# Patient Record
Sex: FEMALE | Race: WHITE | NOT HISPANIC OR LATINO | Employment: OTHER | URBAN - METROPOLITAN AREA
[De-identification: names, ages, dates, MRNs, and addresses within clinical notes are randomized per-mention and may not be internally consistent; named-entity substitution may affect disease eponyms.]

---

## 2017-05-17 ENCOUNTER — ONCOLOGY VISIT (OUTPATIENT)
Dept: ONCOLOGY | Facility: CLINIC | Age: 42
End: 2017-05-17
Attending: CLINICAL NURSE SPECIALIST
Payer: COMMERCIAL

## 2017-05-17 VITALS
WEIGHT: 194.2 LBS | HEIGHT: 69 IN | HEART RATE: 88 BPM | SYSTOLIC BLOOD PRESSURE: 123 MMHG | TEMPERATURE: 98.8 F | OXYGEN SATURATION: 96 % | DIASTOLIC BLOOD PRESSURE: 72 MMHG | RESPIRATION RATE: 16 BRPM | BODY MASS INDEX: 28.76 KG/M2

## 2017-05-17 DIAGNOSIS — Z80.3 FAMILY HISTORY OF MALIGNANT NEOPLASM OF BREAST: ICD-10-CM

## 2017-05-17 DIAGNOSIS — Z91.89 AT HIGH RISK FOR BREAST CANCER: Primary | ICD-10-CM

## 2017-05-17 DIAGNOSIS — F41.9 ANXIETY: ICD-10-CM

## 2017-05-17 PROCEDURE — 99213 OFFICE O/P EST LOW 20 MIN: CPT | Performed by: CLINICAL NURSE SPECIALIST

## 2017-05-17 PROCEDURE — 99211 OFF/OP EST MAY X REQ PHY/QHP: CPT

## 2017-05-17 RX ORDER — LORAZEPAM 1 MG/1
0.5-1 TABLET ORAL EVERY 8 HOURS PRN
Qty: 2 TABLET | Refills: 0 | Status: SHIPPED | OUTPATIENT
Start: 2017-05-17 | End: 2017-09-24

## 2017-05-17 RX ORDER — GABAPENTIN 100 MG/1
600 CAPSULE ORAL AT BEDTIME
COMMUNITY
Start: 2017-04-24 | End: 2017-11-15 | Stop reason: DRUGHIGH

## 2017-05-17 ASSESSMENT — PAIN SCALES - GENERAL: PAINLEVEL: NO PAIN (0)

## 2017-05-17 NOTE — PATIENT INSTRUCTIONS
Individualized Surveillance Plan for women  With 20% or greater lifetime risk of breast cancer   Per NCCN Guidelines Version 1.2016   Recommended screening Test or procedure Last done Next Scheduled    Clinical encounter Ongoing risk assessment, risk reduction counseling and clinical breast exam, every 6-12 months.   5/17/2017 Nov. 2017   However, some family histories with breast cancers at a very young age, may warrant screening starting earlier.    *May begin at age 40 if breast cancers in the family occur at later ages.    Annual mammogram beginning 10 years younger than the earliest breast cancer in the family but not prior to age 30.    Recommend annual breast MRI to begin 10 years younger than the earliest breast cancer in the family but not prior to age 25.    Breast MRIs are preferably done on day 7-15 of the menstrual cycle in premenopausal women.   5/31/2016 - Breast MRI, BiRads1      11/7/2016 - Screening tomosynthesis mammogram, BiRads1 NEXT: Breast MRI in June.    Next mammogram: Nov. After exam (no earlier than 11/8)   Breast screening for patients at high risk due to thoracic radiation between the ages of 10-30     Begin 8-10 years post radiation treatment or age 25.   Annual mammogram   and  Annual breast MRI, preferably on day 7-15 of menstrual cycle for premenopausal women.    Annual clinical breast exams with ongoing risk assessment and risk reduction counseling until age 25, then every 6-12 months.   NA   JAKOB     Maritza will call us when she knows her cycle for the MRI.  She is scheduled with Elena Aranda on 11/15/17 and then the mammo to follow that same day.  Maritza also asked for a prescription to take prior to the MRI.  I talked with Elena Aranda and she said that she will fax prescription to [facility] in [place]. Azeb FRANKLIN  AVS given to patient

## 2017-05-17 NOTE — LETTER
Cancer Risk Management  Program Locations    [facility]  [facility]  [facility]  [facility]  [facility]  Mailing Address  Cancer Risk Management Program  [facility]  [address]  [place] [address]    New patient appointments  [phone]  May 17, 2017    Maritza Hanson  [address] [address]      Dear Maritza,    It was a pleasure seeing you doing so well today.  Below is a copy of my note from our visit, outlining your surveillance plan.      I look forward to seeing you in the future to coordinate your care and reduce your health risks. Please feel free to contact me if you have any questions or concerns.      Oncology Risk Management Consultation:  Date on this visit: 2017    Maritza Hanson returns to the clinic today for a six month follow up. She requires high risk screening and surveillance for her risk of cancer secondary to having a family history of breast cancer in her mother at age 52,  maternal aunt in her 40s and paternal grandmother in her 40s.      She is considered to at high risk for breast cancer and has a 28% lifetime risk for breast cancer by the Evaristo model.     Primary Physician: Ollie Loera PA-C      History Of Present Illness:  Ms. Hanson is a very pleasant , healthy 41 year old female who presents with family history of breast cancer.   Genetic Testin2015 - Neeraj panel through [facility], negative for genetic mutations in 25 genes including: APC, MATTHEW, BARD1, BMPR1A, BRCA1, BRCA2, BRIP1, CDH1, CDK4, CDKN2A, CHEK2, EPCAM (large rearrangement only), MLH1, MSH2, MSH6, MUTYH, NBN, PALB2, PMS2, PTEN, RAD51C, RAD51D, SMAD4, STK11, and TP53 genes. No mutations were found in any of the 25 genes analyzed. This test involved sequencing and deletion/duplication analysis of all  genes with the exception of EPCAM (deletions only). However, she has a 28% lifetime risk for breast cancer by the Evaristo model. (See below for other family history of cancer).  Pertinent history:  Menarche at age 14.  First child at age 34.   Premenopausal.  Ovaries and uterus intact.  LMP: unknown.    History of breastfeeding both of her sons, for 6 months and 10 months, respectively.   History of OCPs 10 years and used one dose of depo-provera; she was unable to tolerate the medication, as it heightened her anxiety.  No history of HRT     Breast imaging history:  Breast MRI and mammogram in  from [place]. No records available; she states they were normal.  2015 -Screening mammogram, BiRads 0 for asymmetry in R breast  2015 - Diagnostic mammogram, R breast - BiRads1, fibroglandular asymmetry in upper R breast  2016 - Breast MRI, BiRads1  2016 - Screening tomosynthesis mammogram, BiRads1.    Other cancer screenin2016 - Colonoscopy for family history of colon cancer in father at age 65, paternal cousin with colon cancer in 50s. And paternal uncle with 10+ colon polyps. Results: 7 hyperplastic plastic polyps removed (2 in ascending and 5 in sigmoid), and 1 tubular adenoma removed from descending colon.      At this visit, she denies fatigue, pain, asymmetry, lumps, masses, thickening, pain, nipple discharge and skin changes in her breasts.     Past Medical/Surgical History:  Past Medical History:   Diagnosis Date     Anemia during pregnancy and now     Back pain      Depression with anxiety since teenage     History of tics      Mild preeclampsia     during both pregnancy, resolved     POTS (postural orthostatic tachycardia syndrome)      Past Surgical History:   Procedure Laterality Date     ENT SURGERY      wisdom teeth extraction     ORTHOPEDIC SURGERY      removal i=os sesimoid bone right foot       Allergies:  Allergies as of 2017 - Pedro as Reviewed 2017    Allergen Reaction Noted     Hydrocodone Itching 2012     Sulfa drugs Hives 2012       Current Medications:  Current Outpatient Prescriptions   Medication Sig Dispense Refill     Cetirizine HCl (ZYRTEC ALLERGY PO) Take 10 mg by mouth 2 times daily       MONTELUKAST SODIUM PO Take 10 mg by mouth At Bedtime       beclomethasone (QVAR) 80 MCG/ACT Inhaler Inhale 2 puffs into the lungs 2 times daily       gabapentin (NEURONTIN) 100 MG capsule 400 mg          Family History:  Family History   Problem Relation Age of Onset     Breast Cancer Mother 52      at 52     Thyroid Disease Mother      Psychotic Disorder Mother      Obesity Mother      Lung Cancer Maternal Grandmother       at 70     CEREBROVASCULAR DISEASE Maternal Grandfather      DIABETES Maternal Grandfather      Breast Cancer Paternal Grandmother 40     recurrence in 70s/recurrence in 70s,  at 70     DIABETES Father      Obesity Father      Psychotic Disorder Father      Hearing Loss Father      Colon Cancer Father 65     Breast Cancer Maternal Aunt 40      in 70s     Psychotic Disorder Sister      Colon Polyps Paternal Uncle      more than 10 colon polyps     Colon Cancer Cousin 50     maternal cousin     CANCER Paternal Aunt      unknown,  in 70s after reaction to chemo     CANCER Maternal Aunt 51     brain or bone cancer       Social History:  Social History     Social History     Marital status:      Spouse name: Sukumar     Number of children: 2     Years of education: N/A     Occupational History     Not on file.     Social History Main Topics     Smoking status: Never Smoker     Smokeless tobacco: Never Used     Alcohol use 0.0 oz/week     0 Standard drinks or equivalent per week      Comment: very rarely     Drug use: No     Sexual activity: Yes     Partners: Male     Birth control/ protection: Condom     Other Topics Concern     Parent/Sibling W/ Cabg, Mi Or Angioplasty Before 65f 55m? No      Service No  "    Blood Transfusions No     Caffeine Concern No     Occupational Exposure No     Hobby Hazards No     Sleep Concern No     Stress Concern Yes     Weight Concern Yes     would like to lose weight     Special Diet Yes     elimination dairy     Back Care Yes     Exercise No     Bike Helmet Not Asked     NA, don't ride     Seat Belt Yes     Self-Exams No     Social History Narrative       Physical Exam:  /72  Pulse 88  Temp 98.8  F (37.1  C)  Resp 16  Ht 1.753 m (5' 9.02\")  Wt 88.1 kg (194 lb 3.2 oz)  SpO2 96%  BMI 28.66 kg/m2    GENERAL APPEARANCE: healthy, alert and no distress     LYMPHATICS: No cervical, supraclavicular, axillary or inguinal lymphadenopathy     RESP: lungs clear to auscultation - no rales, rhonchi or wheezes  CARDIOVASCULAR: regular rate and rhythm, normal S1 S2, no S3 or S4 and no murmur.   BREAST: A multi positional, bilateral breast exam was performed.  Fairly symmetrical -Rsl>L. Right breast: no palpable dominant masses, no nipple discharge, no skin changes. Dense tissue.  Right axilla: no palpable adenopathy. Left breast: no palpable dominant masses, no nipple discharge, no skin changes. Left axilla: no palpable adenopathy. Dense tissue.        SKIN: no suspicious lesions or rashes      Family History   Problem Relation Age of Onset     Breast Cancer Mother 52      at 52     Thyroid Disease Mother      Psychotic Disorder Mother      Obesity Mother      Lung Cancer Maternal Grandmother       at 70     CEREBROVASCULAR DISEASE Maternal Grandfather      DIABETES Maternal Grandfather      Breast Cancer Paternal Grandmother 40     recurrence in 70s/recurrence in 70s,  at 70     DIABETES Father      Obesity Father      Psychotic Disorder Father      Hearing Loss Father      Colon Cancer Father 65     Breast Cancer Maternal Aunt 40      in 70s     Psychotic Disorder Sister      Colon Polyps Paternal Uncle      more than 10 colon polyps     Colon Cancer Cousin 50     " maternal cousin     CANCER Paternal Aunt      unknown,  in 70s after reaction to chemo     CANCER Maternal Aunt 51     brain or bone cancer       Laboratory/Imaging Studies  Results for orders placed or performed during the hospital encounter of 16   MA Screen Bilateral w/Baron    Narrative    SCREENING MAMMOGRAM, BILATERAL, DIGITAL w/CAD AND TOMOSYNTHESIS -  2016 11:08 AM    BREAST SYMPTOMS: No current breast complaints.     COMPARISON:  2015, 2011.    BREAST DENSITY: Scattered fibroglandular densities.    COMMENTS: No findings of suspicion for malignancy.       Impression    IMPRESSION: BI-RADS CATEGORY: 1 -  Negative    RECOMMENDED FOLLOW-UP: Annual Mammography.    Exam results letter mailed to patient.      ACOSTA PANTOJA MD       ASSESSMENT  We discussed her last screening tests and reviewed results.  She does not have any new breast complaints at this time.  She is doing well with her health promotion practices, runs using the [employer] program 3-4 days/week and has lost 8 pounds since November.  Her BMI is 28.64, fully clothed.  She continues to not smoke and limits alcohol.  Her next screening will be for a breast MRI in ; I am ordering ativan for her to help her with her anxiety during screening, per her request.    INDIVIDUALIZED CANCER RISK MANAGEMENT PLAN:  Individualized Surveillance Plan for women  With 20% or greater lifetime risk of breast cancer   Per NCCN Guidelines Version 1.2016   Recommended screening Test or procedure Last done Next Scheduled    Clinical encounter Ongoing risk assessment, risk reduction counseling and clinical breast exam, every 6-12 months.   2017   However, some family histories with breast cancers at a very young age, may warrant screening starting earlier.    *May begin at age 40 if breast cancers in the family occur at later ages.    Annual mammogram beginning 10 years younger than the earliest breast cancer in the family but  not prior to age 30.    Recommend annual breast MRI to begin 10 years younger than the earliest breast cancer in the family but not prior to age 25.    Breast MRIs are preferably done on day 7-15 of the menstrual cycle in premenopausal women.   5/31/2016 - Breast MRI, BiRads1      11/7/2016 - Screening tomosynthesis mammogram, BiRads1 NEXT: Breast MRI in June.    Next mammogram: Nov. After exam (no earlier than 11/8)   Breast screening for patients at high risk due to thoracic radiation between the ages of 10-30     Begin 8-10 years post radiation treatment or age 25.       I will follow up on her screenings and see her in the Cancer Risk Management clinic in November.    I spent 22 minutes with the patient with greater that 50% of it in counseling and coordinating care as documented above.    JENNIFER Guerrero-CNS, OCN, ANG-BC  Clinical Nurse Specialist  Cancer Risk Management Program  [address]  [place] [address]    phone:  [phone]  Pager: [phone]  fax: [phone]    Cc: Ollie Loera PA-C

## 2017-05-17 NOTE — MR AVS SNAPSHOT
After Visit Summary   5/17/2017    Maritza Hanson    MRN: [id number]           Patient Information     Date Of Birth          1975        Visit Information        Provider Department      5/17/2017 9:30 AM Elena Aranda APRN [facility] Cancer Risk Management Program        Today's Diagnoses     At high risk for breast cancer    -  1    Family history of malignant neoplasm of breast        Anxiety          Care Instructions    Individualized Surveillance Plan for women  With 20% or greater lifetime risk of breast cancer   Per NCCN Guidelines Version 1.2016   Recommended screening Test or procedure Last done Next Scheduled    Clinical encounter Ongoing risk assessment, risk reduction counseling and clinical breast exam, every 6-12 months.   5/17/2017 Nov. 2017   However, some family histories with breast cancers at a very young age, may warrant screening starting earlier.    *May begin at age 40 if breast cancers in the family occur at later ages.    Annual mammogram beginning 10 years younger than the earliest breast cancer in the family but not prior to age 30.    Recommend annual breast MRI to begin 10 years younger than the earliest breast cancer in the family but not prior to age 25.    Breast MRIs are preferably done on day 7-15 of the menstrual cycle in premenopausal women.   5/31/2016 - Breast MRI, BiRads1      11/7/2016 - Screening tomosynthesis mammogram, BiRads1 NEXT: Breast MRI in June.    Next mammogram: Nov. After exam (no earlier than 11/8)   Breast screening for patients at high risk due to thoracic radiation between the ages of 10-30     Begin 8-10 years post radiation treatment or age 25.   Annual mammogram   and  Annual breast MRI, preferably on day 7-15 of menstrual cycle for premenopausal women.    Annual clinical breast exams with ongoing risk assessment and risk reduction counseling until age 25, then every 6-12 months.   JAKOB Salas will call us when she  knows her cycle for the MRI.  She is scheduled with Elena Aranda on 11/15/17 and then the mammo to follow that same day.  Maritza also asked for a prescription to take prior to the MRI.  I talked with Elena Manoj and she said that she will fax prescription to [facility] in [place]. Azeb NORTONS given to patient          Follow-ups after your visit        Follow-up notes from your care team     Return in about 6 months (around 11/17/2017) for Physical Exam.      Your next 10 appointments already scheduled     Nov 15, 2017 10:00 AM CST   Return Visit with JENNIFER Guerrero CNS   Cancer Risk Management Program ([facility])    [facility]  [address]  [facility] [id number] [phone]            Nov 15, 2017 11:20 AM CST   MA SCREENING BILATERAL W/ BARON with RHBCMA1   [facility] ([facility])    [address] [facility]  [facility] [id number] [phone]           Do not use any powder, lotion or deodorant under your arms or on your breast. If you do, we will ask you to remove it before your exam.  Wear comfortable, two-piece clothing.  If you have any allergies, tell your care team.  Bring any previous mammograms from other facilities or have them mailed to the breast center.              Future tests that were ordered for you today     Open Future Orders        Priority Expected Expires Ordered    MA Screen Bilateral w/Baron Routine 11/8/2017 5/18/2018 5/17/2017            Who to contact     If you have questions or need follow up information about today's clinic visit or your schedule please contact CANCER RISK MANAGEMENT PROGRAM directly at [phone].  Normal or non-critical lab and imaging results will be communicated to you by MyChart, letter or phone within 4 business days after the clinic has received the results. If you do not hear from us within 7 days, please contact the clinic through  "MyChart or phone. If you have a critical or abnormal lab result, we will notify you by phone as soon as possible.  Submit refill requests through [facility] or call your pharmacy and they will forward the refill request to us. Please allow 3 business days for your refill to be completed.          Additional Information About Your Visit        [employer] Information     [facility] gives you secure access to your electronic health record. If you see a primary care provider, you can also send messages to your care team and make appointments. If you have questions, please call your primary care clinic.  If you do not have a primary care provider, please call [phone] and they will assist you.        Care EveryWhere ID     This is your Care EveryWhere ID. This could be used by other organizations to access your [place] medical records  [id number]        Your Vitals Were     Pulse Temperature Respirations Height Pulse Oximetry BMI (Body Mass Index)    88 98.8  F (37.1  C) 16 1.753 m (5' 9.02\") 96% 28.66 kg/m2       Blood Pressure from Last 3 Encounters:   05/17/17 123/72   11/16/16 113/72   05/18/16 105/69    Weight from Last 3 Encounters:   05/17/17 88.1 kg (194 lb 3.2 oz)   11/16/16 90 kg (198 lb 6.4 oz)   05/18/16 87.2 kg (192 lb 3.2 oz)                 Today's Medication Changes          These changes are accurate as of: 5/17/17 10:25 AM.  If you have any questions, ask your nurse or doctor.               Start taking these medicines.        Dose/Directions    LORazepam 1 MG tablet   Commonly known as:  ATIVAN   Used for:  Anxiety   Started by:  Elena Aranda APRN CNS        Dose:  0.5-1 mg   Take 0.5-1 tablets (0.5-1 mg) by mouth every 8 hours as needed for anxiety or other (prior to breast MRI) Take 30 minutes prior to departure.  Do not operate a vehicle after taking this medication   Quantity:  2 tablet   Refills:  0            Where to get your medicines      Some of these will need a paper prescription " and others can be bought over the counter.  Ask your nurse if you have questions.     Bring a paper prescription for each of these medications     LORazepam 1 MG tablet                Primary Care Provider Office Phone # Fax #    Ollie Loera PA-C [phone]       [facility] [address] [address] [facility] [address]        Thank you!     Thank you for choosing CANCER RISK MANAGEMENT PROGRAM  for your care. Our goal is always to provide you with excellent care. Hearing back from our patients is one way we can continue to improve our services. Please take a few minutes to complete the written survey that you may receive in the mail after your visit with us. Thank you!             Your Updated Medication List - Protect others around you: Learn how to safely use, store and throw away your medicines at www.disposemymeds.org.          This list is accurate as of: 5/17/17 10:25 AM.  Always use your most recent med list.                   Brand Name Dispense Instructions for use    gabapentin 100 MG capsule    NEURONTIN     400 mg       LORazepam 1 MG tablet    ATIVAN    2 tablet    Take 0.5-1 tablets (0.5-1 mg) by mouth every 8 hours as needed for anxiety or other (prior to breast MRI) Take 30 minutes prior to departure.  Do not operate a vehicle after taking this medication       MONTELUKAST SODIUM PO      Take 10 mg by mouth At Bedtime       QVAR 80 MCG/ACT Inhaler   Generic drug:  beclomethasone      Inhale 2 puffs into the lungs 2 times daily       ZYRTEC ALLERGY PO      Take 10 mg by mouth 2 times daily

## 2017-05-17 NOTE — NURSING NOTE
"Oncology Rooming Note    May 17, 2017 9:33 AM   Maritza Hanson is a 41 year old female who presents for:    Chief Complaint   Patient presents with     Family History Of Cancer     Initial Vitals: /72  Pulse 88  Temp 98.8  F (37.1  C)  Resp 16  Ht 1.753 m (5' 9.02\")  Wt 88.1 kg (194 lb 3.2 oz)  SpO2 96%  BMI 28.66 kg/m2 Estimated body mass index is 28.66 kg/(m^2) as calculated from the following:    Height as of this encounter: 1.753 m (5' 9.02\").    Weight as of this encounter: 88.1 kg (194 lb 3.2 oz). Body surface area is 2.07 meters squared.  No Pain (0) Comment: Data Unavailable   No LMP recorded.  Allergies reviewed: Yes  Medications reviewed: Yes    Medications: Medication refills not needed today.  Pharmacy name entered into [employer]: [facility] [address] [place] [address]  [address]    Clinical concerns: Follow-up     8 minutes for nursing intake (face to face time)     Blanca Ulrich    DISCHARGE PLAN:  Next appointments: See patient instruction section  Departure Mode: Ambulatory  Accompanied by: self  0 minutes for nursing discharge (face to face time)   Blanca Ulrich              "

## 2017-05-17 NOTE — PROGRESS NOTES
Oncology Risk Management Consultation:  Date on this visit: 2017    Maritza Hanson returns to the clinic today for a six month follow up. She requires high risk screening and surveillance for her risk of cancer secondary to having a family history of breast cancer in her mother at age 52,  maternal aunt in her 40s and paternal grandmother in her 40s.      She is considered to at high risk for breast cancer and has a 28% lifetime risk for breast cancer by the Evaristo model.     Primary Physician: Ollie Loera PA-C      History Of Present Illness:  Ms. Hanson is a very pleasant , healthy 41 year old female who presents with family history of breast cancer.   Genetic Testin2015 - [employer] panel through [employer], negative for genetic mutations in 25 genes including: APC, MATTHEW, BARD1, BMPR1A, BRCA1, BRCA2, BRIP1, CDH1, CDK4, CDKN2A, CHEK2, EPCAM (large rearrangement only), MLH1, MSH2, MSH6, MUTYH, NBN, PALB2, PMS2, PTEN, RAD51C, RAD51D, SMAD4, STK11, and TP53 genes. No mutations were found in any of the 25 genes analyzed. This test involved sequencing and deletion/duplication analysis of all genes with the exception of EPCAM (deletions only). However, she has a 28% lifetime risk for breast cancer by the Evaristo model. (See below for other family history of cancer).  Pertinent history:  Menarche at age 14.  First child at age 34.   Premenopausal.  Ovaries and uterus intact.  LMP: unknown.    History of breastfeeding both of her sons, for 6 months and 10 months, respectively.   History of OCPs 10 years and used one dose of depo-provera; she was unable to tolerate the medication, as it heightened her anxiety.  No history of HRT     Breast imaging history:  Breast MRI and mammogram in  from [place]. No records available; she states they were normal.  2015 -Screening mammogram, BiRads 0 for asymmetry in R breast  2015 - Diagnostic mammogram, R breast - BiRads1, fibroglandular asymmetry  in upper R breast  2016 - Breast MRI, BiRads1  2016 - Screening tomosynthesis mammogram, BiRads1.    Other cancer screenin2016 - Colonoscopy for family history of colon cancer in father at age 65, paternal cousin with colon cancer in 50s. And paternal uncle with 10+ colon polyps. Results: 7 hyperplastic plastic polyps removed (2 in ascending and 5 in sigmoid), and 1 tubular adenoma removed from descending colon.      At this visit, she denies fatigue, pain, asymmetry, lumps, masses, thickening, pain, nipple discharge and skin changes in her breasts.     Past Medical/Surgical History:  Past Medical History:   Diagnosis Date     Anemia during pregnancy and now     Back pain      Depression with anxiety since teenage     History of tics      Mild preeclampsia     during both pregnancy, resolved     POTS (postural orthostatic tachycardia syndrome)      Past Surgical History:   Procedure Laterality Date     ENT SURGERY      wisdom teeth extraction     ORTHOPEDIC SURGERY      removal i=os sesimoid bone right foot       Allergies:  Allergies as of 2017 - Pedro as Reviewed 2017   Allergen Reaction Noted     Hydrocodone Itching 2012     Sulfa drugs Hives 2012       Current Medications:  Current Outpatient Prescriptions   Medication Sig Dispense Refill     Cetirizine HCl (ZYRTEC ALLERGY PO) Take 10 mg by mouth 2 times daily       MONTELUKAST SODIUM PO Take 10 mg by mouth At Bedtime       beclomethasone (QVAR) 80 MCG/ACT Inhaler Inhale 2 puffs into the lungs 2 times daily       gabapentin (NEURONTIN) 100 MG capsule 400 mg          Family History:  Family History   Problem Relation Age of Onset     Breast Cancer Mother 52      at 52     Thyroid Disease Mother      Psychotic Disorder Mother      Obesity Mother      Lung Cancer Maternal Grandmother       at 70     CEREBROVASCULAR DISEASE Maternal Grandfather      DIABETES Maternal Grandfather      Breast Cancer Paternal  "Grandmother 40     recurrence in 70s/recurrence in 70s,  at 70     DIABETES Father      Obesity Father      Psychotic Disorder Father      Hearing Loss Father      Colon Cancer Father 65     Breast Cancer Maternal Aunt 40      in 70s     Psychotic Disorder Sister      Colon Polyps Paternal Uncle      more than 10 colon polyps     Colon Cancer Cousin 50     maternal cousin     CANCER Paternal Aunt      unknown,  in 70s after reaction to chemo     CANCER Maternal Aunt 51     brain or bone cancer       Social History:  Social History     Social History     Marital status:      Spouse name: Sukumar     Number of children: 2     Years of education: N/A     Occupational History     Not on file.     Social History Main Topics     Smoking status: Never Smoker     Smokeless tobacco: Never Used     Alcohol use 0.0 oz/week     0 Standard drinks or equivalent per week      Comment: very rarely     Drug use: No     Sexual activity: Yes     Partners: Male     Birth control/ protection: Condom     Other Topics Concern     Parent/Sibling W/ Cabg, Mi Or Angioplasty Before 65f 55m? No      Service No     Blood Transfusions No     Caffeine Concern No     Occupational Exposure No     Hobby Hazards No     Sleep Concern No     Stress Concern Yes     Weight Concern Yes     would like to lose weight     Special Diet Yes     elimination dairy     Back Care Yes     Exercise No     Bike Helmet Not Asked     NA, don't ride     Seat Belt Yes     Self-Exams No     Social History Narrative       Physical Exam:  /72  Pulse 88  Temp 98.8  F (37.1  C)  Resp 16  Ht 1.753 m (5' 9.02\")  Wt 88.1 kg (194 lb 3.2 oz)  SpO2 96%  BMI 28.66 kg/m2    GENERAL APPEARANCE: healthy, alert and no distress     LYMPHATICS: No cervical, supraclavicular, axillary or inguinal lymphadenopathy     RESP: lungs clear to auscultation - no rales, rhonchi or wheezes  CARDIOVASCULAR: regular rate and rhythm, normal S1 S2, no S3 or S4 and no " murmur.   BREAST: A multi positional, bilateral breast exam was performed.  Fairly symmetrical -Rsl>L. Right breast: no palpable dominant masses, no nipple discharge, no skin changes. Dense tissue.  Right axilla: no palpable adenopathy. Left breast: no palpable dominant masses, no nipple discharge, no skin changes. Left axilla: no palpable adenopathy. Dense tissue.        SKIN: no suspicious lesions or rashes      Family History   Problem Relation Age of Onset     Breast Cancer Mother 52      at 52     Thyroid Disease Mother      Psychotic Disorder Mother      Obesity Mother      Lung Cancer Maternal Grandmother       at 70     CEREBROVASCULAR DISEASE Maternal Grandfather      DIABETES Maternal Grandfather      Breast Cancer Paternal Grandmother 40     recurrence in 70s/recurrence in 70s,  at 70     DIABETES Father      Obesity Father      Psychotic Disorder Father      Hearing Loss Father      Colon Cancer Father 65     Breast Cancer Maternal Aunt 40      in 70s     Psychotic Disorder Sister      Colon Polyps Paternal Uncle      more than 10 colon polyps     Colon Cancer Cousin 50     maternal cousin     CANCER Paternal Aunt      unknown,  in 70s after reaction to chemo     CANCER Maternal Aunt 51     brain or bone cancer       Laboratory/Imaging Studies  Results for orders placed or performed during the hospital encounter of 16   MA Screen Bilateral w/Baron    Narrative    SCREENING MAMMOGRAM, BILATERAL, DIGITAL w/CAD AND TOMOSYNTHESIS -  2016 11:08 AM    BREAST SYMPTOMS: No current breast complaints.     COMPARISON:  2015, 2011.    BREAST DENSITY: Scattered fibroglandular densities.    COMMENTS: No findings of suspicion for malignancy.       Impression    IMPRESSION: BI-RADS CATEGORY: 1 -  Negative    RECOMMENDED FOLLOW-UP: Annual Mammography.    Exam results letter mailed to patient.      ACOSTA PANTOJA MD       ASSESSMENT  We discussed her last screening tests and reviewed  results.  She does not have any new breast complaints at this time.  She is doing well with her health promotion practices, runs using the [employer] program 3-4 days/week and has lost 8 pounds since November.  Her BMI is 28.64, fully clothed.  She continues to not smoke and limits alcohol.  Her next screening will be for a breast MRI in June; I am ordering ativan for her to help her with her anxiety during screening, per her request.    INDIVIDUALIZED CANCER RISK MANAGEMENT PLAN:  Individualized Surveillance Plan for women  With 20% or greater lifetime risk of breast cancer   Per NCCN Guidelines Version 1.2016   Recommended screening Test or procedure Last done Next Scheduled    Clinical encounter Ongoing risk assessment, risk reduction counseling and clinical breast exam, every 6-12 months.   5/17/2017 Nov. 2017   However, some family histories with breast cancers at a very young age, may warrant screening starting earlier.    *May begin at age 40 if breast cancers in the family occur at later ages.    Annual mammogram beginning 10 years younger than the earliest breast cancer in the family but not prior to age 30.    Recommend annual breast MRI to begin 10 years younger than the earliest breast cancer in the family but not prior to age 25.    Breast MRIs are preferably done on day 7-15 of the menstrual cycle in premenopausal women.   5/31/2016 - Breast MRI, BiRads1      11/7/2016 - Screening tomosynthesis mammogram, BiRads1 NEXT: Breast MRI in June.    Next mammogram: Nov. After exam (no earlier than 11/8)   Breast screening for patients at high risk due to thoracic radiation between the ages of 10-30     Begin 8-10 years post radiation treatment or age 25.       I will follow up on her screenings and see her in the Cancer Risk Management clinic in November.    I spent 22 minutes with the patient with greater that 50% of it in counseling and coordinating care as documented above.    Elena Aranda,  APRN-CNS, OCN, ANG-BC  Clinical Nurse Specialist  Cancer Risk Management Program  [address]  [place] [address]    phone:  [phone]  Pager: [phone]  fax: [phone]    Cc: Ollie Loera PA-C

## 2017-08-15 ENCOUNTER — TRANSFERRED RECORDS (OUTPATIENT)
Dept: HEALTH INFORMATION MANAGEMENT | Facility: CLINIC | Age: 42
End: 2017-08-15

## 2017-09-24 ENCOUNTER — APPOINTMENT (OUTPATIENT)
Dept: GENERAL RADIOLOGY | Facility: CLINIC | Age: 42
End: 2017-09-24
Attending: EMERGENCY MEDICINE
Payer: COMMERCIAL

## 2017-09-24 ENCOUNTER — HOSPITAL ENCOUNTER (EMERGENCY)
Facility: CLINIC | Age: 42
Discharge: HOME | End: 2017-09-24
Attending: EMERGENCY MEDICINE | Admitting: EMERGENCY MEDICINE
Payer: COMMERCIAL

## 2017-09-24 VITALS
TEMPERATURE: 97.5 F | RESPIRATION RATE: 18 BRPM | HEART RATE: 87 BPM | OXYGEN SATURATION: 99 % | DIASTOLIC BLOOD PRESSURE: 75 MMHG | SYSTOLIC BLOOD PRESSURE: 128 MMHG

## 2017-09-24 DIAGNOSIS — J45.901 ASTHMA EXACERBATION: ICD-10-CM

## 2017-09-24 LAB
ANION GAP SERPL CALCULATED.3IONS-SCNC: 9 MMOL/L (ref 3–14)
BUN SERPL-MCNC: 17 MG/DL (ref 7–30)
CALCIUM SERPL-MCNC: 8.7 MG/DL (ref 8.5–10.1)
CHLORIDE SERPL-SCNC: 104 MMOL/L (ref 94–109)
CO2 SERPL-SCNC: 26 MMOL/L (ref 20–32)
CREAT SERPL-MCNC: 0.66 MG/DL (ref 0.52–1.04)
ERYTHROCYTE [DISTWIDTH] IN BLOOD BY AUTOMATED COUNT: 12.1 % (ref 10–15)
GFR SERPL CREATININE-BSD FRML MDRD: >90 ML/MIN/1.7M2
GLUCOSE SERPL-MCNC: 96 MG/DL (ref 70–99)
HCG SERPL QL: NEGATIVE
HCT VFR BLD AUTO: 36.5 % (ref 35–47)
HGB BLD-MCNC: 12.7 G/DL (ref 11.7–15.7)
MCH RBC QN AUTO: 31.4 PG (ref 26.5–33)
MCHC RBC AUTO-ENTMCNC: 34.8 G/DL (ref 31.5–36.5)
MCV RBC AUTO: 90 FL (ref 78–100)
PLATELET # BLD AUTO: 209 10E9/L (ref 150–450)
POTASSIUM SERPL-SCNC: 3 MMOL/L (ref 3.4–5.3)
RBC # BLD AUTO: 4.04 10E12/L (ref 3.8–5.2)
SODIUM SERPL-SCNC: 139 MMOL/L (ref 133–144)
TROPONIN I SERPL-MCNC: <0.015 UG/L (ref 0–0.04)
WBC # BLD AUTO: 8 10E9/L (ref 4–11)

## 2017-09-24 PROCEDURE — 96374 THER/PROPH/DIAG INJ IV PUSH: CPT

## 2017-09-24 PROCEDURE — 80048 BASIC METABOLIC PNL TOTAL CA: CPT | Performed by: EMERGENCY MEDICINE

## 2017-09-24 PROCEDURE — 84484 ASSAY OF TROPONIN QUANT: CPT | Performed by: EMERGENCY MEDICINE

## 2017-09-24 PROCEDURE — 94640 AIRWAY INHALATION TREATMENT: CPT

## 2017-09-24 PROCEDURE — 40000275 ZZH STATISTIC RCP TIME EA 10 MIN

## 2017-09-24 PROCEDURE — 96361 HYDRATE IV INFUSION ADD-ON: CPT

## 2017-09-24 PROCEDURE — 25000128 H RX IP 250 OP 636: Performed by: EMERGENCY MEDICINE

## 2017-09-24 PROCEDURE — 85027 COMPLETE CBC AUTOMATED: CPT | Performed by: EMERGENCY MEDICINE

## 2017-09-24 PROCEDURE — 71020 XR CHEST 2 VW: CPT

## 2017-09-24 PROCEDURE — 84703 CHORIONIC GONADOTROPIN ASSAY: CPT | Performed by: EMERGENCY MEDICINE

## 2017-09-24 PROCEDURE — 93005 ELECTROCARDIOGRAM TRACING: CPT

## 2017-09-24 PROCEDURE — 25000125 ZZHC RX 250: Performed by: EMERGENCY MEDICINE

## 2017-09-24 PROCEDURE — 99284 EMERGENCY DEPT VISIT MOD MDM: CPT | Mod: 25

## 2017-09-24 RX ORDER — DEXAMETHASONE SODIUM PHOSPHATE 10 MG/ML
10 INJECTION, SOLUTION INTRAMUSCULAR; INTRAVENOUS ONCE
Status: COMPLETED | OUTPATIENT
Start: 2017-09-24 | End: 2017-09-24

## 2017-09-24 RX ORDER — IPRATROPIUM BROMIDE AND ALBUTEROL SULFATE 2.5; .5 MG/3ML; MG/3ML
3 SOLUTION RESPIRATORY (INHALATION) ONCE
Status: COMPLETED | OUTPATIENT
Start: 2017-09-24 | End: 2017-09-24

## 2017-09-24 RX ORDER — DEXAMETHASONE 4 MG/1
12 TABLET ORAL ONCE
Qty: 3 TABLET | Refills: 0 | Status: SHIPPED | OUTPATIENT
Start: 2017-09-25 | End: 2017-11-21

## 2017-09-24 RX ADMIN — IPRATROPIUM BROMIDE AND ALBUTEROL SULFATE 3 ML: .5; 3 SOLUTION RESPIRATORY (INHALATION) at 14:27

## 2017-09-24 RX ADMIN — SODIUM CHLORIDE 1000 ML: 9 INJECTION, SOLUTION INTRAVENOUS at 14:47

## 2017-09-24 RX ADMIN — DEXAMETHASONE SODIUM PHOSPHATE 10 MG: 10 INJECTION, SOLUTION INTRAMUSCULAR; INTRAVENOUS at 16:08

## 2017-09-24 RX ADMIN — IPRATROPIUM BROMIDE AND ALBUTEROL SULFATE 3 ML: .5; 3 SOLUTION RESPIRATORY (INHALATION) at 16:08

## 2017-09-24 ASSESSMENT — ENCOUNTER SYMPTOMS
NAUSEA: 0
VOMITING: 0
FEVER: 0
SHORTNESS OF BREATH: 1

## 2017-09-24 NOTE — ED PROVIDER NOTES
"  History     Chief Complaint:  Shortness of Breath       HPI   Maritza Hanson is a 42 year old female who presents with primary complaints of shortness of breath. Patient states 12 days ago she began to develop a \"cold\" which he describes as nasal congestion, rhinorrhea and a nonproductive cough. 6 days prior to arrival she was seen by her primary care physician and was placed on his azithromycin and inhaled steroid. She opted against oral steroids secondary to adverse drug effects from insomnia related to prior prednisone use. She had no improvement of the next 24 hours and was initiated on prednisone the following day. She was written for 50 mg once daily but had left over 20 mg tablets so she has only been taking 20 mg daily. She presents today with increased difficulty breathing this morning. She used her albuterol inhaler without improvement. She states that movement or exertion will make the symptoms worse. This is accompanied by a chest heaviness that has been present over the last 12 days. There have been no pain-free episodes. The pain is constant, non-radiating and at times occurs during episodes of worsening asthma. She denies any history of DVT, PE, unilateral like swine, home offices, estrogen use or malignancy..    Allergies:  Hydrocodone  Sulfa Drugs     Medications:      SERTRALINE HCL PO   PREDNISONE PO   Cetirizine HCl (ZYRTEC ALLERGY PO)   MONTELUKAST SODIUM PO   beclomethasone (QVAR) 80 MCG/ACT Inhaler   gabapentin (NEURONTIN) 100 MG capsule     Past Medical History:    Past Medical History:   Diagnosis Date     Anemia during pregnancy and now     Back pain      Depression with anxiety since teenage     History of tics      Mild preeclampsia      POTS (postural orthostatic tachycardia syndrome)        Past Surgical History:    Past Surgical History:   Procedure Laterality Date     ENT SURGERY  1998    wisdom teeth extraction     ORTHOPEDIC SURGERY  1994    removal i=os sesimoid bone right " foot        Family History:    family history includes Breast Cancer (age of onset: 40) in her maternal aunt and paternal grandmother; Breast Cancer (age of onset: 52) in her mother; CANCER in her paternal aunt; CANCER (age of onset: 51) in her maternal aunt; CEREBROVASCULAR DISEASE in her maternal grandfather; Colon Cancer (age of onset: 50) in her cousin; Colon Cancer (age of onset: 65) in her father; Colon Polyps in her paternal uncle; DIABETES in her father and maternal grandfather; Hearing Loss in her father; Lung Cancer in her maternal grandmother; Obesity in her father and mother; Psychotic Disorder in her father, mother, and sister; Thyroid Disease in her mother.    Social History:   reports that she has never smoked. She has never used smokeless tobacco. She reports that she drinks alcohol. She reports that she does not use illicit drugs.    PCP: Ollie Loera     Review of Systems   Constitutional: Negative for fever.   Respiratory: Positive for shortness of breath.    Cardiovascular: Positive for chest pain.   Gastrointestinal: Negative for nausea and vomiting.   Skin: Negative for rash.   All other systems reviewed and are negative.        Physical Exam   Patient Vitals for the past 24 hrs:   BP Temp Temp src Pulse Heart Rate Resp SpO2   09/24/17 1427 - - - - - - 98 %   09/24/17 1408 (!) 151/91 97.5  F (36.4  C) Oral 87 87 18 98 %        Physical Exam    General:   Pleasant, age appropriate female.      Resting comfortably in the bed.  HEENT:    Oropharynx is moist, without lesions or trismus.     TMs clear  Eyes:    Conjunctiva normal, PERRL  Neck:    Supple, no meningismus.     CV:     Regular rate and rhythm.      No murmurs, rubs or gallops.  .       2+ radial pulses bilateral.       No lower extremity edema.  PULM:    Trace late expiratory wheezing at left lower lobe.       No respiratory distress.      Good air exchange.     Speaks in full sentences.     No rales or rhonci.     No stridor.  ABD:     Soft, non-tender, non-distended.       No rebound, guarding or rigidity.  MSK:     No gross deformity to all four extremities.   LYMPH:   No cervical lymphadenopathy.  NEURO:   Alert; good muscle tone     No atrophy  Skin:    Warm, dry and intact.    Psych:    Mood is good and affect is appropriate.        Emergency Department Course     ECG (14:51:10):  Rate 82 bpm.   QT/QTc 394/460.   P-R-T axes 43.   Normal sinus rhythm  Non-specific T wave abnormality - unchanged since 10/29/13  Interpreted at 1458 by Gavino Gabriel MD.       Imaging:  Chest XR,  PA & LAT   Final Result   IMPRESSION: The lungs are clear. No focal pulmonary opacities. Heart   and mediastinum are unremarkable. No acute cardiopulmonary   abnormalities.      MANDO FRIEDMAN MD           Laboratory:  Results for orders placed or performed during the hospital encounter of 09/24/17 (from the past 24 hour(s))   CBC (platelets, no diff)   Result Value Ref Range    WBC 8.0 4.0 - 11.0 10e9/L    RBC Count 4.04 3.8 - 5.2 10e12/L    Hemoglobin 12.7 11.7 - 15.7 g/dL    Hematocrit 36.5 35.0 - 47.0 %    MCV 90 78 - 100 fl    MCH 31.4 26.5 - 33.0 pg    MCHC 34.8 31.5 - 36.5 g/dL    RDW 12.1 10.0 - 15.0 %    Platelet Count 209 150 - 450 10e9/L   Basic metabolic panel (BMP)   Result Value Ref Range    Sodium 139 133 - 144 mmol/L    Potassium 3.0 (L) 3.4 - 5.3 mmol/L    Chloride 104 94 - 109 mmol/L    Carbon Dioxide 26 20 - 32 mmol/L    Anion Gap 9 3 - 14 mmol/L    Glucose 96 70 - 99 mg/dL    Urea Nitrogen 17 7 - 30 mg/dL    Creatinine 0.66 0.52 - 1.04 mg/dL    GFR Estimate >90 >60 mL/min/1.7m2    GFR Estimate If Black >90 >60 mL/min/1.7m2    Calcium 8.7 8.5 - 10.1 mg/dL   Troponin I   Result Value Ref Range    Troponin I ES <0.015 0.000 - 0.045 ug/L   HCG QUALitative pregnancy (blood)   Result Value Ref Range    HCG Qualitative Serum Negative NEG^Negative   EKG 12 lead   Result Value Ref Range    Interpretation ECG Click View Image link to view waveform and  result    Chest XR,  PA & LAT    Narrative    XR CHEST 2 VW 2017 3:20 PM    HISTORY: Dyspnea.    COMPARISON: None.      Impression    IMPRESSION: The lungs are clear. No focal pulmonary opacities. Heart  and mediastinum are unremarkable. No acute cardiopulmonary  abnormalities.    MANDO FRIEDMAN MD         Interventions:  Duoneb x 1  Duoneb x 1  Decadron 10 mg IV    Emergency Department Course:  Past medical records, nursing notes, and vitals reviewed.  I performed an exam of the patient and obtained history, as documented above.    I rechecked the patient at 1543: Wheezing improved.   Findings and plan explained to the Patient and . Patient was discharged home.    Impression & Plan      Medical Decision Makin-year-old female with history of asthma presents to the ED with difficulty breathing and failed outpatient management with bronchodilators and 20 mg of daily prednisone. On examination she had late expiratory wheezing with signs of mild asthma exacerbation. Given a persistence of symptoms I did have the patient undergo further workup. EKG showed no ischemic changes. Troponin with normal limits as she did have vague chest heaviness which has been present for greater than 48 hours thus ruling out MI. She has no features concerning for PE.  She is PERC negative.  I believe her failure of outpatient management has been due to the low-dose prednisone 20 mg. She opted for Decadron today and tomorrow given her history of G.I. upset with prednisone. Patient follow-up primary care physician in 3 days and return to the ED for any worsening symptoms.    Diagnosis:    ICD-10-CM    1. Asthma exacerbation J45.901         Discharge Medications:  New Prescriptions    DEXAMETHASONE (DECADRON) 4 MG TABLET    Take 3 tablets (12 mg) by mouth once for 1 dose        2017   Gavino Gabriel MD Matthews, Jeremiah R, MD  17 1546

## 2017-09-24 NOTE — ED AVS SNAPSHOT
[facility] Department    [address]    [facility] [address]    Phone:  [phone]    Fax:  [phone]                                       Maritza Hanson   MRN: [id number]    Department:  [facility] Emergency Department   Date of Visit:  9/24/2017           After Visit Summary Signature Page     I have received my discharge instructions, and my questions have been answered. I have discussed any challenges I see with this plan with the nurse or doctor.    ..........................................................................................................................................  Patient/Patient Representative Signature      ..........................................................................................................................................  Patient Representative Print Name and Relationship to Patient    ..................................................               ................................................  Date                                            Time    ..........................................................................................................................................  Reviewed by Signature/Title    ...................................................              ..............................................  Date                                                            Time

## 2017-09-24 NOTE — ED NOTES
Congestion with hst of asthma. Pt has been using albuterol inhaler home, not opening her up as much the last 2 days. Nebs at home, then switched to prednisone on Wednesday.   Pt took a full Z-duane, last dose today.

## 2017-09-24 NOTE — ED AVS SNAPSHOT
[facility] Emergency Department    [address]    [facility] [address]    Phone:  [phone]    Fax:  [phone]                                       Maritza Hanson   MRN: [id number]    Department:  [facility] Emergency Department   Date of Visit:  9/24/2017           Patient Information     Date Of Birth          1975        Your diagnoses for this visit were:     Asthma exacerbation        You were seen by Gavino Gabriel MD.      Follow-up Information     Schedule an appointment as soon as possible for a visit with Ollie Loera PA-C.    Specialty:  Physician Assistant    Contact information:    [facility]  [address] [facility] [address] [phone]          Discharge Instructions       Discharge Instructions  Asthma    Asthma is a condition causing narrowing and inflammation of the airways that can make it hard to breathe.  Asthma can also cause cough, wheezing, noisy breathing and tightness in the chest.  Asthma can be brought on or  triggered  by many things, including dust, mold, pollen, cigarette smoke, exercise, stress and infections (like the common cold).     Generally, every Emergency Department visit should have a follow-up clinic visit with either a primary or a specialty clinic/provider. Please follow-up as instructed by your emergency provider today.    Return to the Emergency Department if:    Your breathing gets worse.    You need to use your inhaler more often than every 4 hours, or cannot get relief from your inhaler.    You are very weak, or feel much more ill.    You develop new symptoms, such as chest pain.    You cough up blood.    You are vomiting (throwing up) enough that you cannot keep fluids or your medicine down.    What can I do to help myself?    Fill any prescriptions the provider gave you and take them right away--especially antibiotics. Be sure to finish the whole antibiotic prescription.    You may be given  a prescription for an inhaler, which can help loosen tight air passages.  Use this as needed, but not more often than directed. Inhalers work much better when used with a spacer.     You may be given a prescription for a steroid to reduce inflammation. Used long-term, these can have some side effects, but for short-term use they are safe. You may notice restlessness or increased appetite (eating more).        You may use non-prescription cough or cold medicines. Cough medicines may help, but do not make the cough go away completely.     Avoid smoke, because this can make you feel worse. If you smoke, this may be a good time to quit! Consider using nicotine lozenges, gum, or patches to reduce cravings.     If you have a fever, Tylenol  (acetaminophen), Motrin  (ibuprofen), or Advil  (ibuprofen), may help bring fever down and may help you feel more comfortable. Be sure to read and follow the package directions, and ask your provider if you have questions.    Be sure to get your flu shot each year.  For certain age groups, the pneumonia shot can help prevent pneumonia.  It is important that you follow up with your regular provider, to be sure that you are improving from this spell (an acute asthma exacerbation), and also to do what you can to keep from having trouble again. Sometimes you need long-term medicines to keep your asthma under control.   If you were given a prescription for medicine here today, be sure to read all of the information (including the package insert) that comes with your prescription.  This will include important information about the medicine, its side effects, and any warnings that you need to know about.  The pharmacist who fills the prescription can provide more information and answer questions you may have about the medicine.  If you have questions or concerns that the pharmacist cannot address, please call or return to the Emergency Department.   Remember that you can always come back to  the Emergency Department if you are not able to see your regular provider in the amount of time listed above, if you get any new symptoms, or if there is anything that worries you.      Future Appointments        Provider Department Dept Phone Center    11/15/2017 10:00 AM [facility] [phone] [facility]    11/15/2017 11:20 AM [facility] MAMMO ROOM 1 [facility] [phone] [facility]      24 Hour Appointment Hotline       To make an appointment at any [place] clinic, call [phone] ([phone]). If you don't have a family doctor or clinic, we will help you find one. [place] clinics are conveniently located to serve the needs of you and your family.             Review of your medicines      START taking        Dose / Directions Last dose taken    dexamethasone 4 MG tablet   Commonly known as:  DECADRON   Dose:  12 mg   Quantity:  3 tablet   Start taking on:  9/25/2017        Take 3 tablets (12 mg) by mouth once for 1 dose   Refills:  0          Our records show that you are taking the medicines listed below. If these are incorrect, please call your family doctor or clinic.        Dose / Directions Last dose taken    gabapentin 100 MG capsule   Commonly known as:  NEURONTIN   Dose:  600 mg        Take 600 mg by mouth At Bedtime   Refills:  0        MONTELUKAST SODIUM PO   Dose:  10 mg        Take 10 mg by mouth At Bedtime   Refills:  0        PREDNISONE PO   Dose:  20 mg        Take 20 mg by mouth daily   Refills:  0        QVAR 80 MCG/ACT Inhaler   Dose:  2 puff   Generic drug:  beclomethasone        Inhale 2 puffs into the lungs 2 times daily   Refills:  0        SERTRALINE HCL PO   Dose:  100 mg        Take 100 mg by mouth daily   Refills:  0        ZYRTEC ALLERGY PO   Dose:  10 mg        Take 10 mg by mouth 2 times daily   Refills:  0                Prescriptions were sent or printed at these locations (1 Prescription)                    Other Prescriptions                Printed at Department/Unit printer (1 of 1)         dexamethasone (DECADRON) 4 MG tablet                Procedures and tests performed during your visit     Basic metabolic panel (BMP)    CBC (platelets, no diff)    Chest XR,  PA & LAT    EKG 12 lead    HCG QUALitative pregnancy (blood)    Troponin I      Orders Needing Specimen Collection     None      Pending Results     Date and Time Order Name Status Description    9/24/2017 1419 EKG 12 lead Preliminary             Pending Culture Results     No orders found from 9/22/2017 to 9/25/2017.            Pending Results Instructions     If you had any lab results that were not finalized at the time of your Discharge, you can call the ED Lab Result RN at [phone]. You will be contacted by this team for any positive Lab results or changes in treatment. The nurses are available 7 days a week from 10A to 6:30P.  You can leave a message 24 hours per day and they will return your call.        Test Results From Your Hospital Stay        9/24/2017  3:09 PM      Component Results     Component Value Ref Range & Units Status    WBC 8.0 4.0 - 11.0 10e9/L Final    RBC Count 4.04 3.8 - 5.2 10e12/L Final    Hemoglobin 12.7 11.7 - 15.7 g/dL Final    Hematocrit 36.5 35.0 - 47.0 % Final    MCV 90 78 - 100 fl Final    MCH 31.4 26.5 - 33.0 pg Final    MCHC 34.8 31.5 - 36.5 g/dL Final    RDW 12.1 10.0 - 15.0 % Final    Platelet Count 209 150 - 450 10e9/L Final         9/24/2017  3:34 PM      Component Results     Component Value Ref Range & Units Status    Sodium 139 133 - 144 mmol/L Final    Potassium 3.0 (L) 3.4 - 5.3 mmol/L Final    Chloride 104 94 - 109 mmol/L Final    Carbon Dioxide 26 20 - 32 mmol/L Final    Anion Gap 9 3 - 14 mmol/L Final    Glucose 96 70 - 99 mg/dL Final    Urea Nitrogen 17 7 - 30 mg/dL Final    Creatinine 0.66 0.52 - 1.04 mg/dL Final    GFR Estimate >90 >60 mL/min/1.7m2 Final    Non  GFR Calc    GFR  Estimate If Black >90 >60 mL/min/1.7m2 Final    African American GFR Calc    Calcium 8.7 8.5 - 10.1 mg/dL Final         9/24/2017  3:34 PM      Component Results     Component Value Ref Range & Units Status    Troponin I ES <0.015 0.000 - 0.045 ug/L Final    The 99th percentile for upper reference range is 0.045 ug/L.  Troponin values   in the range of 0.045 - 0.120 ug/L may be associated with risks of adverse   clinical events.           9/24/2017  3:23 PM      Narrative     XR CHEST 2 VW 9/24/2017 3:20 PM    HISTORY: Dyspnea.    COMPARISON: None.        Impression     IMPRESSION: The lungs are clear. No focal pulmonary opacities. Heart  and mediastinum are unremarkable. No acute cardiopulmonary  abnormalities.    MANDO FRIEDMAN MD         9/24/2017  3:35 PM      Component Results     Component Value Ref Range & Units Status    HCG Qualitative Serum Negative NEG^Negative Final    This test is for screening purposes.  Results should be interpreted along with   the clinical picture.  Confirmation testing is available if warranted by   ordering CLA189, HCG Quantitative Pregnancy.                  Clinical Quality Measure: Blood Pressure Screening     Your blood pressure was checked while you were in the emergency department today. The last reading we obtained was  BP: 118/75 . Please read the guidelines below about what these numbers mean and what you should do about them.  If your systolic blood pressure (the top number) is less than 120 and your diastolic blood pressure (the bottom number) is less than 80, then your blood pressure is normal. There is nothing more that you need to do about it.  If your systolic blood pressure (the top number) is 120-139 or your diastolic blood pressure (the bottom number) is 80-89, your blood pressure may be higher than it should be. You should have your blood pressure rechecked within a year by a primary care provider.  If your systolic blood pressure (the top number) is 140 or  greater or your diastolic blood pressure (the bottom number) is 90 or greater, you may have high blood pressure. High blood pressure is treatable, but if left untreated over time it can put you at risk for heart attack, stroke, or kidney failure. You should have your blood pressure rechecked by a primary care provider within the next 4 weeks.  If your provider in the emergency department today gave you specific instructions to follow-up with your doctor or provider even sooner than that, you should follow that instruction and not wait for up to 4 weeks for your follow-up visit.        Thank you for choosing [facility]       Thank you for choosing [facility] for your care. Our goal is always to provide you with excellent care. Hearing back from our patients is one way we can continue to improve our services. Please take a few minutes to complete the written survey that you may receive in the mail after you visit with us. Thank you!        [facility] Information     [facility] gives you secure access to your electronic health record. If you see a primary care provider, you can also send messages to your care team and make appointments. If you have questions, please call your primary care clinic.  If you do not have a primary care provider, please call [phone] and they will assist you.        Care EveryWhere ID     This is your Care EveryWhere ID. This could be used by other organizations to access your [facility] medical records  [id number]        Equal Access to Services     EBONI JO : Hadii adriel Dang, waaxda luqadaha, qaybta kaalmada gordy, angela blair. So [facility] [phone].    ATENCIÓN: Si habla español, tiene a quintero disposición servicios gratuitos de asistencia lingüística. Llame al [phone].    We comply with applicable federal civil rights laws and [place] laws. We do not discriminate on the basis of race, color, national origin, age, disability sex, sexual  orientation or gender identity.            After Visit Summary       This is your record. Keep this with you and show to your community pharmacist(s) and doctor(s) at your next visit.

## 2017-09-24 NOTE — DISCHARGE INSTRUCTIONS
Discharge Instructions  Asthma    Asthma is a condition causing narrowing and inflammation of the airways that can make it hard to breathe.  Asthma can also cause cough, wheezing, noisy breathing and tightness in the chest.  Asthma can be brought on or  triggered  by many things, including dust, mold, pollen, cigarette smoke, exercise, stress and infections (like the common cold).     Generally, every Emergency Department visit should have a follow-up clinic visit with either a primary or a specialty clinic/provider. Please follow-up as instructed by your emergency provider today.    Return to the Emergency Department if:    Your breathing gets worse.    You need to use your inhaler more often than every 4 hours, or cannot get relief from your inhaler.    You are very weak, or feel much more ill.    You develop new symptoms, such as chest pain.    You cough up blood.    You are vomiting (throwing up) enough that you cannot keep fluids or your medicine down.    What can I do to help myself?    Fill any prescriptions the provider gave you and take them right away--especially antibiotics. Be sure to finish the whole antibiotic prescription.    You may be given a prescription for an inhaler, which can help loosen tight air passages.  Use this as needed, but not more often than directed. Inhalers work much better when used with a spacer.     You may be given a prescription for a steroid to reduce inflammation. Used long-term, these can have some side effects, but for short-term use they are safe. You may notice restlessness or increased appetite (eating more).        You may use non-prescription cough or cold medicines. Cough medicines may help, but do not make the cough go away completely.     Avoid smoke, because this can make you feel worse. If you smoke, this may be a good time to quit! Consider using nicotine lozenges, gum, or patches to reduce cravings.     If you have a fever, Tylenol  (acetaminophen), Motrin   (ibuprofen), or Advil  (ibuprofen), may help bring fever down and may help you feel more comfortable. Be sure to read and follow the package directions, and ask your provider if you have questions.    Be sure to get your flu shot each year.  For certain age groups, the pneumonia shot can help prevent pneumonia.  It is important that you follow up with your regular provider, to be sure that you are improving from this spell (an acute asthma exacerbation), and also to do what you can to keep from having trouble again. Sometimes you need long-term medicines to keep your asthma under control.   If you were given a prescription for medicine here today, be sure to read all of the information (including the package insert) that comes with your prescription.  This will include important information about the medicine, its side effects, and any warnings that you need to know about.  The pharmacist who fills the prescription can provide more information and answer questions you may have about the medicine.  If you have questions or concerns that the pharmacist cannot address, please call or return to the Emergency Department.   Remember that you can always come back to the Emergency Department if you are not able to see your regular provider in the amount of time listed above, if you get any new symptoms, or if there is anything that worries you.

## 2017-09-25 LAB — INTERPRETATION ECG - MUSE: NORMAL

## 2017-09-26 ENCOUNTER — HOSPITAL ENCOUNTER (EMERGENCY)
Facility: CLINIC | Age: 42
Discharge: HOME | End: 2017-09-26
Attending: EMERGENCY MEDICINE | Admitting: EMERGENCY MEDICINE
Payer: COMMERCIAL

## 2017-09-26 ENCOUNTER — NURSE TRIAGE (OUTPATIENT)
Dept: NURSING | Facility: CLINIC | Age: 42
End: 2017-09-26

## 2017-09-26 VITALS
TEMPERATURE: 97.3 F | BODY MASS INDEX: 28.79 KG/M2 | WEIGHT: 190 LBS | SYSTOLIC BLOOD PRESSURE: 131 MMHG | HEART RATE: 99 BPM | HEIGHT: 68 IN | DIASTOLIC BLOOD PRESSURE: 83 MMHG | OXYGEN SATURATION: 97 % | RESPIRATION RATE: 16 BRPM

## 2017-09-26 DIAGNOSIS — F41.9 ANXIETY: ICD-10-CM

## 2017-09-26 DIAGNOSIS — R06.02 SOB (SHORTNESS OF BREATH): ICD-10-CM

## 2017-09-26 PROCEDURE — 25000132 ZZH RX MED GY IP 250 OP 250 PS 637: Performed by: EMERGENCY MEDICINE

## 2017-09-26 PROCEDURE — 25000125 ZZHC RX 250

## 2017-09-26 PROCEDURE — 94640 AIRWAY INHALATION TREATMENT: CPT

## 2017-09-26 PROCEDURE — 99283 EMERGENCY DEPT VISIT LOW MDM: CPT | Mod: 25

## 2017-09-26 RX ORDER — DEXTROMETHORPHAN POLISTIREX 30 MG/5ML
30 SUSPENSION ORAL 2 TIMES DAILY
Qty: 89 ML | Refills: 0 | Status: SHIPPED | OUTPATIENT
Start: 2017-09-26 | End: 2017-11-15

## 2017-09-26 RX ORDER — LIDOCAINE 40 MG/G
CREAM TOPICAL
Status: DISCONTINUED | OUTPATIENT
Start: 2017-09-26 | End: 2017-09-26 | Stop reason: CLARIF

## 2017-09-26 RX ORDER — IPRATROPIUM BROMIDE AND ALBUTEROL SULFATE 2.5; .5 MG/3ML; MG/3ML
SOLUTION RESPIRATORY (INHALATION)
Status: COMPLETED
Start: 2017-09-26 | End: 2017-09-26

## 2017-09-26 RX ORDER — LORAZEPAM 1 MG/1
1 TABLET ORAL ONCE
Status: COMPLETED | OUTPATIENT
Start: 2017-09-26 | End: 2017-09-26

## 2017-09-26 RX ADMIN — IPRATROPIUM BROMIDE AND ALBUTEROL SULFATE 3 ML: .5; 3 SOLUTION RESPIRATORY (INHALATION) at 18:33

## 2017-09-26 RX ADMIN — LORAZEPAM 1 MG: 1 TABLET ORAL at 18:33

## 2017-09-26 ASSESSMENT — ENCOUNTER SYMPTOMS
COUGH: 1
SHORTNESS OF BREATH: 1

## 2017-09-26 NOTE — ED AVS SNAPSHOT
[facility] Emergency Department    [address]    [facility] [address]    Phone:  [phone]    Fax:  [phone]                                       Maritza Hanson   MRN: [id number]    Department:  [facility] Emergency Department   Date of Visit:  9/26/2017           After Visit Summary Signature Page     I have received my discharge instructions, and my questions have been answered. I have discussed any challenges I see with this plan with the nurse or doctor.    ..........................................................................................................................................  Patient/Patient Representative Signature      ..........................................................................................................................................  Patient Representative Print Name and Relationship to Patient    ..................................................               ................................................  Date                                            Time    ..........................................................................................................................................  Reviewed by Signature/Title    ...................................................              ..............................................  Date                                                            Time

## 2017-09-26 NOTE — ED NOTES
"Pt here with c/o \"asthma attack\" Pt reports being sick with a cough for 14 days. Pt was seen here on Sunday and sent home with prednisone. Pt reports taking 2 albuterol nebs at home \"which the second one helped a little\". Pt anxious on arrival. Spouse at bed side.   "

## 2017-09-26 NOTE — ED PROVIDER NOTES
History     Chief Complaint:  Shortness of Breath      HPI   Maritza Hanson is a 42 year old female who presents with shortness of breath. The patient states that she has had cold symptoms of a productive cough, stuffy nose, and congestion for the last 14 days. Last week she completed a course of Azithromycin and Prednisone. She presented to the ED on Sunday for shortness of breath where a chest xray was performed which was negative and was prescribed a two day prescription of Dexamethasone. She presents to the ED today, once again for shortness of breath. She reports to albuterol nebs at home, with the second one providing some relief. She also received another nebulization here. Patient states that she thinks that her anxiety may be contributing to her symptoms. Patient denies all other complaints.     Allergies:  Hydrocodone  Sulfa Drugs      Medications:    Sertraline   Prednisone  Decadron  Zyrtec  Montelukast Sodium  Qvar  Neurontin     Past Medical History:    Anemia  Depression with Anxiety  History of Tics  Preeclampsia  POTS  Asthma  GERD  Hyperlipidemia  Laryngeal Granuloma  Vitamin D Deficiency  RLS    Past Surgical History:    ENT - [place] Teeth Extraction  Orthopedic Surgery     Family History:    Breast Cancer  Thyroid Disease  Psychotic disorder  Obesity  Diabetes  Hearing Loss   Colon Cancer    Social History:  Presents with    Tobacco use: never  Alcohol use: very rarely  PCP: Ollie Loera PA-C    Marital Status:       Review of Systems   HENT: Positive for congestion.    Respiratory: Positive for cough and shortness of breath.    All other systems reviewed and are negative.    Physical Exam     Patient Vitals for the past 24 hrs:   BP Temp Pulse Resp SpO2 Height Weight   09/26/17 1932 127/79 - - - 94 % - -   09/26/17 1915 - - - - 94 % - -   09/26/17 1845 - - - - 97 % - -   09/26/17 1830 - 97.3  F (36.3  C) 85 - - - -   09/26/17 1815 - - - - 99 % - -   09/26/17 1810 151/90 -  "104 22 96 % 1.727 m (5' 8\") 86.2 kg (190 lb)   09/26/17 1808 151/90 - - - (!) 87 % - -      Physical Exam  Constitutional: Alert, attentive, GCS 15,   HENT:  TMs clear bilaterally   Nose: Nose normal.    Mouth/Throat: Oropharynx is clear, mucous membranes are moist   Eyes: Normal conjunctiva. Pupils are equal, round, and reactive to light.   CV: tachycardic rate and regular rhythm; no murmurs, rubs or gallups  Chest: Tachypnic and breath sounds normal. No increased work of breathing or retractions.  No wheezing, rhonchi, or rales.    GI:  There is no tenderness. No distension. Normal bowel sounds  MSK: Normal range of motion.   Neurological: Alert, attentive  Skin: Skin is warm and dry.   Psych: appears anxious    Emergency Department Course   Interventions:  1833: Duoneb 3 mL Nebulization  1833: Ativan 1 mg PO    The patient's symptoms were partially improved with parenteral benzodiazapine's.    Emergency Department Course:  Past medical records, nursing notes, and vitals reviewed.  1818: I performed an exam of the patient and obtained history, as documented above.  Above interventions provided.   2038: I rechecked the patient who's lungs are clears.  I personally reviewed the laboratory results with the Patient and spouse and answered all related questions prior to discharge.  2039: Findings and plan explained to the Patient and spouse. Patient discharged home with instructions regarding supportive care, medications, and reasons to return. The importance of close follow-up was reviewed.        Impression & Plan    Medical Decision Making:  Maritza Hanson is a 42 year old female with a PMH of asthma who presents for evaluation of shortness of breath.  She has cough predominant asthma without wheezing by her report.  A broad differential was considered including foreign body, asthma, pneumonia, bronchitis, reactive airway disease, pneumothorax, cardiac equivalent, viral induced wheezing, allergic phenomena, etc. "  It does seem as though her tachypnea was caused more by anxiety (possibly 2/2 albuterol treatments at home) then respiratory distress from asthma.  She felt improved after ativan.  Had road test and showed no signs of respiratory distress or drop in pulse oximeter.  I think this is a resolving viral URI and she has already had two course of steroids..  No indication for hospitalization at this time including no hypoxia, no marked increase in respiratory rate, minimal to no retractions.   upportive outpatient management is indicated, medications for discharge noted above.  Close follow up with primary care physician.  Return if increased wheezing, progressive shortness of breath, develops fever greater than 102.        Diagnosis:    ICD-10-CM   1. SOB (shortness of breath) R06.02   2. Anxiety F41.9       Disposition:  Discharged to home with plan as outlined.     Discharge Medications:  New Prescriptions    No medications on file           9/26/2017   [facility] EMERGENCY DEPARTMENT  Awilda PATEL, am serving as a scribe at 6:18 PM on 9/26/2017 to document services personally performed by Lexi Chawla MD based on my observations and the provider's statements to me.       Lexi Chawla MD  09/27/17 0125

## 2017-09-26 NOTE — TELEPHONE ENCOUNTER
"  Reason for Disposition    [1] MILD asthma attack (e.g., no SOB at rest, mild SOB with walking, speaks normally in sentences, mild wheezing) AND [2]  persists > 24 hours on appropriate treatment     \"I was seen in the ER (see epic) for my asthma and given a neb and decadron. I just had another attack while doing dishes, I am ok now, but should I go back in to get more decadron?\" Denies worsening sx at this time. I advised if the instruction /medications are not helping then return to ER. Otherwise follow up with PCP(non [facility])    Additional Information    Negative: Severe difficulty breathing (e.g., struggling for each breath, speak in single words, pulse > 120)    Negative: Bluish lips, tongue, or face now    Negative: Difficult to awaken or acting confused  (e.g., disoriented, slurred speech)    Negative: Passed out (i.e., lost consciousness, collapsed and was not responding)    Negative: Wheezing started suddenly after medicine, an allergic food, or bee sting    Negative: Sounds like a life-threatening emergency to the triager    Negative: [1] SEVERE asthma attack (e.g., very SOB at rest, speaks in single words, loud wheezes) AND [2] not resolved after 2 nebulizer or inhaler treatments    Negative: Peak flow rate less than 50% of baseline level (RED zone)    Negative: [1] Severe wheezing or coughing AND [2] doesn't have neb or inhaler available    Negative: Chest pain    Negative: [1] Hospitalized before with asthma AND [2] feels the same now    Negative: [1] MODERATE asthma attack (e.g., SOB at rest, speaks in phrases, audible wheezes) AND [2] not resolved after 2 nebulizer or inhaler treatments given 20 minutes apart    Negative: [1] Peak flow rate 50-80% of baseline level (YELLOW zone) AND [2] not resolved after 2 nebulizer or inhaler treatments given 20 minutes apart    Negative: Asthma medicine (nebulizer or inhaler) is needed more frequently than q 4 hours to keep you comfortable    Negative: Fever > 103 F " (39.4 C)    Negative: [1] Fever > 101 F (38.3 C) AND [2] age > 60    Negative: Patient sounds very sick or weak to the triager    Negative: [1] Coughing continuously (nonstop) AND [2] keeps from working or sleeping AND [3] not improved after 2 nebulizer or inhaler treatments given 20 minutes apart    Negative: [1] Wheezing or coughing AND [2] hasn't used neb or inhaler twice AND [3] it's available    Negative: [1] Influenza prevalent in community (or household) AND [2] flu symptoms (e.g., cough WITH fever, etc) AND [3] onset < 48 hours ago    Protocols used: ASTHMA ATTACK-ADULT-

## 2017-09-26 NOTE — ED AVS SNAPSHOT
[facility] Emergency Department    [address] [facility] [address]    Phone:  [phone]    Fax:  [phone]                                       Maritza Hanson   MRN: [id number]    Department:  [facility] Emergency Department   Date of Visit:  9/26/2017           Patient Information     Date Of Birth          1975        Your diagnoses for this visit were:     SOB (shortness of breath)     Anxiety        You were seen by Lexi Chawla MD.      Follow-up Information     Follow up with Ollie Loera PA-C In 2 days.    Specialty:  Physician Assistant    Why:  as previously scheduled    Contact information:    [facility]  [address] [facility] [address] [phone]          Follow up with [facility] Emergency Department.    Specialty:  EMERGENCY MEDICINE    Why:  If symptoms worsen    Contact information:    [address] [facility] [address]  [phone]        Discharge Instructions         Shortness of Breath (Dyspnea)  Shortness of breath is the feeling that you can't catch your breath or get enough air. It is also known as dyspnea.  Dyspnea can be caused by many different conditions. They include:    Acute asthma attack.    Worsening of chronic lung diseases such as chronic bronchitis and emphysema.    Heart failure. This is when weak heart muscle allows extra fluid to collect in the lungs.    Panic attacks or anxiety. Fear can cause rapid breathing (hyperventilation).    Pneumonia, or an infection in the lung tissue.    Exposure to toxic substances, fumes, smoke, or certain medicines.    Blood clot in the lung (pulmonary embolism). This is often from a piece of blood clot in a deep vein of the leg (deep vein thrombosis) that breaks off and travels to the lungs.    Heart attack or heart-related chest pain (angina).    Anemia.    Collapsed lung (pneumothorax).    Dehydration.    Pregnancy.  Based on your  visit today, the exact cause of your shortness of breath is not certain. Your tests don t show any of the serious causes of dyspnea. You may need other tests to find out if you have a serious problem. It s important to watch for any new symptoms or symptoms that get worse. Follow up with your healthcare provider as directed.  Home care  Follow these tips to take care of yourself at home:    When your symptoms are better, go back to your usual activities.    If you smoke, you should stop. Join a quit-smoking program or ask your healthcare provider for help.    Eat a healthy diet and get plenty of sleep.    Get regular exercise. Talk with your healthcare provider before starting to exercise, especially if you have other medical problems.    Cut down on the amount of caffeine and stimulants you consume.  Follow-up care  Follow up with your healthcare provider, or as advised.  If tests were done, you will be told if your treatment needs to be changed. You can call as directed for the results.  (Note: If an X-ray was taken, a specialist will review it. You will be notified of any new findings that may affect your care.)  Call 911 or get immediate medical care  Shortness of breath may be a sign of a serious medical problem. For example, it may be a problem with your heart or lungs. Call 911 if you have worsening shortness of breath or trouble breathing, especially with any of the symptoms below:    You are confused or it s difficult to wake you.    You faint or lose consciousness.    You have a fast heartbeat, or your heartbeat is irregular.    You are coughing up blood.    You have pain in your chest, arm, shoulder, neck, or upper back.    You break out in a sweat.  When to seek medical advice  Call your healthcare provider right away if any of these occur:    Slight shortness of breath or wheezing    Redness, pain or swelling in your leg, arm, or other body area    Swelling in both legs or ankles    Fast weight  gain    Dizziness or weakness    Fever of 100.4 F (38 C) or higher, or as directed by your healthcare provider  Date Last Reviewed: 9/13/2015 2000-2017 [employer]. [address] [place] [address], [place] [address]. All rights reserved. This information is not intended as a substitute for professional medical care. Always follow your healthcare professional's instructions.          Future Appointments        Provider Department Dept Phone Center    11/15/2017 10:00 AM [facility] [phone] [facility]    11/15/2017 11:20 AM [facility] MAMMO ROOM 1 [facility] [phone] [facility]      24 Hour Appointment Hotline       To make an appointment at any [facility] clinic, call [phone] ([phone]). If you don't have a family doctor or clinic, we will help you find one. [facility] clinics are conveniently located to serve the needs of you and your family.             Review of your medicines      START taking        Dose / Directions Last dose taken    dextromethorphan 30 MG/5ML liquid   Commonly known as:  DELSYM   Dose:  30 mg   Quantity:  89 mL        Take 5 mLs (30 mg) by mouth 2 times daily   Refills:  0          Our records show that you are taking the medicines listed below. If these are incorrect, please call your family doctor or clinic.        Dose / Directions Last dose taken    dexamethasone 4 MG tablet   Commonly known as:  DECADRON   Dose:  12 mg   Quantity:  3 tablet        Take 3 tablets (12 mg) by mouth once for 1 dose   Refills:  0        gabapentin 100 MG capsule   Commonly known as:  NEURONTIN   Dose:  600 mg        Take 600 mg by mouth At Bedtime   Refills:  0        MONTELUKAST SODIUM PO   Dose:  10 mg        Take 10 mg by mouth At Bedtime   Refills:  0        PREDNISONE PO   Dose:  20 mg        Take 20 mg by mouth daily   Refills:  0        QVAR 80 MCG/ACT Inhaler   Dose:  2 puff   Generic drug:   beclomethasone        Inhale 2 puffs into the lungs 2 times daily   Refills:  0        SERTRALINE HCL PO   Dose:  100 mg        Take 100 mg by mouth daily   Refills:  0        ZYRTEC ALLERGY PO   Dose:  10 mg        Take 10 mg by mouth 2 times daily   Refills:  0                Prescriptions were sent or printed at these locations (1 Prescription)                   Other Prescriptions                Printed at Department/Unit printer (1 of 1)         dextromethorphan (DELSYM) 30 MG/5ML liquid                Orders Needing Specimen Collection     None      Pending Results     No orders found from 9/24/2017 to 9/27/2017.            Pending Culture Results     No orders found from 9/24/2017 to 9/27/2017.            Pending Results Instructions     If you had any lab results that were not finalized at the time of your Discharge, you can call the ED Lab Result RN at [phone]. You will be contacted by this team for any positive Lab results or changes in treatment. The nurses are available 7 days a week from 10A to 6:30P.  You can leave a message 24 hours per day and they will return your call.        Test Results From Your Hospital Stay               Clinical Quality Measure: Blood Pressure Screening     Your blood pressure was checked while you were in the emergency department today. The last reading we obtained was  BP: 127/79 . Please read the guidelines below about what these numbers mean and what you should do about them.  If your systolic blood pressure (the top number) is less than 120 and your diastolic blood pressure (the bottom number) is less than 80, then your blood pressure is normal. There is nothing more that you need to do about it.  If your systolic blood pressure (the top number) is 120-139 or your diastolic blood pressure (the bottom number) is 80-89, your blood pressure may be higher than it should be. You should have your blood pressure rechecked within a year by a primary care provider.  If your  systolic blood pressure (the top number) is 140 or greater or your diastolic blood pressure (the bottom number) is 90 or greater, you may have high blood pressure. High blood pressure is treatable, but if left untreated over time it can put you at risk for heart attack, stroke, or kidney failure. You should have your blood pressure rechecked by a primary care provider within the next 4 weeks.  If your provider in the emergency department today gave you specific instructions to follow-up with your doctor or provider even sooner than that, you should follow that instruction and not wait for up to 4 weeks for your follow-up visit.        Thank you for choosing [facility]       Thank you for choosing [facility] for your care. Our goal is always to provide you with excellent care. Hearing back from our patients is one way we can continue to improve our services. Please take a few minutes to complete the written survey that you may receive in the mail after you visit with us. Thank you!        [employer] Information     [email or web address] gives you secure access to your electronic health record. If you see a primary care provider, you can also send messages to your care team and make appointments. If you have questions, please call your primary care clinic.  If you do not have a primary care provider, please call [phone] and they will assist you.        Care EveryWhere ID     This is your Care EveryWhere ID. This could be used by other organizations to access your [facility] medical records  [id number]        Equal Access to Services     EBONI JO : Rody Dang, waaxda luqnamrata, qaybta kaalangela meng. So [facility] [phone].    ATENCIÓN: Si habla español, tiene a quintero disposición servicios gratuitos de asistencia lingüística. Llame al [phone].    We comply with applicable federal civil rights laws and [place] laws. We do not discriminate on the basis of race,  color, national origin, age, disability sex, sexual orientation or gender identity.            After Visit Summary       This is your record. Keep this with you and show to your community pharmacist(s) and doctor(s) at your next visit.

## 2017-09-27 NOTE — DISCHARGE INSTRUCTIONS
Shortness of Breath (Dyspnea)  Shortness of breath is the feeling that you can't catch your breath or get enough air. It is also known as dyspnea.  Dyspnea can be caused by many different conditions. They include:    Acute asthma attack.    Worsening of chronic lung diseases such as chronic bronchitis and emphysema.    Heart failure. This is when weak heart muscle allows extra fluid to collect in the lungs.    Panic attacks or anxiety. Fear can cause rapid breathing (hyperventilation).    Pneumonia, or an infection in the lung tissue.    Exposure to toxic substances, fumes, smoke, or certain medicines.    Blood clot in the lung (pulmonary embolism). This is often from a piece of blood clot in a deep vein of the leg (deep vein thrombosis) that breaks off and travels to the lungs.    Heart attack or heart-related chest pain (angina).    Anemia.    Collapsed lung (pneumothorax).    Dehydration.    Pregnancy.  Based on your visit today, the exact cause of your shortness of breath is not certain. Your tests don t show any of the serious causes of dyspnea. You may need other tests to find out if you have a serious problem. It s important to watch for any new symptoms or symptoms that get worse. Follow up with your healthcare provider as directed.  Home care  Follow these tips to take care of yourself at home:    When your symptoms are better, go back to your usual activities.    If you smoke, you should stop. Join a quit-smoking program or ask your healthcare provider for help.    Eat a healthy diet and get plenty of sleep.    Get regular exercise. Talk with your healthcare provider before starting to exercise, especially if you have other medical problems.    Cut down on the amount of caffeine and stimulants you consume.  Follow-up care  Follow up with your healthcare provider, or as advised.  If tests were done, you will be told if your treatment needs to be changed. You can call as directed for the results.  (Note:  If an X-ray was taken, a specialist will review it. You will be notified of any new findings that may affect your care.)  Call 911 or get immediate medical care  Shortness of breath may be a sign of a serious medical problem. For example, it may be a problem with your heart or lungs. Call 911 if you have worsening shortness of breath or trouble breathing, especially with any of the symptoms below:    You are confused or it s difficult to wake you.    You faint or lose consciousness.    You have a fast heartbeat, or your heartbeat is irregular.    You are coughing up blood.    You have pain in your chest, arm, shoulder, neck, or upper back.    You break out in a sweat.  When to seek medical advice  Call your healthcare provider right away if any of these occur:    Slight shortness of breath or wheezing    Redness, pain or swelling in your leg, arm, or other body area    Swelling in both legs or ankles    Fast weight gain    Dizziness or weakness    Fever of 100.4 F (38 C) or higher, or as directed by your healthcare provider  Date Last Reviewed: 9/13/2015 2000-2017 [employer]. [address] [place] [address]. All rights reserved. This information is not intended as a substitute for professional medical care. Always follow your healthcare professional's instructions.

## 2017-09-28 ENCOUNTER — APPOINTMENT (OUTPATIENT)
Dept: CT IMAGING | Facility: CLINIC | Age: 42
End: 2017-09-28
Attending: EMERGENCY MEDICINE
Payer: COMMERCIAL

## 2017-09-28 ENCOUNTER — HOSPITAL ENCOUNTER (EMERGENCY)
Facility: CLINIC | Age: 42
Discharge: HOME | End: 2017-09-28
Attending: EMERGENCY MEDICINE | Admitting: EMERGENCY MEDICINE
Payer: COMMERCIAL

## 2017-09-28 VITALS
BODY MASS INDEX: 28.79 KG/M2 | HEART RATE: 87 BPM | TEMPERATURE: 98.4 F | RESPIRATION RATE: 18 BRPM | WEIGHT: 190 LBS | SYSTOLIC BLOOD PRESSURE: 114 MMHG | HEIGHT: 68 IN | OXYGEN SATURATION: 97 % | DIASTOLIC BLOOD PRESSURE: 74 MMHG

## 2017-09-28 DIAGNOSIS — A18.01 POTT'S DISEASE: ICD-10-CM

## 2017-09-28 DIAGNOSIS — Z86.59 HISTORY OF ANXIETY: ICD-10-CM

## 2017-09-28 DIAGNOSIS — J45.901 ASTHMA EXACERBATION: ICD-10-CM

## 2017-09-28 DIAGNOSIS — Z87.09 HISTORY OF ASTHMA: ICD-10-CM

## 2017-09-28 PROCEDURE — 71260 CT THORAX DX C+: CPT

## 2017-09-28 PROCEDURE — 99285 EMERGENCY DEPT VISIT HI MDM: CPT | Mod: 25

## 2017-09-28 PROCEDURE — 25000128 H RX IP 250 OP 636: Performed by: EMERGENCY MEDICINE

## 2017-09-28 PROCEDURE — 40000275 ZZH STATISTIC RCP TIME EA 10 MIN

## 2017-09-28 RX ORDER — IOPAMIDOL 755 MG/ML
500 INJECTION, SOLUTION INTRAVASCULAR ONCE
Status: COMPLETED | OUTPATIENT
Start: 2017-09-28 | End: 2017-09-28

## 2017-09-28 RX ADMIN — SODIUM CHLORIDE 84 ML: 9 INJECTION, SOLUTION INTRAVENOUS at 12:56

## 2017-09-28 RX ADMIN — IOPAMIDOL 70 ML: 755 INJECTION, SOLUTION INTRAVENOUS at 12:56

## 2017-09-28 ASSESSMENT — ENCOUNTER SYMPTOMS
FATIGUE: 1
FEVER: 0
SHORTNESS OF BREATH: 1
COUGH: 1
SLEEP DISTURBANCE: 1

## 2017-09-28 NOTE — ED NOTES
Pt provided with discharge paperwork and educated on recommended follow-up with PCP. Pt educated on continuing asthma plan that is in place. Pt voiced understanding and denied any questions at discharge.

## 2017-09-28 NOTE — ED NOTES
Pt ambulated with pulse ox. Pt's O2 sats got down to 95% at the lowest with HR elevated 110-130. MD notified and aware.

## 2017-09-28 NOTE — DISCHARGE INSTRUCTIONS
Follow up with your PCP tomorrow for re-evaluation of heart rate with upright position and discuss treatment for anxiety.

## 2017-09-28 NOTE — ED NOTES
Pt arrives to the ED for cough and shortness of breath that started 16 days ago. Pt states she has been seen in the ED 2 times since Sunday. Pt was started on prednisone and azithromycin by her clinic 5 days prior to her first ER visit and then dexamethasone on Monday from the ED. Pt states she is having no improvement and her energy level is low. H/o asthma.

## 2017-09-28 NOTE — ED PROVIDER NOTES
History     Chief Complaint:  Shortness of Breath and Cough    HPI   Maritza Hanson is a 42 year old female who presents to the emergency department today for evaluation of shortness of breath and a cough. The patient reports a persistent cough and shortness of breath secondary to her asthma for the patient 16 days, worse with movement. Per chart review, the patient was seen and evaluated in the ED four days ago for similar symptoms. At this time, she had a chest x-ray performed with results as per below and treated with two DuoNeb's and decadron as well as a prescriptions for decadron. However, she returned to the ED two days ago with continued symptoms. She was given another DuoNeb and a dose of ativan with improvement. However, she followed up with her PCP Dr. Tom who referred her back to the ED. Patient's had an albuterol neb last night, and notes her symptoms are worsening. She also notes not being able to sleep well and low energy level. The patient has no other concerns at this time.     [facility] Emergency Department - 9/26/17  ECG (14:51:10):  Rate 82 bpm.   QT/QTc 394/460.   P-R-T axes 43.   Normal sinus rhythm  Non-specific T wave abnormality - unchanged since 10/29/13  Interpreted at 1458 by Gavino Gabriel MD.    Imaging:  Chest XR,  PA & LAT  Final Result  IMPRESSION: The lungs are clear. No focal pulmonary opacities. Heart  and mediastinum are unremarkable. No acute cardiopulmonary  abnormalities.  MANDO FRIEDMAN MD    Laboratory:  CBC (platelets, no diff)   Result Value Ref Range     WBC 8.0 4.0 - 11.0 10e9/L     RBC Count 4.04 3.8 - 5.2 10e12/L     Hemoglobin 12.7 11.7 - 15.7 g/dL     Hematocrit 36.5 35.0 - 47.0 %     MCV 90 78 - 100 fl     MCH 31.4 26.5 - 33.0 pg     MCHC 34.8 31.5 - 36.5 g/dL     RDW 12.1 10.0 - 15.0 %     Platelet Count 209 150 - 450 10e9/L   Basic metabolic panel (BMP)   Result Value Ref Range     Sodium 139 133 - 144 mmol/L     Potassium 3.0 (L) 3.4 - 5.3  "mmol/L     Chloride 104 94 - 109 mmol/L     Carbon Dioxide 26 20 - 32 mmol/L     Anion Gap 9 3 - 14 mmol/L     Glucose 96 70 - 99 mg/dL     Urea Nitrogen 17 7 - 30 mg/dL     Creatinine 0.66 0.52 - 1.04 mg/dL     GFR Estimate >90 >60 mL/min/1.7m2     GFR Estimate If Black >90 >60 mL/min/1.7m2     Calcium 8.7 8.5 - 10.1 mg/dL   Troponin I   Result Value Ref Range     Troponin I ES <0.015 0.000 - 0.045 ug/L   HCG QUALitative pregnancy (blood)   Result Value Ref Range     HCG Qualitative Serum Negative NEG^Negative       Allergies:  Hydrocodone  Sulfa Drugs       Medications:    Sertraline   Prednisone  Decadron  Zyrtec  Montelukast Sodium  Qvar  Neurontin      Past Medical History:    Anemia  Depression with Anxiety  History of Tics  Preeclampsia  POTS  Asthma  GERD  Hyperlipidemia  Laryngeal Granuloma  Vitamin D Deficiency  RLS  postural orthostatic tachycardic syndrome     Past Surgical History:    ENT - [employer] Teeth Extraction  Orthopedic Surgery      Family History:    Breast Cancer  Thyroid Disease  Psychotic disorder  Obesity  Diabetes  Hearing Loss   Colon Cancer    Social History:  The patient was alone.  Smoking Status: Never  Smokeless Tobacco: Never  Alcohol Use: Yes, very rarely  Marital Status:        Review of Systems   Constitutional: Positive for fatigue. Negative for fever.   Respiratory: Positive for cough and shortness of breath.    Psychiatric/Behavioral: Positive for sleep disturbance.   All other systems reviewed and are negative.    Physical Exam   First Vitals:  BP: (!) 144/97  Pulse: 87  Heart Rate: 87  Temp: 98.4  F (36.9  C)  Resp: 18  Height: 172.7 cm (5' 8\")  Weight: 86.2 kg (190 lb)  SpO2: 96 %      Physical Exam   General: Sitting on the bed, comfortable appearing.   HEENT:   The scalp and head appear normal    The pupils are equal, round, and reactive to light    Extraocular muscles are intact.    The nose is normal.    The oropharynx is normal.      Uvula is in the midline.  " There is no peritonsillar abscess.  Neck:  Normal range of motion.    Lungs:  Clear.      No rales, no wheezing.      There is no tachypnea.  Non-labored.  Cardiac: Regular rate.      Normal S1 and S2.      No S3 or S4.      No pathological murmur.      No pericardial rub.  Abdomen: Soft. No distension. No tympani. No rebound. Non-tender.  Lymph: No anterior or posterior cervical lymphadenopathy noted.  MS:  Normal tone.      Normal movement of all extremities.    Neuro:  Normal mentation.  No focal motor or sensory changes.      Speech normal.  Psych:  Awake.     Alert.      Normal affect.      Appropriate interactions.  Skin:  No rash.      No lesions.        Emergency Department Course     Imaging:  Radiology findings were communicated with the patient who voiced understanding of the findings.  Chest CT IV Contrast Only - PE Protocol   IMPRESSION:  1. No evidence of pulmonary embolism or other acute chest abnormality.  2. Enlarged fatty infiltrated liver.  Report per radiology     Emergency Department Course:  Nursing notes and vitals reviewed.  The patient was sent for a Chest CT IV Contrast Only - PE Protocol while in the emergency department, results above.   1155: I performed an exam of the patient as documented above.   1207: I spoke with the PCP, Dr. Tom, who referred the patient to the ED. They state they sent the patient for a PE rule out.   1325: Patient rechecked and updated.   Findings and plan explained to the Patient. Patient discharged home with instructions regarding supportive care, medications, and reasons to return. The importance of close follow-up was reviewed.  I personally reviewed the imaging results with the Patient and answered all related questions prior to discharge.    Impression & Plan      Medical Decision Making:  This patient comes in from clinic being sent over by Dr. Tom for further evaluation for shortness of breath. The patient's been seen here twice for presumably asthma.  She did report that she developed an upper respiratory infection about a week ago that went into her chest and this past week she felt more short of breath. However, review of the charts does not convincingly state acute asthma exacerbations. Her lungs were completely clear here. I had her upright and ambulatory with a pulse ox on and her sats did not drop below the upper 90's, but her heart rate went into the 120's to 130 with exertion.     She states that sometimes it is confusing for her to know if it's her asthma causing her to feel short of breath with exertion versus her POTS. She has a history of postural orthostatic tachycardic syndrome and has been told that she needs to push fluids which she doesn't think she's been doing lately.     Because of the clear lungs with rapid heart rate and the sensation of shortness of breath, a CT was done to rule out PE. It was negative. There is no evidence of pericardial effusion and there is no evidence of congestive heart failure, occult pneumonia or pneumothorax. Previously on a visit here, she had an EKG done which was normal and a troponin that was normal.     The patient's thinks that this is probably her pots, but again it is difficult for her at times to differentiate that from her asthma exacerbations.     I have instructed her to increase her fluid intake including Gatorade, Powerade, water, and follow up with her PCP tomorrow for reevaluation. She also has a history of anxiety and does admit that she's continuing her Zoloft, and does not feel more anxious than usual.     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 History of asthma Z87.09    3. Asthma exacerbation J45.901    4. History of anxiety Z86.59        Disposition:  discharged to home    Scribe Disclosure:  Lizbeth PATEL , am serving as a scribe at 11:44 AM on 9/28/2017 to document services personally performed by Lucio Fitch MD based on my observations and the provider's statements  to me.     9/28/2017   [facility] EMERGENCY DEPARTMENT       Lucio Fitch MD  09/29/17 0723

## 2017-09-28 NOTE — ED AVS SNAPSHOT
[facility] Emergency Department    [address]    [facility] [address]    Phone:  [phone]    Fax:  [phone]                                       Maritza Hanson   MRN: [id number]    Department:  [facility] Emergency Department   Date of Visit:  9/28/2017           After Visit Summary Signature Page     I have received my discharge instructions, and my questions have been answered. I have discussed any challenges I see with this plan with the nurse or doctor.    ..........................................................................................................................................  Patient/Patient Representative Signature      ..........................................................................................................................................  Patient Representative Print Name and Relationship to Patient    ..................................................               ................................................  Date                                            Time    ..........................................................................................................................................  Reviewed by Signature/Title    ...................................................              ..............................................  Date                                                            Time

## 2017-09-28 NOTE — ED AVS SNAPSHOT
[facility] Emergency Department    [address]    [facility] [address]    Phone:  [phone]    Fax:  [phone]                                       Maritza Hanson   MRN: [id number]    Department:  [facility] Emergency Department   Date of Visit:  9/28/2017           Patient Information     Date Of Birth          1975        Your diagnoses for this visit were:     Pott's disease     History of asthma     Asthma exacerbation        You were seen by Lucio Fitch MD.      Follow-up Information     Follow up with Ollie Loera PA-C In 1 day.    Specialty:  Physician Assistant    Contact information:    [facility]  [address] [facility] [address] [phone]          Discharge Instructions       Continue present treatment for asthma.  Follow up with your PCP tomorrow for re-evaluation of heart rate with upright position.    Future Appointments        Provider Department Dept Phone Center    11/15/2017 10:00 AM [facility] [phone] [facility]    11/15/2017 11:20 AM [facility] MAMMO ROOM 1 [facility] [phone] [facility]      24 Hour Appointment Hotline       To make an appointment at any [facility], call [phone] ([phone]). If you don't have a family doctor or clinic, we will help you find one. [place] clinics are conveniently located to serve the needs of you and your family.             Review of your medicines      Our records show that you are taking the medicines listed below. If these are incorrect, please call your family doctor or clinic.        Dose / Directions Last dose taken    dexamethasone 4 MG tablet   Commonly known as:  DECADRON   Dose:  12 mg   Quantity:  3 tablet        Take 3 tablets (12 mg) by mouth once for 1 dose   Refills:  0        dextromethorphan 30 MG/5ML liquid   Commonly known as:  DELSYM   Dose:  30 mg   Quantity:  89 mL         Take 5 mLs (30 mg) by mouth 2 times daily   Refills:  0        gabapentin 100 MG capsule   Commonly known as:  NEURONTIN   Dose:  600 mg        Take 600 mg by mouth At Bedtime   Refills:  0        MONTELUKAST SODIUM PO   Dose:  10 mg        Take 10 mg by mouth At Bedtime   Refills:  0        PREDNISONE PO   Dose:  20 mg        Take 20 mg by mouth daily   Refills:  0        QVAR 80 MCG/ACT Inhaler   Dose:  2 puff   Generic drug:  beclomethasone        Inhale 2 puffs into the lungs 2 times daily   Refills:  0        SERTRALINE HCL PO   Dose:  100 mg        Take 100 mg by mouth daily   Refills:  0        ZYRTEC ALLERGY PO   Dose:  10 mg        Take 10 mg by mouth 2 times daily   Refills:  0                Procedures and tests performed during your visit     Chest CT, IV contrast only - PE protocol      Orders Needing Specimen Collection     None      Pending Results     Date and Time Order Name Status Description    9/28/2017 1209 Chest CT, IV contrast only - PE protocol Preliminary             Pending Culture Results     No orders found from 9/26/2017 to 9/29/2017.            Pending Results Instructions     If you had any lab results that were not finalized at the time of your Discharge, you can call the ED Lab Result RN at [phone]. You will be contacted by this team for any positive Lab results or changes in treatment. The nurses are available 7 days a week from 10A to 6:30P.  You can leave a message 24 hours per day and they will return your call.        Test Results From Your Hospital Stay        9/28/2017  1:12 PM      Narrative     CT CHEST PULMONARY EMBOLISM WITH CONTRAST September 28, 2017 1:05 PM     HISTORY: Shortness of breath.    TECHNIQUE: 70mL Isovue-370. CT images of the chest after  administration of nonionic intravenous contrast timed for maximal  opacification of the pulmonary arteries. Radiation dose for this scan  was reduced using automated exposure control, adjustment of the  mA  and/or kV according to patient size, or iterative reconstruction  technique.    COMPARISON: None.    FINDINGS: No evidence of pulmonary embolism. No thoracic aortic  aneurysm or dissection. No pneumothorax. No pleural or pericardial  effusion. No pulmonary nodule or mass. No mediastinal, hilar, or  axillary adenopathy. Thyroid gland appears normal. Images through the  upper abdomen demonstrates an enlarged fatty infiltrated liver.        Impression     IMPRESSION:  1. No evidence of pulmonary embolism or other acute chest abnormality.  2. Enlarged fatty infiltrated liver.                Clinical Quality Measure: Blood Pressure Screening     Your blood pressure was checked while you were in the emergency department today. The last reading we obtained was  BP: 126/77 . Please read the guidelines below about what these numbers mean and what you should do about them.  If your systolic blood pressure (the top number) is less than 120 and your diastolic blood pressure (the bottom number) is less than 80, then your blood pressure is normal. There is nothing more that you need to do about it.  If your systolic blood pressure (the top number) is 120-139 or your diastolic blood pressure (the bottom number) is 80-89, your blood pressure may be higher than it should be. You should have your blood pressure rechecked within a year by a primary care provider.  If your systolic blood pressure (the top number) is 140 or greater or your diastolic blood pressure (the bottom number) is 90 or greater, you may have high blood pressure. High blood pressure is treatable, but if left untreated over time it can put you at risk for heart attack, stroke, or kidney failure. You should have your blood pressure rechecked by a primary care provider within the next 4 weeks.  If your provider in the emergency department today gave you specific instructions to follow-up with your doctor or provider even sooner than that, you should follow that  instruction and not wait for up to 4 weeks for your follow-up visit.        Thank you for choosing [facility]       Thank you for choosing [facility] for your care. Our goal is always to provide you with excellent care. Hearing back from our patients is one way we can continue to improve our services. Please take a few minutes to complete the written survey that you may receive in the mail after you visit with us. Thank you!        [employer] Inchart Information     [employer] gives you secure access to your electronic health record. If you see a primary care provider, you can also send messages to your care team and make appointments. If you have questions, please call your primary care clinic.  If you do not have a primary care provider, please call [phone] and they will assist you.        Care EveryWhere ID     This is your Care EveryWhere ID. This could be used by other organizations to access your [facility] medical records  [id number]        Equal Access to Services     EBONI JO : Rody Dang, oneyda shaw, angela ellis . So [facility] [phone].    ATENCIÓN: Si habla español, tiene a quintero disposición servicios gratuitos de asistencia lingüística. Llame al [phone].    We comply with applicable federal civil rights laws and [place] laws. We do not discriminate on the basis of race, color, national origin, age, disability sex, sexual orientation or gender identity.            After Visit Summary       This is your record. Keep this with you and show to your community pharmacist(s) and doctor(s) at your next visit.

## 2017-11-15 ENCOUNTER — ONCOLOGY VISIT (OUTPATIENT)
Dept: ONCOLOGY | Facility: CLINIC | Age: 42
End: 2017-11-15
Attending: CLINICAL NURSE SPECIALIST
Payer: COMMERCIAL

## 2017-11-15 VITALS
SYSTOLIC BLOOD PRESSURE: 122 MMHG | RESPIRATION RATE: 16 BRPM | WEIGHT: 199 LBS | TEMPERATURE: 99.7 F | BODY MASS INDEX: 30.16 KG/M2 | DIASTOLIC BLOOD PRESSURE: 72 MMHG | HEIGHT: 68 IN | OXYGEN SATURATION: 98 % | HEART RATE: 98 BPM

## 2017-11-15 DIAGNOSIS — Z12.39 ENCOUNTER FOR BREAST CANCER SCREENING OTHER THAN MAMMOGRAM: Primary | ICD-10-CM

## 2017-11-15 DIAGNOSIS — F41.9 ANXIETY: ICD-10-CM

## 2017-11-15 DIAGNOSIS — Z91.89 AT HIGH RISK FOR BREAST CANCER: ICD-10-CM

## 2017-11-15 DIAGNOSIS — Z80.3 FAMILY HISTORY OF MALIGNANT NEOPLASM OF BREAST: ICD-10-CM

## 2017-11-15 PROBLEM — J45.909 UNCOMPLICATED ASTHMA: Status: ACTIVE | Noted: 2017-09-20

## 2017-11-15 PROCEDURE — 99211 OFF/OP EST MAY X REQ PHY/QHP: CPT

## 2017-11-15 PROCEDURE — 99214 OFFICE O/P EST MOD 30 MIN: CPT | Performed by: CLINICAL NURSE SPECIALIST

## 2017-11-15 RX ORDER — CETIRIZINE HYDROCHLORIDE 10 MG/1
2 TABLET ORAL DAILY
COMMUNITY
End: 2017-11-21

## 2017-11-15 RX ORDER — LORAZEPAM 1 MG/1
0.5-1 TABLET ORAL EVERY 8 HOURS PRN
Qty: 2 TABLET | Refills: 0 | Status: SHIPPED | OUTPATIENT
Start: 2017-11-15 | End: 2017-11-21

## 2017-11-15 RX ORDER — ERGOCALCIFEROL 1.25 MG/1
CAPSULE, LIQUID FILLED ORAL
Refills: 0 | COMMUNITY
Start: 2017-10-21 | End: 2022-05-06

## 2017-11-15 RX ORDER — GABAPENTIN 300 MG/1
600 CAPSULE ORAL
COMMUNITY
Start: 2017-08-21

## 2017-11-15 RX ORDER — EPINEPHRINE 0.3 MG/.3ML
1 INJECTION SUBCUTANEOUS
Refills: 0 | COMMUNITY
Start: 2017-10-25

## 2017-11-15 RX ORDER — FERROUS SULFATE 325(65) MG
325 TABLET ORAL DAILY
COMMUNITY
Start: 2017-11-09 | End: 2019-04-06

## 2017-11-15 ASSESSMENT — PAIN SCALES - GENERAL: PAINLEVEL: NO PAIN (0)

## 2017-11-15 NOTE — NURSING NOTE
"Oncology Rooming Note    November 15, 2017 10:04 AM   Maritza Hanson is a 42 year old female who presents for:    Chief Complaint   Patient presents with     Family History Of Cancer     Initial Vitals: /72  Pulse 98  Temp 99.7  F (37.6  C) (Tympanic)  Resp 16  Ht 1.727 m (5' 8\")  Wt 90.3 kg (199 lb)  SpO2 98%  BMI 30.26 kg/m2 Estimated body mass index is 30.26 kg/(m^2) as calculated from the following:    Height as of this encounter: 1.727 m (5' 8\").    Weight as of this encounter: 90.3 kg (199 lb). Body surface area is 2.08 meters squared.  No Pain (0) Comment: Data Unavailable   No LMP recorded.  Allergies reviewed: Yes  Medications reviewed: Yes    Medications: Medication refills not needed today.  [facility] [address] [place] [address]  [address]    Clinical concerns: follow up     8 minutes for nursing intake (face to face time)     Lynn Dumont CMA     DISCHARGE PLAN:  Next appointments: See patient instruction section  Departure Mode: Ambulatory  Accompanied by: self  0 minutes for nursing discharge (face to face time)   Lynn Dumont CMA                  "

## 2017-11-15 NOTE — LETTER
Cancer Risk Management  Program Locations    [facility] Cancer [facility]  [facility] Cancer [facility]  Mailing Address  Cancer Risk Management Program  [facility]  [address]  [place] [address]    New patient appointments  [phone]  November 15, 2017    Maritza Hanson  [address] [address]      Dear Maritza,    It was good to see you again today.  Below is a copy of my note, outlining your plan.  Have a happy holiday season; I will let you know the results of your screenings.    Oncology Risk Management Consultation:  Date on this visit: 11/15/2017    Maritza Hanson returns to the [facility] today for a six month follow up. She requires high risk screening and surveillance for her risk of cancer secondary to having a family history of breast cancer in her mother at age 52,  maternal aunt in her 40s and paternal grandmother in her 40s.     She is considered to at high risk for breast cancer and has a 28% lifetime risk for breast cancer by the Evaristo model.    Primary Physician: Ollie Loera PA-C    History Of Present Illness:  Ms. Hanson is a very pleasant 42 year old female who presents with family history of breast and colon cancer.   Genetic Testin2015 - negative for genetic mutations in 25 genes using a [employer] panel through [employer]. Maritza was found to be negative for genetic mutations in: APC, MATTHEW, BARD1, BMPR1A, BRCA1, BRCA2, BRIP1, CDH1, CDK4, CDKN2A, CHEK2, EPCAM (large rearrangement only), MLH1, MSH2, MSH6, MUTYH, NBN, PALB2, PMS2, PTEN, RAD51C, RAD51D, SMAD4, STK11, and TP53 genes. No mutations were found in any of the 25 genes analyzed. This test involved sequencing and deletion/duplication analysis of all genes with the exception of EPCAM (deletions only).     Pertinent  history:  Menarche at age 14.  First child at age 34.   Premenopausal.  Ovaries and uterus intact.  LMP: Oct. 25, 2017  History of breastfeeding both of her sons, for 6 months and 10 months, respectively.   History of OCPs 10 years and used one dose of depo-provera; she was unable to tolerate the medication, as it heightened her anxiety.  No history of HRT  Breast imaging history:  Breast MRI and mammogram in  from [place]. No records available; she states they were normal.  2015 -Screening mammogram, BiRads 0 for asymmetry in R breast  2015 - Diagnostic mammogram, R breast - BiRads1, fibroglandular asymmetry in upper R breast  2016 - Breast MRI, BiRads1  2016 - Screening tomosynthesis mammogram, BiRads1.  Other cancer screenin2016 - Colonoscopy for family history of colon cancer in father at age 65, paternal cousin with colon cancer in 50s. And paternal uncle with 10+ colon polyps. Results: 7 hyperplastic plastic polyps removed (2 in ascending and 5 in sigmoid), and 1 tubular adenoma removed from descending colon.   At this visit, she denies fatigue, pain, asymmetry, lumps, masses, thickening, pain, nipple discharge and skin changes in her breasts.     Past Medical/Surgical History:  Past Medical History:   Diagnosis Date     Anemia during pregnancy and now     Back pain      Depression with anxiety since teenage     History of tics      Mild preeclampsia     during both pregnancy, resolved     POTS (postural orthostatic tachycardia syndrome)      Uncomplicated asthma      Past Surgical History:   Procedure Laterality Date     ENT SURGERY      wisdom teeth extraction     ORTHOPEDIC SURGERY      removal i=os sesimoid bone right foot       Allergies:  Allergies as of 11/15/2017 - Pedro as Reviewed 11/15/2017   Allergen Reaction Noted     Hydrocodone Itching 2012     Sulfa drugs Hives 2012       Current Medications:  Current Outpatient Prescriptions   Medication Sig  Dispense Refill     Esomeprazole Magnesium (NEXIUM PO)        Ferrous Sulfate (IRON SUPPLEMENT PO)        LORazepam (ATIVAN) 1 MG tablet Take 0.5-1 tablets (0.5-1 mg) by mouth every 8 hours as needed for anxiety or other (prior to breast MRI) Take 30 minutes prior to departure.  Do not operate a vehicle after taking this medication 2 tablet 0     gabapentin (NEURONTIN) 300 MG capsule Take 3 capsules by mouth       beclomethasone (QVAR) 80 MCG/ACT Inhaler Inhale 2 puffs into the lungs 2 times daily       ferrous sulfate (IRON) 325 (65 FE) MG tablet Take 325 mg by mouth daily       SERTRALINE HCL PO Take 50 mg by mouth daily        Cetirizine HCl (ZYRTEC ALLERGY PO) Take 10 mg by mouth 2 times daily       MONTELUKAST SODIUM PO Take 10 mg by mouth At Bedtime       beclomethasone (QVAR) 80 MCG/ACT Inhaler Inhale 2 puffs into the lungs 2 times daily       EPINEPHrine (EPIPEN/ADRENACLICK/OR ANY BX GENERIC EQUIV) 0.3 MG/0.3ML injection 2-pack 1 mg  0     cetirizine (ZYRTEC) 10 MG tablet Take 2 mg by mouth daily       vitamin D (ERGOCALCIFEROL) 52546 UNIT capsule TK 1 C PO 1 TIME WEEKLY  0     dexamethasone (DECADRON) 4 MG tablet Take 3 tablets (12 mg) by mouth once for 1 dose 3 tablet 0     [DISCONTINUED] gabapentin (NEURONTIN) 100 MG capsule Take 600 mg by mouth At Bedtime           Family History:  Family History   Problem Relation Age of Onset     Breast Cancer Mother 52      at 52     Thyroid Disease Mother      Psychotic Disorder Mother      Obesity Mother      Lung Cancer Maternal Grandmother       at 70     CEREBROVASCULAR DISEASE Maternal Grandfather      DIABETES Maternal Grandfather      Breast Cancer Paternal Grandmother 40     recurrence in 70s/recurrence in 70s,  at 70     DIABETES Father      Obesity Father      Psychotic Disorder Father      Hearing Loss Father      Colon Cancer Father 65     Breast Cancer Maternal Aunt 40      in 70s     Psychotic Disorder Sister      Colon Polyps Paternal  "Uncle      more than 10 colon polyps     Colon Cancer Cousin 50     maternal cousin     CANCER Paternal Aunt      unknown,  in 70s after reaction to chemo     CANCER Maternal Aunt 51     brain or bone cancer       Social History:  Social History     Social History     Marital status:      Spouse name: Sukumar     Number of children: 2     Years of education: N/A     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 No      Service No     Blood Transfusions No     Caffeine Concern No     Occupational Exposure No     Hobby Hazards No     Sleep Concern No     Stress Concern Yes     Weight Concern Yes     would like to lose weight     Special Diet Yes     elimination dairy     Back Care Yes     Exercise No     Bike Helmet Not Asked     NA, don't ride     Seat Belt Yes     Self-Exams No     Social History Narrative       Physical Exam:  /72  Pulse 98  Temp 99.7  F (37.6  C) (Tympanic)  Resp 16  Ht 1.727 m (5' 8\")  Wt 90.3 kg (199 lb)  SpO2 98%  BMI 30.26 kg/m2    GENERAL APPEARANCE: healthy, alert and no distress     NECK: no adenopathy, no asymmetry or masses     LYMPHATICS: No cervical, supraclavicular or axillary lymphadenopathy     RESP: lungs clear to auscultation - no rales, rhonchi or wheezes     CARDIOVASCULAR: regular rate and rhythm, normal S1 S2, no S3 or S4 and no murmur.   BREASTS: Examined in a sitting and lying position. Symmetrical without visible distortion, swelling or rashes. No nipple inversion, nipple discharge, breast dimpling or puckering. Breast tissue is dense to palpation.  Axillary area " without masses or lymphadenopathy.       SKIN: no suspicious lesions or rashes    Laboratory/Imaging Studies  Results for orders placed or performed during the hospital encounter of 09/28/17   Chest CT, IV contrast only - PE protocol    Narrative    CT CHEST PULMONARY EMBOLISM WITH CONTRAST September 28, 2017 1:05 PM     HISTORY: Shortness of breath.    TECHNIQUE: 70mL Isovue-370. CT images of the chest after  administration of nonionic intravenous contrast timed for maximal  opacification of the pulmonary arteries. Radiation dose for this scan  was reduced using automated exposure control, adjustment of the mA  and/or kV according to patient size, or iterative reconstruction  technique.    COMPARISON: None.    FINDINGS: No evidence of pulmonary embolism. No thoracic aortic  aneurysm or dissection. No pneumothorax. No pleural or pericardial  effusion. No pulmonary nodule or mass. No mediastinal, hilar, or  axillary adenopathy. Thyroid gland appears normal. Images through the  upper abdomen demonstrates an enlarged fatty infiltrated liver.      Impression    IMPRESSION:  1. No evidence of pulmonary embolism or other acute chest abnormality.  2. Enlarged fatty infiltrated liver.    YAJAIRA GARCIA MD       ASSESSMENT  We discussed her last screening tests and reviewed results.  She has no breast complaints at this time.     We reviewed health promotion practices in terms of exercise, maintaining a healthy weight.  Maritza would like to lose weight, but she is on several medications that contribute to weight gain. She states that she has given up sugar 7-8 times before in her life and she does well with weight loss. She has even successfully gone off all her medications in the past with such a change in her diet.   I encourage her to try this again, in a way that is sustainable for her, perhaps with the help of Weight Loss Management and/or a registered dietician, as maintaining a BMI within 19-24.5 can reduce  inflammation in the body and lower the risk for cancers.    INDIVIDUALIZED CANCER RISK MANAGEMENT PLAN:  Individualized Surveillance Plan for women  With 20% or greater lifetime risk of breast cancer   Per NCCN Breast Cancer Screening and Diagnosis Guidelines Version 1.2017    Recommended screening Test or procedure Last done Next Scheduled    Clinical encounter Ongoing risk assessment, risk reduction counseling and clinical breast exam, every 6-12 months.   11/17/2018   May 2018   However, some family histories with breast cancers at a very young age, may warrant screening starting earlier.    *May begin at age 40 if breast cancers in the family occur at later ages.    Annual mammogram beginning 10 years younger than the earliest breast cancer in the family but not prior to age 30.    Recommend annual breast MRI to begin 10 years younger than the earliest breast cancer in the family but not prior to age 25.    Breast MRIs are preferably done on day 7-15 of the menstrual cycle in premenopausal women.   11/7/2016 - Screening mammogram, BiRads1    5/31/2016 - Breast MRI, BiRads1 NEXT:  Breast MRI: soon.      Next exam: May 2018; will schedule tomosynthesis mammogram after visit   Breast screening for patients at high risk due to thoracic radiation between the ages of 10-30     Begin 8-10 years post radiation treatment or age 25.   Annual mammogram   and  Annual breast MRI, preferably on day 7-15 of menstrual cycle for premenopausal women.    Annual clinical breast exams with ongoing risk assessment and risk reduction counseling until age 25, then every 6-12 months.     NA     NA       I will follow up on her screenings and see her in the Cancer Risk Management clinic in six months.    I spent 27  minutes with the patient with greater that 50% of it in counseling and coordinating care as documented above.    JENNIFER Guerrero-CNS, OCN, ANG-BC  Clinical Nurse Specialist  Cancer Risk Management  Program  [address]  [place] [address]    phone:  [phone]  Pager: [phone]  fax: [phone]      Cc: Ollie Loera PA-C

## 2017-11-15 NOTE — PROGRESS NOTES
Oncology Risk Management Consultation:  Date on this visit: 11/15/2017    Maritza Hanson returns to the [facility] today for a six month follow up. She requires high risk screening and surveillance for her risk of cancer secondary to having a family history of breast cancer in her mother at age 52,  maternal aunt in her 40s and paternal grandmother in her 40s.     She is considered to at high risk for breast cancer and has a 28% lifetime risk for breast cancer by the Evaristo model.    Primary Physician: Ollie Loera PA-C    History Of Present Illness:  Ms. Hanson is a very pleasant 42 year old female who presents with family history of breast and colon cancer.   Genetic Testin2015 - negative for genetic mutations in 25 genes using a [employer] panel through [employer]. Maritza was found to be negative for genetic mutations in: APC, MATTHEW, BARD1, BMPR1A, BRCA1, BRCA2, BRIP1, CDH1, CDK4, CDKN2A, CHEK2, EPCAM (large rearrangement only), MLH1, MSH2, MSH6, MUTYH, NBN, PALB2, PMS2, PTEN, RAD51C, RAD51D, SMAD4, STK11, and TP53 genes. No mutations were found in any of the 25 genes analyzed. This test involved sequencing and deletion/duplication analysis of all genes with the exception of EPCAM (deletions only).     Pertinent history:  Menarche at age 14.  First child at age 34.   Premenopausal.  Ovaries and uterus intact.  LMP: Oct. 25, 2017  History of breastfeeding both of her sons, for 6 months and 10 months, respectively.   History of OCPs 10 years and used one dose of depo-provera; she was unable to tolerate the medication, as it heightened her anxiety.  No history of HRT  Breast imaging history:  Breast MRI and mammogram in  from [place]. No records available; she states they were normal.  2015 -Screening mammogram, BiRads 0 for asymmetry in R breast  2015 - Diagnostic mammogram, R breast - BiRads1, fibroglandular asymmetry in upper R breast  2016 - Breast MRI, BiRads1  2016  - Screening tomosynthesis mammogram, BiRads1.  Other cancer screenin2016 - Colonoscopy for family history of colon cancer in father at age 65, paternal cousin with colon cancer in 50s. And paternal uncle with 10+ colon polyps. Results: 7 hyperplastic plastic polyps removed (2 in ascending and 5 in sigmoid), and 1 tubular adenoma removed from descending colon.   At this visit, she denies fatigue, pain, asymmetry, lumps, masses, thickening, pain, nipple discharge and skin changes in her breasts.     Past Medical/Surgical History:  Past Medical History:   Diagnosis Date     Anemia during pregnancy and now     Back pain      Depression with anxiety since teenage     History of tics      Mild preeclampsia     during both pregnancy, resolved     POTS (postural orthostatic tachycardia syndrome)      Uncomplicated asthma      Past Surgical History:   Procedure Laterality Date     ENT SURGERY      wisdom teeth extraction     ORTHOPEDIC SURGERY      removal i=os sesimoid bone right foot       Allergies:  Allergies as of 11/15/2017 - Pedro as Reviewed 11/15/2017   Allergen Reaction Noted     Hydrocodone Itching 2012     Sulfa drugs Hives 2012       Current Medications:  Current Outpatient Prescriptions   Medication Sig Dispense Refill     Esomeprazole Magnesium (NEXIUM PO)        Ferrous Sulfate (IRON SUPPLEMENT PO)        LORazepam (ATIVAN) 1 MG tablet Take 0.5-1 tablets (0.5-1 mg) by mouth every 8 hours as needed for anxiety or other (prior to breast MRI) Take 30 minutes prior to departure.  Do not operate a vehicle after taking this medication 2 tablet 0     gabapentin (NEURONTIN) 300 MG capsule Take 3 capsules by mouth       beclomethasone (QVAR) 80 MCG/ACT Inhaler Inhale 2 puffs into the lungs 2 times daily       ferrous sulfate (IRON) 325 (65 FE) MG tablet Take 325 mg by mouth daily       SERTRALINE HCL PO Take 50 mg by mouth daily        Cetirizine HCl (ZYRTEC ALLERGY PO) Take 10 mg by mouth 2  times daily       MONTELUKAST SODIUM PO Take 10 mg by mouth At Bedtime       beclomethasone (QVAR) 80 MCG/ACT Inhaler Inhale 2 puffs into the lungs 2 times daily       EPINEPHrine (EPIPEN/ADRENACLICK/OR ANY BX GENERIC EQUIV) 0.3 MG/0.3ML injection 2-pack 1 mg  0     cetirizine (ZYRTEC) 10 MG tablet Take 2 mg by mouth daily       vitamin D (ERGOCALCIFEROL) 05340 UNIT capsule TK 1 C PO 1 TIME WEEKLY  0     dexamethasone (DECADRON) 4 MG tablet Take 3 tablets (12 mg) by mouth once for 1 dose 3 tablet 0     [DISCONTINUED] gabapentin (NEURONTIN) 100 MG capsule Take 600 mg by mouth At Bedtime           Family History:  Family History   Problem Relation Age of Onset     Breast Cancer Mother 52      at 52     Thyroid Disease Mother      Psychotic Disorder Mother      Obesity Mother      Lung Cancer Maternal Grandmother       at 70     CEREBROVASCULAR DISEASE Maternal Grandfather      DIABETES Maternal Grandfather      Breast Cancer Paternal Grandmother 40     recurrence in 70s/recurrence in 70s,  at 70     DIABETES Father      Obesity Father      Psychotic Disorder Father      Hearing Loss Father      Colon Cancer Father 65     Breast Cancer Maternal Aunt 40      in 70s     Psychotic Disorder Sister      Colon Polyps Paternal Uncle      more than 10 colon polyps     Colon Cancer Cousin 50     maternal cousin     CANCER Paternal Aunt      unknown,  in 70s after reaction to chemo     CANCER Maternal Aunt 51     brain or bone cancer       Social History:  Social History     Social History     Marital status:      Spouse name: Sukumar     Number of children: 2     Years of education: N/A     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 No      Service No     Blood Transfusions No     Caffeine Concern No     Occupational Exposure No     Hobby Hazards No     Sleep Concern No     Stress Concern Yes     Weight Concern Yes     would like to lose weight     Special Diet Yes     elimination dairy     Back Care Yes     Exercise No     Bike Helmet Not Asked     NA, don't ride     Seat Belt Yes     Self-Exams No     Social History Narrative       Physical Exam:  /72  Pulse 98  Temp 99.7  F (37.6  C) (Tympanic)  Resp 16  Ht 1.727 m (5' 8\")  Wt 90.3 kg (199 lb)  SpO2 98%  BMI 30.26 kg/m2    GENERAL APPEARANCE: healthy, alert and no distress     NECK: no adenopathy, no asymmetry or masses     LYMPHATICS: No cervical, supraclavicular or axillary lymphadenopathy     RESP: lungs clear to auscultation - no rales, rhonchi or wheezes     CARDIOVASCULAR: regular rate and rhythm, normal S1 S2, no S3 or S4 and no murmur.   BREASTS: Examined in a sitting and lying position. Symmetrical without visible distortion, swelling or rashes. No nipple inversion, nipple discharge, breast dimpling or puckering. Breast tissue is dense to palpation.  Axillary area without masses or lymphadenopathy.       SKIN: no suspicious lesions or rashes    Laboratory/Imaging Studies  Results for orders placed or performed during the hospital encounter of 09/28/17   Chest CT, IV contrast only - PE protocol    Narrative    CT CHEST PULMONARY EMBOLISM WITH CONTRAST September 28, 2017 1:05 PM     HISTORY: Shortness of breath.    TECHNIQUE: 70mL Isovue-370. CT images of the chest after  administration of nonionic intravenous contrast timed for maximal  opacification of the pulmonary arteries. Radiation dose for this scan  was reduced using automated exposure control, adjustment of the mA  and/or kV according to patient size, or iterative " reconstruction  technique.    COMPARISON: None.    FINDINGS: No evidence of pulmonary embolism. No thoracic aortic  aneurysm or dissection. No pneumothorax. No pleural or pericardial  effusion. No pulmonary nodule or mass. No mediastinal, hilar, or  axillary adenopathy. Thyroid gland appears normal. Images through the  upper abdomen demonstrates an enlarged fatty infiltrated liver.      Impression    IMPRESSION:  1. No evidence of pulmonary embolism or other acute chest abnormality.  2. Enlarged fatty infiltrated liver.    YAJAIRA GARCIA MD       ASSESSMENT  We discussed her last screening tests and reviewed results.  She has no breast complaints at this time.     We reviewed health promotion practices in terms of exercise, maintaining a healthy weight.  Maritza would like to lose weight, but she is on several medications that contribute to weight gain. She states that she has given up sugar 7-8 times before in her life and she does well with weight loss. She has even successfully gone off all her medications in the past with such a change in her diet.   I encourage her to try this again, in a way that is sustainable for her, perhaps with the help of Weight Loss Management and/or a registered dietician, as maintaining a BMI within 19-24.5 can reduce inflammation in the body and lower the risk for cancers.    INDIVIDUALIZED CANCER RISK MANAGEMENT PLAN:  Individualized Surveillance Plan for women  With 20% or greater lifetime risk of breast cancer   Per NCCN Breast Cancer Screening and Diagnosis Guidelines Version 1.2017    Recommended screening Test or procedure Last done Next Scheduled    Clinical encounter Ongoing risk assessment, risk reduction counseling and clinical breast exam, every 6-12 months.   11/17/2018   May 2018   However, some family histories with breast cancers at a very young age, may warrant screening starting earlier.    *May begin at age 40 if breast cancers in the family occur at later ages.     Annual mammogram beginning 10 years younger than the earliest breast cancer in the family but not prior to age 30.    Recommend annual breast MRI to begin 10 years younger than the earliest breast cancer in the family but not prior to age 25.    Breast MRIs are preferably done on day 7-15 of the menstrual cycle in premenopausal women.   11/7/2016 - Screening mammogram, BiRads1    5/31/2016 - Breast MRI, BiRads1 NEXT:  Breast MRI: soon.      Next exam: May 2018; will schedule tomosynthesis mammogram after visit   Breast screening for patients at high risk due to thoracic radiation between the ages of 10-30     Begin 8-10 years post radiation treatment or age 25.   Annual mammogram   and  Annual breast MRI, preferably on day 7-15 of menstrual cycle for premenopausal women.    Annual clinical breast exams with ongoing risk assessment and risk reduction counseling until age 25, then every 6-12 months.     JAKOB ROBERT       I will follow up on her screenings and see her in the Cancer Risk Management clinic in six months.    I spent 27  minutes with the patient with greater that 50% of it in counseling and coordinating care as documented above.    Elena Aranda, JENNIFER-CNS, OCN, ANG-BC  Clinical Nurse Specialist  Cancer Risk Management Program  [address]  [place] [address]    phone:  [phone]  Pager: [phone]  fax: [phone]      Cc: Ollie Loera PA-C

## 2017-11-15 NOTE — MR AVS SNAPSHOT
After Visit Summary   11/15/2017    Maritza Hanson    MRN: [id number]           Patient Information     Date Of Birth          1975        Visit Information        Provider Department      11/15/2017 10:00 AM Elena Aranda APRN [facility] Risk [facility]        Today's Diagnoses     Encounter for breast cancer screening other than mammogram    -  1    Family history of malignant neoplasm of breast        At high risk for breast cancer        Anxiety          Care Instructions    Individualized Surveillance Plan for women  With 20% or greater lifetime risk of breast cancer   Per NCCN Breast Cancer Screening and Diagnosis Guidelines Version 1.2017    Recommended screening Test or procedure Last done Next Scheduled    Clinical encounter Ongoing risk assessment, risk reduction counseling and clinical breast exam, every 6-12 months.   11/17/2018   May 2018   However, some family histories with breast cancers at a very young age, may warrant screening starting earlier.    *May begin at age 40 if breast cancers in the family occur at later ages.    Annual mammogram beginning 10 years younger than the earliest breast cancer in the family but not prior to age 30.    Recommend annual breast MRI to begin 10 years younger than the earliest breast cancer in the family but not prior to age 25.    Breast MRIs are preferably done on day 7-15 of the menstrual cycle in premenopausal women.   11/7/2016 - Screening mammogram, BiRads1    5/31/2016 - Breast MRI, BiRads1 NEXT:  Breast MRI: soon.      Next exam: May 2018; will schedule tomosynthesis mammogram after visit   Breast screening for patients at high risk due to thoracic radiation between the ages of 10-30     Begin 8-10 years post radiation treatment or age 25.   Annual mammogram   and  Annual breast MRI, preferably on day 7-15 of menstrual cycle for premenopausal women.    Annual clinical breast exams with ongoing risk assessment and  risk reduction counseling until age 25, then every 6-12 months.     NA     NA               Follow-ups after your visit        Follow-up notes from your care team     Return in about 6 months (around 5/15/2018) for Physical Exam.      Your next 10 appointments already scheduled     Nov 21, 2017 11:00 AM CST   METHACHOLINE with OSORIO PFL PENT   [facility] Pulmonary Function Testing ([facility])    [address]  [address] [facility] [address] [phone]           The Methacholine Challenge test evaluates how sensitive the airways in your lungs are.You will do a number of breathing tests called spirometry. Spirometry shows how much air you can breathe and how fast you can blow your air out. The technician will explain what you need to do during each test. A good effort during the breathing tests is important. The technician will  you during each test to help you give a good effort. Between the breathing tests you will be asked to inhale a spray (Methacholine). The spirometry results are compared before and after you inhale the spray to see what changes there are in your breathing. If you have questions during the tests, please ask the technician.  Preparation for a Methacholine Challenge Test 1) Allow about 1.5 hours for the test. 2) Do not wear perfumes or colognes the day of the test. 3) Avoid exercise and exposure to cold air two hours before the test. 4) Avoid caffeine: No coffee, tea, cola, chocolate, or medicines containing caffeine the day of the test. 5) Avoid nicotine: No smoking or smokeless tobacco within 24 hrs of the test 6) Do not take ANY inhaled medications the day of the test, except for emergency use (please call the pulmonary lab if inhalers are used so the test can be rescheduled).  The following medications could interfere with the Methacholine challenge. Please withhold the following medication for the recommended periods of time or your test will need to  "be rescheduled: (2 days) BETA BLOCKERS: (medications for heart disease and high blood pressure): Acebutolol (Sectral), Atenolol (Tenormin), Betachron (Propraolol ER), Betaxolol (Kerlone), Bisoprolol (Zebeta), Cartrteolol (Cartrol), Carvedilol (Coreg), Corzide(Bendroflumethiazide and Nadol), Esmolol, Inderal, Inderal LA, InderalXL (Propraolol, Propranolol LA, Propranolol XL) Inderide (Propranolol and HCTZ), Labetolol (Normodyne, Trandate), Lopressor, Lopressor HCT (Metoprolol, Metoprolol HCTZ), Nadolol(Corgard), Nebivolol (Bystolic), Pindolol (Visken), Penbutolol (Levatol), Tenoretic (Atenolol and Chlorthalidone), Timolide (Timololand HCTZ) Timolol (Blocadren), Toprol, Toptol XL (Metoprolol, Metoprolol XL) Trandate (Labetalol, Normodyne), Ziac (Bisoprolol and HCTZ) *** If you are unsure if your medication is a Beta Blocker please call the clinic.  (1 week)  Anticholenergics: Tiotropium (SPIRIVA), Aclidinium Bromide (Pressair) Umeclidinium Bromide (Incruse) Umeclidinium Bromide/Vilanterol (Anora Ellipta), Ipatroprium Bromide (Atrovent), Combivent Respimate (Albuterol/Atrovent) (5 days) LONG-ACTING INHALED BRONCHODILATORS: or medications that contain them: Salmeterol (Serevent), Formoterol (Foradil), Advair, Symbicort, Dulera, Breo Ellipta (Fluticisone Furoate/Vilanterol), Indacaterol (Arcapta Neohaler).   (5 Days) LEUKOTRIENE MODIFIERS: Singular (Montelukast), Accolade (Zafirlukast), Zyflo (Zileutin). (3 days) Hydroxazine (Astrax, Vistaril), Cetirizine (Zyrtec), Theophyolline (Bronkodyl, Elixophyllin, Slo-bid, Slo-Phyllin, Cade-24, Cade-Dur, Theolair, Uniphyl (24 hours) ANTIHISTAMINES: and/or medications that contain them: Including allergy, cold, sinus and over the counter sleep aids (8 hours) SHORT-ACTING INHALED BRONCHODILATORS: \"RESCUE INHALERS\" Albuterol (Proventil, Ventolin, ProAir) Maxair, Primatine Mist, Metaproterenol (Alupent), Terbutaline (Brethine), Levealbuterol (Xopenex).  DO NOT stop taking any " prescription medications without first talking to the prescribing physician. If you have questions or concerns about this test, please call the pulmonary function lab and talk with one of the Methacholine Techs at [phone].  Conditions that interfere with the test: Please call the pulmonary function lab to reschedule the Methacholine test if you have had recent: Respiratory infections (colds/flu) 2 - 6 weeks prior to test Medical treatment with a burst of prednisone (oral steroids) in the last 4 weeks Heart Attack or Stroke in the last 3 months.  Methacholine is a cholinergic agent that should not be administered to persons who are on cholinesterase inhibitors (for Myasthenia Gravis). Also epilepsy, cardiovascular disease with bradycardia (slow heart rate), vagotonia, peptic ulcer, untreated thyroid disease or urinary tract obstruction. If you have any of these conditions, call the Pulmonary Function Lab at [phone].  The effect of methacholine on pregnancy has not been tested. Methacholine testing is not done on pregnant or nursing women. If you are a female and have the potential of being pregnant, schedule the test within ten days of menses or within two weeks of a negative pregnancy test.  Possible Risk and Discomfort  The risks associated with a methacholine challenge are: coughing, wheezing, shortness of breath, dizziness and/or light headedness. There is a very rare chance of severe respiratory distress. Our staff is trained for this event and you will be given medications (Albuterol) to reverse the effects of the methacholine.  DO NOT STOP TAKING ANY PRESCRIPTION MEDICATIONS WITHOUT FIRST TALKING TO YOUR PRESCRIBING PHYSICIAN            May 16, 2018 10:00 AM CDT   Return Visit with JENNIFER Guerrero CNS   Cancer Risk Management Program ([facility])    [facility]  [address]  [facility] [address]   [phone]            May 16, 2018  "11:00 AM CDT   MA SCREENING BILATERAL W/ NIDIA with RHBCMA2   [facility] ([facility])    [address] [facility], [address]  [facility] [id number] [phone]           Three-dimensional (3D) mammograms are available at [place] locations in [place], [place], [place], [place], [facility], [place], [place], and [place]. -Health locations include [place] and [facility] in [place]. Benefits of 3D mammograms include: - Improved rate of cancer detection - Decreases your chance of having to go back for more tests, which means fewer: - \"False-positive\" results (This means that there is an abnormal area but it isn't cancer.) - Invasive testing procedures, such as a biopsy or surgery - Can provide clearer images of the breast if you have dense breast tissue. 3D mammography is an optional exam that anyone can have with a 2D mammogram. It doesn't replace or take the place of a 2D mammogram. 2D mammograms remain an effective screening test for all women.  Not all insurance companies cover the cost of a 3D mammogram. Check with your insurance.              Future tests that were ordered for you today     Open Future Orders        Priority Expected Expires Ordered    MR Breast Bilateral w/o & w Contrast Routine 11/15/2017 11/16/2018 11/15/2017            Who to contact     If you have questions or need follow up information about today's clinic visit or your schedule please contact CANCER RISK MANAGEMENT PROGRAM directly at [phone].  Normal or non-critical lab and imaging results will be communicated to you by SwitchNotehart, letter or phone within 4 business days after the clinic has received the results. If you do not hear from us within 7 days, please contact the clinic through VeraLightt or phone. If you have a critical or abnormal lab result, we will notify you by phone as soon as possible.  Submit refill requests through [email or web address] or call your pharmacy and they will " "forward the refill request to us. Please allow 3 business days for your refill to be completed.          Additional Information About Your Visit        MadeiraMadeiraharToday Tix Information     allGreenup gives you secure access to your electronic health record. If you see a primary care provider, you can also send messages to your care team and make appointments. If you have questions, please call your primary care clinic.  If you do not have a primary care provider, please call [phone] and they will assist you.        Care EveryWhere ID     This is your Care EveryWhere ID. This could be used by other organizations to access your [facility] medical records  [id number]        Your Vitals Were     Pulse Temperature Respirations Height Pulse Oximetry BMI (Body Mass Index)    98 99.7  F (37.6  C) (Tympanic) 16 1.727 m (5' 8\") 98% 30.26 kg/m2       Blood Pressure from Last 3 Encounters:   11/15/17 122/72   09/28/17 114/74   09/26/17 131/83    Weight from Last 3 Encounters:   11/15/17 90.3 kg (199 lb)   09/28/17 86.2 kg (190 lb)   09/26/17 86.2 kg (190 lb)                 Today's Medication Changes          These changes are accurate as of: 11/15/17 11:00 AM.  If you have any questions, ask your nurse or doctor.               Start taking these medicines.        Dose/Directions    LORazepam 1 MG tablet   Commonly known as:  ATIVAN   Used for:  Anxiety   Started by:  Elena Aranda APRN CNS        Dose:  0.5-1 mg   Take 0.5-1 tablets (0.5-1 mg) by mouth every 8 hours as needed for anxiety or other (prior to breast MRI) Take 30 minutes prior to departure.  Do not operate a vehicle after taking this medication   Quantity:  2 tablet   Refills:  0         These medicines have changed or have updated prescriptions.        Dose/Directions    gabapentin 300 MG capsule   Commonly known as:  NEURONTIN   This may have changed:  Another medication with the same name was removed. Continue taking this medication, and follow the directions " you see here.   Changed by:  Elena Aranda APRN CNS        Dose:  3 capsule   Take 3 capsules by mouth   Refills:  0         Stop taking these medicines if you haven't already. Please contact your care team if you have questions.     dextromethorphan 30 MG/5ML liquid   Commonly known as:  DELSYM   Stopped by:  Elena Aranda APRN CNS           PREDNISONE PO   Stopped by:  Elena Aranda APRN CNS                Where to get your medicines      Some of these will need a paper prescription and others can be bought over the counter.  Ask your nurse if you have questions.     Bring a paper prescription for each of these medications     LORazepam 1 MG tablet                Primary Care Provider Office Phone # Fax #    Ollie Loera PA-C [phone]       [facility] [address] [facility] [address]        Equal Access to Services     [facility] MedicineRHONDA : Hadii aad ku hadashcatia Soyaneli, waaxda luqadaha, qaybta kaalmada adeidaniayamarguerite, angela barba . So [facility] [phone].    ATENCIÓN: Si habla español, tiene a quintero disposición servicios gratuitos de asistencia lingüística. Llame al [phone].    We comply with applicable federal civil rights laws and [place] laws. We do not discriminate on the basis of race, color, national origin, age, disability, sex, sexual orientation, or gender identity.            Thank you!     Thank you for choosing [facility] RISK [facility]NAGEMENT [facility]GRAM  for your care. Our goal is always to provide you with excellent care. Hearing back from our patients is one way we can continue to improve our services. Please take a few minutes to complete the written survey that you may receive in the mail after your visit with us. Thank you!             Your Updated Medication List - Protect others around you: Learn how to safely use, store and throw away your medicines at www.disposemymeds.org.          This list is accurate as of: 11/15/17  11:00 AM.  Always use your most recent med list.                   Brand Name Dispense Instructions for use Diagnosis    dexamethasone 4 MG tablet    DECADRON    3 tablet    Take 3 tablets (12 mg) by mouth once for 1 dose        EPINEPHrine 0.3 MG/0.3ML injection 2-pack    EPIPEN/ADRENACLICK/or ANY BX GENERIC EQUIV     1 mg        gabapentin 300 MG capsule    NEURONTIN     Take 3 capsules by mouth        * IRON SUPPLEMENT PO           * ferrous sulfate 325 (65 FE) MG tablet    IRON     Take 325 mg by mouth daily        LORazepam 1 MG tablet    ATIVAN    2 tablet    Take 0.5-1 tablets (0.5-1 mg) by mouth every 8 hours as needed for anxiety or other (prior to breast MRI) Take 30 minutes prior to departure.  Do not operate a vehicle after taking this medication    Anxiety       MONTELUKAST SODIUM PO      Take 10 mg by mouth At Bedtime        NEXIUM PO           * QVAR 80 MCG/ACT Inhaler   Generic drug:  beclomethasone      Inhale 2 puffs into the lungs 2 times daily        * beclomethasone 80 MCG/ACT Inhaler    QVAR     Inhale 2 puffs into the lungs 2 times daily        SERTRALINE HCL PO      Take 50 mg by mouth daily        vitamin D 90350 UNIT capsule    ERGOCALCIFEROL     TK 1 C PO 1 TIME WEEKLY        * ZYRTEC ALLERGY PO      Take 10 mg by mouth 2 times daily        * cetirizine 10 MG tablet    zyrTEC     Take 2 mg by mouth daily        * Notice:  This list has 6 medication(s) that are the same as other medications prescribed for you. Read the directions carefully, and ask your doctor or other care provider to review them with you.

## 2017-11-15 NOTE — PATIENT INSTRUCTIONS
Individualized Surveillance Plan for women  With 20% or greater lifetime risk of breast cancer   Per NCCN Breast Cancer Screening and Diagnosis Guidelines Version 1.2017    Recommended screening Test or procedure Last done Next Scheduled    Clinical encounter Ongoing risk assessment, risk reduction counseling and clinical breast exam, every 6-12 months.   11/17/2018   May 2018   However, some family histories with breast cancers at a very young age, may warrant screening starting earlier.    *May begin at age 40 if breast cancers in the family occur at later ages.    Annual mammogram beginning 10 years younger than the earliest breast cancer in the family but not prior to age 30.    Recommend annual breast MRI to begin 10 years younger than the earliest breast cancer in the family but not prior to age 25.    Breast MRIs are preferably done on day 7-15 of the menstrual cycle in premenopausal women.   11/7/2016 - Screening mammogram, BiRads1    5/31/2016 - Breast MRI, BiRads1 NEXT:  Breast MRI: soon.      Next exam: May 2018; will schedule tomosynthesis mammogram after visit   Breast screening for patients at high risk due to thoracic radiation between the ages of 10-30     Begin 8-10 years post radiation treatment or age 25.   Annual mammogram   and  Annual breast MRI, preferably on day 7-15 of menstrual cycle for premenopausal women.    Annual clinical breast exams with ongoing risk assessment and risk reduction counseling until age 25, then every 6-12 months.     NA     NA

## 2017-11-19 ENCOUNTER — HEALTH MAINTENANCE LETTER (OUTPATIENT)
Age: 42
End: 2017-11-19

## 2017-11-21 ENCOUNTER — HOSPITAL ENCOUNTER (OUTPATIENT)
Dept: ULTRASOUND IMAGING | Facility: CLINIC | Age: 42
Discharge: HOME | End: 2017-11-21
Attending: PHYSICIAN ASSISTANT | Admitting: PHYSICIAN ASSISTANT
Payer: COMMERCIAL

## 2017-11-21 ENCOUNTER — HOSPITAL ENCOUNTER (EMERGENCY)
Facility: CLINIC | Age: 42
Discharge: HOME | End: 2017-11-21
Attending: EMERGENCY MEDICINE | Admitting: EMERGENCY MEDICINE
Payer: COMMERCIAL

## 2017-11-21 VITALS
OXYGEN SATURATION: 95 % | WEIGHT: 185 LBS | TEMPERATURE: 98 F | SYSTOLIC BLOOD PRESSURE: 112 MMHG | BODY MASS INDEX: 28.13 KG/M2 | RESPIRATION RATE: 18 BRPM | DIASTOLIC BLOOD PRESSURE: 69 MMHG

## 2017-11-21 DIAGNOSIS — R11.2 NON-INTRACTABLE VOMITING WITH NAUSEA, UNSPECIFIED VOMITING TYPE: ICD-10-CM

## 2017-11-21 DIAGNOSIS — E87.6 HYPOKALEMIA: ICD-10-CM

## 2017-11-21 DIAGNOSIS — R11.10 VOMITING: ICD-10-CM

## 2017-11-21 DIAGNOSIS — E86.0 DEHYDRATION: ICD-10-CM

## 2017-11-21 DIAGNOSIS — R10.84 ABDOMINAL PAIN, GENERALIZED: ICD-10-CM

## 2017-11-21 LAB
ALBUMIN SERPL-MCNC: 4.2 G/DL (ref 3.4–5)
ALP SERPL-CCNC: 65 U/L (ref 40–150)
ALT SERPL W P-5'-P-CCNC: 61 U/L (ref 0–50)
ANION GAP SERPL CALCULATED.3IONS-SCNC: 10 MMOL/L (ref 3–14)
AST SERPL W P-5'-P-CCNC: 81 U/L (ref 0–45)
BASOPHILS # BLD AUTO: 0 10E9/L (ref 0–0.2)
BASOPHILS NFR BLD AUTO: 0.5 %
BILIRUB SERPL-MCNC: 1.2 MG/DL (ref 0.2–1.3)
BUN SERPL-MCNC: 13 MG/DL (ref 7–30)
CALCIUM SERPL-MCNC: 8.9 MG/DL (ref 8.5–10.1)
CHLORIDE SERPL-SCNC: 105 MMOL/L (ref 94–109)
CO2 SERPL-SCNC: 23 MMOL/L (ref 20–32)
CREAT SERPL-MCNC: 0.81 MG/DL (ref 0.52–1.04)
DIFFERENTIAL METHOD BLD: NORMAL
EOSINOPHIL # BLD AUTO: 0.1 10E9/L (ref 0–0.7)
EOSINOPHIL NFR BLD AUTO: 1.1 %
ERYTHROCYTE [DISTWIDTH] IN BLOOD BY AUTOMATED COUNT: 12.7 % (ref 10–15)
GFR SERPL CREATININE-BSD FRML MDRD: 77 ML/MIN/1.7M2
GLUCOSE SERPL-MCNC: 105 MG/DL (ref 70–99)
HCT VFR BLD AUTO: 39.7 % (ref 35–47)
HGB BLD-MCNC: 14 G/DL (ref 11.7–15.7)
IMM GRANULOCYTES # BLD: 0 10E9/L (ref 0–0.4)
IMM GRANULOCYTES NFR BLD: 0.7 %
LIPASE SERPL-CCNC: 156 U/L (ref 73–393)
LYMPHOCYTES # BLD AUTO: 1.1 10E9/L (ref 0.8–5.3)
LYMPHOCYTES NFR BLD AUTO: 19.8 %
MCH RBC QN AUTO: 31.1 PG (ref 26.5–33)
MCHC RBC AUTO-ENTMCNC: 35.3 G/DL (ref 31.5–36.5)
MCV RBC AUTO: 88 FL (ref 78–100)
MONOCYTES # BLD AUTO: 0.4 10E9/L (ref 0–1.3)
MONOCYTES NFR BLD AUTO: 7.5 %
NEUTROPHILS # BLD AUTO: 4 10E9/L (ref 1.6–8.3)
NEUTROPHILS NFR BLD AUTO: 70.4 %
NRBC # BLD AUTO: 0 10*3/UL
NRBC BLD AUTO-RTO: 0 /100
PLATELET # BLD AUTO: 217 10E9/L (ref 150–450)
POTASSIUM SERPL-SCNC: 3 MMOL/L (ref 3.4–5.3)
PROT SERPL-MCNC: 8.2 G/DL (ref 6.8–8.8)
RBC # BLD AUTO: 4.5 10E12/L (ref 3.8–5.2)
SODIUM SERPL-SCNC: 138 MMOL/L (ref 133–144)
WBC # BLD AUTO: 5.7 10E9/L (ref 4–11)

## 2017-11-21 PROCEDURE — 25000132 ZZH RX MED GY IP 250 OP 250 PS 637: Performed by: EMERGENCY MEDICINE

## 2017-11-21 PROCEDURE — 25000128 H RX IP 250 OP 636: Performed by: EMERGENCY MEDICINE

## 2017-11-21 PROCEDURE — 80053 COMPREHEN METABOLIC PANEL: CPT | Performed by: EMERGENCY MEDICINE

## 2017-11-21 PROCEDURE — 76700 US EXAM ABDOM COMPLETE: CPT

## 2017-11-21 PROCEDURE — 99284 EMERGENCY DEPT VISIT MOD MDM: CPT | Mod: 25

## 2017-11-21 PROCEDURE — 83690 ASSAY OF LIPASE: CPT | Performed by: EMERGENCY MEDICINE

## 2017-11-21 PROCEDURE — 85025 COMPLETE CBC W/AUTO DIFF WBC: CPT | Performed by: EMERGENCY MEDICINE

## 2017-11-21 PROCEDURE — 96361 HYDRATE IV INFUSION ADD-ON: CPT

## 2017-11-21 PROCEDURE — 96374 THER/PROPH/DIAG INJ IV PUSH: CPT

## 2017-11-21 RX ORDER — POTASSIUM CHLORIDE 20MEQ/15ML
40 LIQUID (ML) ORAL ONCE
Status: COMPLETED | OUTPATIENT
Start: 2017-11-21 | End: 2017-11-21

## 2017-11-21 RX ORDER — ONDANSETRON 2 MG/ML
8 INJECTION INTRAMUSCULAR; INTRAVENOUS
Status: COMPLETED | OUTPATIENT
Start: 2017-11-21 | End: 2017-11-21

## 2017-11-21 RX ADMIN — SODIUM CHLORIDE 1000 ML: 9 INJECTION, SOLUTION INTRAVENOUS at 04:37

## 2017-11-21 RX ADMIN — SODIUM CHLORIDE 1000 ML: 9 INJECTION, SOLUTION INTRAVENOUS at 03:44

## 2017-11-21 RX ADMIN — POTASSIUM CHLORIDE 40 MEQ: 20 SOLUTION ORAL at 04:34

## 2017-11-21 RX ADMIN — ONDANSETRON 8 MG: 2 INJECTION INTRAMUSCULAR; INTRAVENOUS at 03:45

## 2017-11-21 ASSESSMENT — ENCOUNTER SYMPTOMS
FEVER: 0
NAUSEA: 1
VOMITING: 1
DIARRHEA: 0
ABDOMINAL PAIN: 1

## 2017-11-21 NOTE — ED PROVIDER NOTES
History     Chief Complaint:  Vomiting     HPI   Maritza Hanson is a 42-year-old female who presents with several days of vomiting. The patient reports that she had vomiting that started five days ago and lasted for two days. She had a one day reprieve from her symptoms, but they started again last night. The patient reports that she called her primary care physician who prescribed her Zofran for this. She took this at 2100 last night but still started vomiting again shortly after. The patient reports that she now has a sore throat as well and has been having some abdominal pain from the vomiting. She has not had any fever or diarrhea since this issue started.     Allergies:  Hydrocodone--Itching  Sulfa Drugs--Hives     Medications:    Zofran   Nexium   Gabapentin   Sertraline     Past Medical History:    Anemia  Asthma   Depression with anxiety   GERD   History of tics  H/o Mild preeclampsia   POTS  Tourette's syndrome     Past Surgical History:    Gilbert teeth extraction--1998   Right foot surgery--1994     Family History:    Breast cancer--Mother  Thyroid disease--Mother   Psychotic disorder--Mother, Father   Obesity--Mother   Diabetes--Father  Colon cancer--Father     Social History:  Marital Status:    Presents to the ED alone   Tobacco Use: Never  Alcohol Use: Rarely   PCP: Ollie Loera PA-C     Review of Systems   Constitutional: Negative for fever.   Gastrointestinal: Positive for abdominal pain, nausea and vomiting. Negative for diarrhea.   All other systems reviewed and are negative.    Physical Exam   First Vitals:  BP: 139/88  Heart Rate: 82  Temp: 98  F (36.7  C)  Resp: 18  Weight: 83.9 kg (185 lb)  SpO2: 98 %    Physical Exam  Nursing note and vitals reviewed.  Constitutional: Cooperative.   HENT:   Mouth/Throat: Mucous membranes are dry.   Cardiovascular: Normal rate, regular rhythm and normal heart sounds.  No murmur.  Pulmonary/Chest: Effort normal and breath sounds normal. No  respiratory distress. No wheezes. No rales.   Abdominal: Soft. Normal appearance and bowel sounds are normal. No distension. There is no tenderness. There is no rigidity and no guarding.   Neurological: Alert.   Skin: Skin is warm and dry. No rash noted.   Psychiatric: Normal mood and affect.      Emergency Department Course   Laboratory:  Blood:  CMP: K 3.0, Glc 105, ALT 61, AST 81, otherwise WNL (Creatinine 0.81)   CBC:  WBC 5.7, HGB 14.0, , otherwise WNL  Lipase: 156    Interventions:  (0344) Normal Saline, 1 liter, IV bolus   (0345) Zofran, 8 mg, IV injection   (0434) Potassium Chloride, 40 mEq, PO   (0437) Normal Saline, 1 liter, IV bolus    Emergency Department Course:  Nursing notes and vitals reviewed.  (0332) I entered the room with my scribe, obtained the history, and performed an exam of the patient as documented above.    A peripheral IV was established. Blood was drawn from the patient. This was sent for laboratory testing, findings above.        (4168) I went to update the patient on her lab results. The patient reports that she is feeling better. She has tolerated PO.     Findings and plan explained to the patient. Patient discharged home with instructions regarding supportive care, medications, and reasons to return. The importance of close follow-up was reviewed.     I personally reviewed the laboratory results with the patient and answered all related questions prior to discharge.      Impression & Plan    Medical Decision Making:  Maritza Hanson is a 42-year-old female who presents with acute vomiting illness. She has a non-surgical abdomen without concern for bowel obstruction or other surgical etiologies. Potassium was slightly low which was repleted. After the above interventions including 2 L of normal saline she is feeling much better. She has tolerated PO without difficulty. Plan of care is for discharge home to use the Zofran she already has there. Follow up instructions  discussed.     Diagnosis:    ICD-10-CM   1. Non-intractable vomiting with nausea, unspecified vomiting type R11.2   2. Dehydration E86.0   3. Hypokalemia E87.6     Disposition:  discharged to home        [facility] EMERGENCY DEPARTMENT    Scribe disclosure:  Sánchez PATEL, am serving as a scribe on 11/21/2017 at 3:32 AM to personally document services performed by Dr. Garcia based on my observations and the provider's statements to me.              Kayden Garcia MD  11/21/17 0518

## 2017-11-21 NOTE — ED AVS SNAPSHOT
[facility] Emergency Department    [address]    [facility] [address]    Phone:  [phone]    Fax:  [phone]                                       Maritza Hanson   MRN: [id number]    Department:  [facility] Emergency Department   Date of Visit:  11/21/2017           After Visit Summary Signature Page     I have received my discharge instructions, and my questions have been answered. I have discussed any challenges I see with this plan with the nurse or doctor.    ..........................................................................................................................................  Patient/Patient Representative Signature      ..........................................................................................................................................  Patient Representative Print Name and Relationship to Patient    ..................................................               ................................................  Date                                            Time    ..........................................................................................................................................  Reviewed by Signature/Title    ...................................................              ..............................................  Date                                                            Time

## 2017-11-21 NOTE — ED AVS SNAPSHOT
[facility] Emergency Department    [address]    [facility] [address]    Phone:  [phone]    Fax:  [phone]                                       Maritza Hanson   MRN: [id number]    Department:  [facility] Emergency Department   Date of Visit:  11/21/2017           Patient Information     Date Of Birth          1975        Your diagnoses for this visit were:     Non-intractable vomiting with nausea, unspecified vomiting type     Dehydration     Hypokalemia        You were seen by Kayden Garcia MD.      Follow-up Information     Follow up with Ollie Loera PA-C.    Specialty:  Physician Assistant    Why:  As needed    Contact information:    [facility]  [address] [facility] [address] [phone]          Discharge Instructions       Discharge Instructions  Vomiting    You have been seen today for vomiting (throwing up). This is usually caused by a virus, but some bacteria, parasites, medicines or other medical conditions can cause similar symptoms. At this time your provider does not find that your vomiting is a sign of anything dangerous or life-threatening. However, sometimes the signs of serious illness do not show up right away. If you have new or worse symptoms, you may need to be seen again in the Emergency Department or by your primary provider. Remember that serious problems like appendicitis can start as vomiting.    Generally, every Emergency Department visit should have a follow-up clinic visit with either a primary or a specialty clinic/provider. Please follow-up as instructed by your emergency provider today.    Return to the Emergency Department if:    You keep vomiting and you are not able to keep liquids down.     You feel you are getting dehydrated, such as being very thirsty, not urinating (peeing) at least every 8-12 hours, or feeling faint or lightheaded.     You develop a new fever, or your fever continues for more than 2  days.     You have abdominal (belly pain) that seems worse than cramps, is in one spot, or is getting worse over time. Appendicitis usually causes pain in the right lower abdomen (to the right and below your belly button) so watch for pain in this location.    You have blood in your vomit or stools.     You feel very weak.    You are not starting to improve within 24 hours of your visit here.     What can I do to help myself?    The most important thing to do is to drink clear liquids. If you have been vomiting a lot, it is best to have only small, frequent sips of liquids. Drinking too much at once may cause more vomiting. If you are vomiting often, you must replace minerals, sodium and potassium lost with your illness. Pedialyte  is the best available rehydration liquid but some find that it doesn t taste good so sports drinks are an alterative. You can also drink clear liquids such as water, weak tea, apple juice, and 7-Up . Avoid acid liquids (orange), caffeine (coffee) or alcohol. Do not drink milk until you no longer have diarrhea (loose stools).     After liquids are staying down, you may start eating mild foods. Soda crackers, toast, plain noodles, gelatin, applesauce and bananas are good first choices. Avoid foods that have acid, are spicy, fatty or have a lot of fiber (such as meats, coarse grains, vegetables). You may start eating these foods again in about 3 days when you are better.     Sometimes treatment includes prescription medicine to prevent nausea (sick to your stomach) and vomiting. If your provider prescribes these for you, take them as directed.     Do not take ibuprofen, naproxen, or other nonsteroidal anti-inflammatory (NSAID) medicines without checking with your healthcare provider.     If you were given a prescription for medicine here today, be sure to read all of the information (including the package insert) that comes with your prescription.  This will include important information  about the medicine, its side effects, and any warnings that you need to know about.  The pharmacist who fills the prescription can provide more information and answer questions you may have about the medicine.  If you have questions or concerns that the pharmacist cannot address, please call or return to the Emergency Department.     Remember that you can always come back to the Emergency Department if you are not able to see your regular provider in the amount of time listed above, if you get any new symptoms, or if there is anything that worries you.      Future Appointments        Provider Department Dept Phone Center    11/21/2017 11:00 AM Pulmonary Function Lab [facility] Pulmonary Function Testing [phone] [facility]    5/16/2018 10:00 AM JENNIFER Guerrero CNS Cancer Risk Management Program [phone] [facility]    5/16/2018 11:00 AM [facility] MAMMO ROOM 2 [facility] Imaging [phone] [facility]      24 Hour Appointment Hotline       To make an appointment at any [facility], call [phone] ([phone]). If you don't have a family doctor or clinic, we will help you find one. [facility] clinics are conveniently located to serve the needs of you and your family.             Review of your medicines      Our records show that you are taking the medicines listed below. If these are incorrect, please call your family doctor or clinic.        Dose / Directions Last dose taken    beclomethasone 80 MCG/ACT Inhaler   Commonly known as:  QVAR   Dose:  2 puff        Inhale 2 puffs into the lungs 2 times daily   Refills:  0        EPINEPHrine 0.3 MG/0.3ML injection 2-pack   Commonly known as:  EPIPEN/ADRENACLICK/or ANY BX GENERIC EQUIV   Dose:  1 mg        1 mg   Refills:  0        ferrous sulfate 325 (65 FE) MG tablet   Commonly known as:  IRON   Dose:  325 mg        Take 325 mg by mouth daily   Refills:  0        gabapentin 300 MG capsule   Commonly known as:  NEURONTIN    Dose:  3 capsule        Take 3 capsules by mouth   Refills:  0        MONTELUKAST SODIUM PO   Dose:  10 mg        Take 10 mg by mouth At Bedtime   Refills:  0        NEXIUM PO        Refills:  0        SERTRALINE HCL PO   Dose:  50 mg        Take 50 mg by mouth daily   Refills:  0        vitamin D 51236 UNIT capsule   Commonly known as:  ERGOCALCIFEROL        TK 1 C PO 1 TIME WEEKLY   Refills:  0        ZOFRAN PO        Refills:  0        ZYRTEC ALLERGY PO   Dose:  10 mg        Take 10 mg by mouth 2 times daily   Refills:  0                Procedures and tests performed during your visit     CBC with platelets differential    Comprehensive metabolic panel    Lipase      Orders Needing Specimen Collection     None      Pending Results     No orders found from 11/19/2017 to 11/22/2017.            Pending Culture Results     No orders found from 11/19/2017 to 11/22/2017.            Pending Results Instructions     If you had any lab results that were not finalized at the time of your Discharge, you can call the ED Lab Result RN at [phone]. You will be contacted by this team for any positive Lab results or changes in treatment. The nurses are available 7 days a week from 10A to 6:30P.  You can leave a message 24 hours per day and they will return your call.        Test Results From Your Hospital Stay        11/21/2017  4:02 AM      Component Results     Component Value Ref Range & Units Status    WBC 5.7 4.0 - 11.0 10e9/L Final    RBC Count 4.50 3.8 - 5.2 10e12/L Final    Hemoglobin 14.0 11.7 - 15.7 g/dL Final    Hematocrit 39.7 35.0 - 47.0 % Final    MCV 88 78 - 100 fl Final    MCH 31.1 26.5 - 33.0 pg Final    MCHC 35.3 31.5 - 36.5 g/dL Final    RDW 12.7 10.0 - 15.0 % Final    Platelet Count 217 150 - 450 10e9/L Final    Diff Method Automated Method  Final    % Neutrophils 70.4 % Final    % Lymphocytes 19.8 % Final    % Monocytes 7.5 % Final    % Eosinophils 1.1 % Final    % Basophils 0.5 % Final    % Immature  Granulocytes 0.7 % Final    Nucleated RBCs 0 0 /100 Final    Absolute Neutrophil 4.0 1.6 - 8.3 10e9/L Final    Absolute Lymphocytes 1.1 0.8 - 5.3 10e9/L Final    Absolute Monocytes 0.4 0.0 - 1.3 10e9/L Final    Absolute Eosinophils 0.1 0.0 - 0.7 10e9/L Final    Absolute Basophils 0.0 0.0 - 0.2 10e9/L Final    Abs Immature Granulocytes 0.0 0 - 0.4 10e9/L Final    Absolute Nucleated RBC 0.0  Final         11/21/2017  4:07 AM      Component Results     Component Value Ref Range & Units Status    Sodium 138 133 - 144 mmol/L Final    Potassium 3.0 (L) 3.4 - 5.3 mmol/L Final    Chloride 105 94 - 109 mmol/L Final    Carbon Dioxide 23 20 - 32 mmol/L Final    Anion Gap 10 3 - 14 mmol/L Final    Glucose 105 (H) 70 - 99 mg/dL Final    Urea Nitrogen 13 7 - 30 mg/dL Final    Creatinine 0.81 0.52 - 1.04 mg/dL Final    GFR Estimate 77 >60 mL/min/1.7m2 Final    Non  GFR Calc    GFR Estimate If Black >90 >60 mL/min/1.7m2 Final    African American GFR Calc    Calcium 8.9 8.5 - 10.1 mg/dL Final    Bilirubin Total 1.2 0.2 - 1.3 mg/dL Final    Albumin 4.2 3.4 - 5.0 g/dL Final    Protein Total 8.2 6.8 - 8.8 g/dL Final    Alkaline Phosphatase 65 40 - 150 U/L Final    ALT 61 (H) 0 - 50 U/L Final    AST 81 (H) 0 - 45 U/L Final         11/21/2017  4:07 AM      Component Results     Component Value Ref Range & Units Status    Lipase 156 73 - 393 U/L Final                Clinical Quality Measure: Blood Pressure Screening     Your blood pressure was checked while you were in the emergency department today. The last reading we obtained was  BP: 112/69 . Please read the guidelines below about what these numbers mean and what you should do about them.  If your systolic blood pressure (the top number) is less than 120 and your diastolic blood pressure (the bottom number) is less than 80, then your blood pressure is normal. There is nothing more that you need to do about it.  If your systolic blood pressure (the top number) is  120-139 or your diastolic blood pressure (the bottom number) is 80-89, your blood pressure may be higher than it should be. You should have your blood pressure rechecked within a year by a primary care provider.  If your systolic blood pressure (the top number) is 140 or greater or your diastolic blood pressure (the bottom number) is 90 or greater, you may have high blood pressure. High blood pressure is treatable, but if left untreated over time it can put you at risk for heart attack, stroke, or kidney failure. You should have your blood pressure rechecked by a primary care provider within the next 4 weeks.  If your provider in the emergency department today gave you specific instructions to follow-up with your doctor or provider even sooner than that, you should follow that instruction and not wait for up to 4 weeks for your follow-up visit.        Thank you for choosing [facility]       Thank you for choosing [facility] for your care. Our goal is always to provide you with excellent care. Hearing back from our patients is one way we can continue to improve our services. Please take a few minutes to complete the written survey that you may receive in the mail after you visit with us. Thank you!        [employer] Information     [employer] gives you secure access to your electronic health record. If you see a primary care provider, you can also send messages to your care team and make appointments. If you have questions, please call your primary care clinic.  If you do not have a primary care provider, please call [phone] and they will assist you.        Care EveryWhere ID     This is your Care EveryWhere ID. This could be used by other organizations to access your [facility] medical records  [id number]        Equal Access to Services     [facility] DEYSI : Rody Dang, wadonato lumerrill, qaybta kaaltwan kaminski, angela blair. [facility] [phone].    ATENCIÓN: Franco lenz  español, tiene a quintero disposición servicios gratuitos de asistencia lingüística. Yaakov al [phone].    We comply with applicable federal civil rights laws and [place] laws. We do not discriminate on the basis of race, color, national origin, age, disability, sex, sexual orientation, or gender identity.            After Visit Summary       This is your record. Keep this with you and show to your community pharmacist(s) and doctor(s) at your next visit.

## 2017-12-04 ENCOUNTER — HOSPITAL ENCOUNTER (OUTPATIENT)
Dept: MRI IMAGING | Facility: CLINIC | Age: 42
Discharge: HOME | End: 2017-12-04
Attending: CLINICAL NURSE SPECIALIST | Admitting: CLINICAL NURSE SPECIALIST
Payer: COMMERCIAL

## 2017-12-04 DIAGNOSIS — Z91.89 AT HIGH RISK FOR BREAST CANCER: ICD-10-CM

## 2017-12-04 DIAGNOSIS — Z80.3 FAMILY HISTORY OF MALIGNANT NEOPLASM OF BREAST: ICD-10-CM

## 2017-12-04 DIAGNOSIS — Z12.39 ENCOUNTER FOR BREAST CANCER SCREENING OTHER THAN MAMMOGRAM: ICD-10-CM

## 2017-12-04 PROCEDURE — A9585 GADOBUTROL INJECTION: HCPCS | Performed by: CLINICAL NURSE SPECIALIST

## 2017-12-04 PROCEDURE — 0159T MR BREAST BILATERAL W/O & W CONTRAST: CPT

## 2017-12-04 PROCEDURE — 25000128 H RX IP 250 OP 636: Performed by: CLINICAL NURSE SPECIALIST

## 2017-12-04 RX ORDER — GADOBUTROL 604.72 MG/ML
10 INJECTION INTRAVENOUS ONCE
Status: COMPLETED | OUTPATIENT
Start: 2017-12-04 | End: 2017-12-04

## 2017-12-04 RX ADMIN — GADOBUTROL 10 ML: 604.72 INJECTION INTRAVENOUS at 13:07

## 2017-12-08 DIAGNOSIS — R05.3 CHRONIC COUGH: Primary | ICD-10-CM

## 2017-12-08 LAB — PULMONARY FUNCTION TEST-FENO: <5 PPB (ref 0–40)

## 2017-12-14 ENCOUNTER — HOSPITAL ENCOUNTER (OUTPATIENT)
Dept: CT IMAGING | Facility: CLINIC | Age: 42
Discharge: HOME | End: 2017-12-14
Attending: INTERNAL MEDICINE | Admitting: INTERNAL MEDICINE
Payer: COMMERCIAL

## 2017-12-14 DIAGNOSIS — K59.00 CONSTIPATION: ICD-10-CM

## 2017-12-14 DIAGNOSIS — R19.7 DIARRHEA: ICD-10-CM

## 2017-12-14 DIAGNOSIS — R10.13 ABDOMINAL PAIN, EPIGASTRIC: ICD-10-CM

## 2017-12-14 DIAGNOSIS — K82.8 GALLBLADDER SLUDGE: ICD-10-CM

## 2017-12-14 DIAGNOSIS — R11.14 BILIOUS VOMITING: ICD-10-CM

## 2017-12-14 DIAGNOSIS — R12 HEARTBURN: ICD-10-CM

## 2017-12-14 DIAGNOSIS — R03.0 ELEVATED BLOOD PRESSURE READING WITHOUT DIAGNOSIS OF HYPERTENSION: ICD-10-CM

## 2017-12-14 PROCEDURE — 25000128 H RX IP 250 OP 636: Performed by: RADIOLOGY

## 2017-12-14 PROCEDURE — 74177 CT ABD & PELVIS W/CONTRAST: CPT

## 2017-12-14 RX ORDER — IOPAMIDOL 755 MG/ML
500 INJECTION, SOLUTION INTRAVASCULAR ONCE
Status: COMPLETED | OUTPATIENT
Start: 2017-12-14 | End: 2017-12-14

## 2017-12-14 RX ADMIN — IOPAMIDOL 93 ML: 755 INJECTION, SOLUTION INTRAVENOUS at 14:06

## 2017-12-14 RX ADMIN — SODIUM CHLORIDE 53 ML: 9 INJECTION, SOLUTION INTRAVENOUS at 14:06

## 2017-12-19 LAB
EXPTIME-%CHANGE-CHLG: 22 %
EXPTIME-CHLG: 2.91 SEC
EXPTIME-PRE: 2.37 SEC
FEF2575-%CHANGE-CHLG: -30 %
FEF2575-%PRED-CHLG: 67 %
FEF2575-%PRED-POST: 94 %
FEF2575-%PRED-PRE: 95 %
FEF2575-POST: 3.19 L/SEC
FEF2575-PRE: 3.25 L/SEC
FEF2575-PRED: 3.39 L/SEC
FEFMAX-%PRED-PRE: 97 %
FEFMAX-PRE: 7.38 L/SEC
FEFMAX-PRED: 7.58 L/SEC
FEV1-%PRED-PRE: 91 %
FEV1-PRE: 3.1 L
FEV1FEV6-PRE: 85 %
FEV1FEV6-PRED: 84 %
FEV1FVC-PRE: 85 %
FEV1FVC-PRED: 81 %
FIFMAX-%CHANGE-CHLG: 22 %
FIFMAX-CHLG: 5.2 L/SEC
FIFMAX-PRE: 4.25 L/SEC
FVC-%PRED-PRE: 87 %
FVC-PRE: 3.66 L
FVC-PRED: 4.19 L

## 2018-05-01 ENCOUNTER — OFFICE VISIT (OUTPATIENT)
Dept: FAMILY MEDICINE | Facility: CLINIC | Age: 43
End: 2018-05-01
Payer: COMMERCIAL

## 2018-05-01 VITALS
WEIGHT: 200 LBS | TEMPERATURE: 98.2 F | RESPIRATION RATE: 16 BRPM | DIASTOLIC BLOOD PRESSURE: 74 MMHG | SYSTOLIC BLOOD PRESSURE: 116 MMHG | HEART RATE: 90 BPM | OXYGEN SATURATION: 99 % | BODY MASS INDEX: 30.41 KG/M2

## 2018-05-01 DIAGNOSIS — L08.9 LOCAL INFECTION OF SKIN AND SUBCUTANEOUS TISSUE: Primary | ICD-10-CM

## 2018-05-01 PROCEDURE — 99213 OFFICE O/P EST LOW 20 MIN: CPT | Performed by: NURSE PRACTITIONER

## 2018-05-01 RX ORDER — CEPHALEXIN 500 MG/1
500 CAPSULE ORAL 3 TIMES DAILY
Qty: 21 CAPSULE | Refills: 0 | Status: SHIPPED | OUTPATIENT
Start: 2018-05-01 | End: 2018-05-08

## 2018-05-01 NOTE — PROGRESS NOTES
HPI    SUBJECTIVE:   Maritza Hanson is a 42 year old female who presents to clinic today for the following health issues:      Musculoskeletal problem/pain      Duration: started about 5 days ago. Seen on Saturday at ER    Description  Location: bump on left hand that hurts and it's swollen     Intensity: mild    Accompanying signs and symptoms: tingling, swelling and discoloration     History  Previous similar problem: no   Previous evaluation:  none    Precipitating or alleviating factors:  Trauma or overuse: no   Aggravating factors include: hurts to pick things up.     Therapies tried and outcome: nothing    Bump started five days ago.  Over the weekend was seen in the ER at [facility] and told this was a bug bite, but if it did not improve she was given the phone number for a hand surgeon.  She has not followed up with them.  Bump has progressively gotten bigger, is now tender, red and warm.  No fever, chills.       Problem list and histories reviewed & adjusted, as indicated.  Additional history: as documented    Current Outpatient Prescriptions   Medication Sig Dispense Refill     beclomethasone (QVAR) 80 MCG/ACT Inhaler Inhale 2 puffs into the lungs 2 times daily       Cetirizine HCl (ZYRTEC ALLERGY PO) Take 10 mg by mouth 2 times daily       EPINEPHrine (EPIPEN/ADRENACLICK/OR ANY BX GENERIC EQUIV) 0.3 MG/0.3ML injection 2-pack 1 mg  0     Esomeprazole Magnesium (NEXIUM PO)        ferrous sulfate (IRON) 325 (65 FE) MG tablet Take 325 mg by mouth daily       gabapentin (NEURONTIN) 300 MG capsule Take 3 capsules by mouth       MONTELUKAST SODIUM PO Take 10 mg by mouth At Bedtime       Ondansetron HCl (ZOFRAN PO)        SERTRALINE HCL PO Take 50 mg by mouth daily        vitamin D (ERGOCALCIFEROL) 12907 UNIT capsule TK 1 C PO 1 TIME WEEKLY  0     Allergies   Allergen Reactions     Hydrocodone Itching     Sulfa Drugs Hives       Reviewed and updated as needed this visit by clinical staff  Tobacco  Allergies   Problems  Med Hx  Soc Hx      Reviewed and updated as needed this visit by Provider         ROS:  Constitutional, HEENT, cardiovascular, pulmonary, gi and gu systems are negative, except as otherwise noted.    OBJECTIVE:     /74 (BP Location: Right arm, Cuff Size: Adult Regular)  Pulse 90  Temp 98.2  F (36.8  C) (Oral)  Resp 16  Wt 200 lb (90.7 kg)  SpO2 99%  BMI 30.41 kg/m2  Body mass index is 30.41 kg/(m^2).  GENERAL: healthy, alert and no distress  SKIN: L second finger over the proximal phalanx is ~1 cm area of redness, warmth, swelling, and tenderness, no drainage    Diagnostic Test Results:  none     ASSESSMENT/PLAN:   1. Local infection of skin and subcutaneous tissue  Progressively worsening; will start on antibiotics.  If no improvement or worsening symptoms return for follow up .   - cephALEXin (KEFLEX) 500 MG capsule; Take 1 capsule (500 mg) by mouth 3 times daily for 7 days  Dispense: 21 capsule; Refill: 0    Due for physical with pap.     JENNIFER Stewart [facility]      ROS      Physical Exam   Skin:

## 2018-05-01 NOTE — MR AVS SNAPSHOT
"              After Visit Summary   5/1/2018    Maritza Hanson    MRN: [id number]           Patient Information     Date Of Birth          1975        Visit Information        Provider Department      5/1/2018 3:00 PM Antonieta Talamantes APRN [facility]        Today's Diagnoses     Local infection of skin and subcutaneous tissue    -  1      Care Instructions    If any worsening symptoms or no improvement after antibiotics return for follow up.           Follow-ups after your visit        Follow-up notes from your care team     Return for due for physical and a pap.      Your next 10 appointments already scheduled     May 16, 2018 10:00 AM CDT   Return Visit with JENNIFER Guerrero CNS   Cancer Risk Management Program ([facility])    [facility]  [address]  [facility] [address]   [phone]            May 16, 2018 11:00 AM CDT   MA SCREENING BILATERAL W/ NIDIA with [id number]   [facility] Imaging ([facility])    [address], [address]  [facility] [id number] [phone]           Three-dimensional (3D) mammograms are available at [facility] locations in [facility], [place], [place], [facility][1], [place], [place], and [place]. [facility] locations include [facility] in [place]. Benefits of 3D mammograms include: - Improved rate of cancer detection - Decreases your chance of having to go back for more tests, which means fewer: - \"False-positive\" results (This means that there is an abnormal area but it isn't cancer.) - Invasive testing procedures, such as a biopsy or surgery - Can provide clearer images of the breast if you have dense breast tissue. 3D mammography is an optional exam that anyone can have with a 2D mammogram. It doesn't replace or take the place of a 2D mammogram. 2D mammograms remain an effective screening test for all women.  Not " all insurance companies cover the cost of a 3D mammogram. Check with your insurance.              Who to contact     If you have questions or need follow up information about today's clinic visit or your schedule please contact [facility] directly at [phone].  Normal or non-critical lab and imaging results will be communicated to you by Lexityhart, letter or phone within 4 business days after the clinic has received the results. If you do not hear from us within 7 days, please contact the clinic through Azend[employer] or phone. If you have a critical or abnormal lab result, we will notify you by phone as soon as possible.  Submit refill requests through [employer] or call your pharmacy and they will forward the refill request to us. Please allow 3 business days for your refill to be completed.          Additional Information About Your Visit        [employer]ram[employer]ol Information     [employer] gives you secure access to your electronic health record. If you see a primary care provider, you can also send messages to your care team and make appointments. If you have questions, please call your primary care clinic.  If you do not have a primary care provider, please call [phone] and they will assist you.        Care EveryWhere ID     This is your Care EveryWhere ID. This could be used by other organizations to access your [facility] medical records  [id number]        Your Vitals Were     Pulse Temperature Respirations Pulse Oximetry BMI (Body Mass Index)       90 98.2  F (36.8  C) (Oral) 16 99% 30.41 kg/m2        Blood Pressure from Last 3 Encounters:   05/01/18 116/74   11/21/17 112/69   11/15/17 122/72    Weight from Last 3 Encounters:   05/01/18 200 lb (90.7 kg)   11/21/17 185 lb (83.9 kg)   11/15/17 199 lb (90.3 kg)              Today, you had the following     No orders found for display         Today's Medication Changes          These changes are accurate as of 5/1/18  3:32 PM.  If you have any questions, ask  your nurse or doctor.               Start taking these medicines.        Dose/Directions    cephALEXin 500 MG capsule   Commonly known as:  KEFLEX   Used for:  Local infection of skin and subcutaneous tissue   Started by:  Antonieta Talamantes APRN CNP        Dose:  500 mg   Take 1 capsule (500 mg) by mouth 3 times daily for 7 days   Quantity:  21 capsule   Refills:  0         Stop taking these medicines if you haven't already. Please contact your care team if you have questions.     beclomethasone 80 MCG/ACT Inhaler   Commonly known as:  QVAR   Stopped by:  Antonieta Talamantes APRN CNP           SERTRALINE HCL PO   Stopped by:  Antonieta Talamantes APRN CNP           ZOFRAN PO   Stopped by:  Antonieta Talamantes APRN CNP                Where to get your medicines      These medications were sent to [facility] [address] [facility] [address]  [address], [facility] [address]     Phone:  [phone]     cephALEXin 500 MG capsule                Primary Care Provider Office Phone # Fax #    Ollie Loera PA-C [phone]       [facility] [id number] [place] [address]        Equal Access to Services     EBONI JO : Hadii adriel alcala hadasho Soomaali, waaxda luqadaha, qaybta kaalmada adeegyada, angela blair. [facility] [phone].    ATENCIÓN: Si habla español, tiene a quintero disposición servicios gratuitos de asistencia lingüística. Tiname al [phone].    We comply with applicable federal civil rights laws and [place] laws. We do not discriminate on the basis of race, color, national origin, age, disability, sex, sexual orientation, or gender identity.            Thank you!     Thank you for choosing [facility]  for your care. Our goal is always to provide you with excellent care. Hearing back from our patients is one way we can continue to improve our services. Please take  a few minutes to complete the written survey that you may receive in the mail after your visit with us. Thank you!             Your Updated Medication List - Protect others around you: Learn how to safely use, store and throw away your medicines at www.disposemymeds.org.          This list is accurate as of 5/1/18  3:32 PM.  Always use your most recent med list.                   Brand Name Dispense Instructions for use Diagnosis    cephALEXin 500 MG capsule    KEFLEX    21 capsule    Take 1 capsule (500 mg) by mouth 3 times daily for 7 days    Local infection of skin and subcutaneous tissue       EPINEPHrine 0.3 MG/0.3ML injection 2-pack    EPIPEN/ADRENACLICK/or ANY BX GENERIC EQUIV     1 mg        ferrous sulfate 325 (65 Fe) MG tablet    IRON     Take 325 mg by mouth daily        gabapentin 300 MG capsule    NEURONTIN     Take 3 capsules by mouth        MONTELUKAST SODIUM PO      Take 10 mg by mouth At Bedtime        NEXIUM PO           vitamin D 86455 UNIT capsule    ERGOCALCIFEROL     TK 1 C PO 1 TIME WEEKLY        ZYRTEC ALLERGY PO      Take 10 mg by mouth 2 times daily

## 2018-05-02 ASSESSMENT — ASTHMA QUESTIONNAIRES: ACT_TOTALSCORE: 25

## 2018-05-16 ENCOUNTER — HOSPITAL ENCOUNTER (OUTPATIENT)
Dept: MAMMOGRAPHY | Facility: CLINIC | Age: 43
Discharge: HOME | End: 2018-05-16
Attending: CLINICAL NURSE SPECIALIST | Admitting: CLINICAL NURSE SPECIALIST
Payer: COMMERCIAL

## 2018-05-16 ENCOUNTER — ONCOLOGY VISIT (OUTPATIENT)
Dept: ONCOLOGY | Facility: CLINIC | Age: 43
End: 2018-05-16
Attending: CLINICAL NURSE SPECIALIST
Payer: COMMERCIAL

## 2018-05-16 VITALS
SYSTOLIC BLOOD PRESSURE: 124 MMHG | HEART RATE: 99 BPM | OXYGEN SATURATION: 95 % | TEMPERATURE: 97.7 F | BODY MASS INDEX: 30.14 KG/M2 | DIASTOLIC BLOOD PRESSURE: 78 MMHG | WEIGHT: 198.2 LBS | RESPIRATION RATE: 18 BRPM

## 2018-05-16 DIAGNOSIS — Z12.39 ENCOUNTER FOR BREAST CANCER SCREENING OTHER THAN MAMMOGRAM: Primary | ICD-10-CM

## 2018-05-16 DIAGNOSIS — F41.9 ANXIETY: ICD-10-CM

## 2018-05-16 DIAGNOSIS — Z80.3 FAMILY HISTORY OF MALIGNANT NEOPLASM OF BREAST: ICD-10-CM

## 2018-05-16 DIAGNOSIS — Z91.89 AT HIGH RISK FOR BREAST CANCER: ICD-10-CM

## 2018-05-16 DIAGNOSIS — Z12.31 ENCOUNTER FOR SCREENING MAMMOGRAM FOR HIGH-RISK PATIENT: ICD-10-CM

## 2018-05-16 PROBLEM — R42 LIGHTHEADED: Status: ACTIVE | Noted: 2018-05-16

## 2018-05-16 PROBLEM — R10.11 RUQ PAIN: Status: ACTIVE | Noted: 2017-12-07

## 2018-05-16 PROCEDURE — G0463 HOSPITAL OUTPT CLINIC VISIT: HCPCS

## 2018-05-16 PROCEDURE — 77067 SCR MAMMO BI INCL CAD: CPT

## 2018-05-16 PROCEDURE — 99214 OFFICE O/P EST MOD 30 MIN: CPT | Performed by: CLINICAL NURSE SPECIALIST

## 2018-05-16 RX ORDER — LORAZEPAM 1 MG/1
0.5-1 TABLET ORAL EVERY 8 HOURS PRN
Qty: 2 TABLET | Refills: 0 | Status: SHIPPED | OUTPATIENT
Start: 2018-05-16 | End: 2019-04-06

## 2018-05-16 ASSESSMENT — PAIN SCALES - GENERAL: PAINLEVEL: NO PAIN (0)

## 2018-05-16 NOTE — PROGRESS NOTES
Oncology Risk Management Consultation:  Date on this visit: 2018    Maritza Hanson  returns to the clinic today for a six month follow up. She requires high risk screening and surveillance for her risk of cancer secondary to having a family history of breast cancer in her mother at age 52,  maternal aunt in her 40s and paternal grandmother in her 40s.     Primary Physician: Ollie Loera PA-C    History Of Present Illness:  Ms. Hanson is a very pleasant, healthy 42 year old female who presents with family history of breast cancer.  Genetic Testin2015 - negative for genetic mutations in 25 genes using a [employer] panel through [employer] (formerly known as [employer]). Maritza was found to be negative for genetic mutations in: APC, MATTHEW, BARD1, BMPR1A, BRCA1, BRCA2, BRIP1, CDH1, CDK4, CDKN2A, CHEK2, EPCAM (large rearrangement only), MLH1, MSH2, MSH6, MUTYH, NBN, PALB2, PMS2, PTEN, RAD51C, RAD51D, SMAD4, STK11, and TP53 genes. No mutations were found in any of the 25 genes analyzed. This test involved sequencing and deletion/duplication analysis of all genes with the exception of EPCAM (deletions only).     Pertinent history:  Menarche at age 14.  First child at age 34.   Premenopausal.  Ovaries and uterus intact.  LMP: ; cycles are 17-26 days apart, changing from 30 days apart over the last few months.   History of breastfeeding both of her sons, for 6 months and 10 months, respectively.   History of OCPs 10 years and used one dose of Depo-Provera; she was unable to tolerate the medication, as it heightened her anxiety.  No history of HRT    Breast imaging history:  Breast MRI and mammogram in  from [place]. No records available; she states they were normal.  2015 -Screening mammogram, BiRads 0 for asymmetry in R breast  2015 - Diagnostic mammogram, R breast - BiRads1, fibroglandular asymmetry in upper R breast  2016 - Breast MRI, BiRads1  2016 - Screening tomosynthesis mammogram,  BiRads1.  2017 - Breast MRI, BiRads1    Other cancer screenin2016 - Colonoscopy for family history of colon cancer in father at age 65, paternal cousin with colon cancer in 50s. And paternal uncle with 10+ colon polyps. Results: 7 hyperplastic plastic polyps removed (2 in ascending and 5 in sigmoid), and 1 tubular adenoma removed from descending colon.   At this visit, she denies new fatigue, breast pain, asymmetry, lumps, masses, thickening, pain, nipple discharge and skin changes in her breasts.      Past Medical/Surgical History:  Past Medical History:   Diagnosis Date     Anemia during pregnancy and now     Asthma      Back pain      Depression with anxiety since teenage     History of tics      Mild preeclampsia     during both pregnancy, resolved     POTS (postural orthostatic tachycardia syndrome)      Past Surgical History:   Procedure Laterality Date     removal of sesimoid bone right foot        wisdom teeth extraction         Allergies:  Allergies as of 2018 - Pedro as Reviewed 2018   Allergen Reaction Noted     Hydrocodone Itching 2012     Sulfa drugs Hives 2012       Current Medications:  Current Outpatient Prescriptions   Medication Sig Dispense Refill     Cetirizine HCl (ZYRTEC ALLERGY PO) Take 10 mg by mouth 2 times daily       EPINEPHrine (EPIPEN/ADRENACLICK/OR ANY BX GENERIC EQUIV) 0.3 MG/0.3ML injection 2-pack 1 mg  0     Esomeprazole Magnesium (NEXIUM PO)        ferrous sulfate (IRON) 325 (65 FE) MG tablet Take 325 mg by mouth daily       gabapentin (NEURONTIN) 300 MG capsule Take 3 capsules by mouth       MONTELUKAST SODIUM PO Take 10 mg by mouth At Bedtime       vitamin D (ERGOCALCIFEROL) 23916 UNIT capsule TK 1 C PO 1 TIME WEEKLY  0        Family History:  Family History   Problem Relation Age of Onset     Breast Cancer Mother 52      at 52     Thyroid Disease Mother      Psychotic Disorder Mother      Obesity Mother      Lung Cancer Maternal  Grandmother       at 70     CEREBROVASCULAR DISEASE Maternal Grandfather      DIABETES Maternal Grandfather      Breast Cancer Paternal Grandmother 40     recurrence in 70s/recurrence in 70s,  at 70     DIABETES Father      Obesity Father      Psychotic Disorder Father      Hearing Loss Father      Colon Cancer Father 65     Breast Cancer Maternal Aunt 40      in 70s     Psychotic Disorder Sister      Colon Polyps Paternal Uncle      more than 10 colon polyps     Colon Cancer Cousin 50     maternal cousin     CANCER Paternal Aunt      unknown,  in 70s after reaction to chemo     CANCER Maternal Aunt 51     brain or bone cancer       Social History:  Social History     Social History     Marital status:      Spouse name: Sukumar     Number of children: 2     Years of education: N/A     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 No      Service No     Blood Transfusions No     Caffeine Concern No     Occupational Exposure No     Hobby Hazards No     Sleep Concern No     Stress Concern Yes     Weight Concern Yes     would like to lose weight     Special Diet Yes     elimination dairy     Back Care Yes     Exercise No     Bike Helmet Not Asked     NA, don't ride     Seat Belt Yes     Self-Exams No     Social History Narrative       Physical Exam:  There were no vitals taken for this visit.    GENERAL APPEARANCE: healthy, alert and no distress     NECK: no adenopathy, no asymmetry or masses     LYMPHATICS: No cervical, supraclavicular or axillary lymphadenopathy     RESP: lungs clear to auscultation - no rales,  rhonchi or wheezes     CARDIOVASCULAR: regular rate and rhythm, normal S1 S2, no S3 or S4 and no murmur.   BREAST: A multi positional, bilateral breast exam was performed.  Very symmetrical breasts with no visible distortion. Right breast: no palpable dominant masses, no nipple discharge, no skin changes. Dense tissue.  Right axilla: no palpable adenopathy. Left breast: no palpable dominant masses, no nipple discharge, no skin changes. Left axilla: no palpable adenopathy. Dense tissue.    SKIN: no suspicious lesions or rashes on upper extremities, face or anterior torso.    Laboratory/Imaging Studies  Results for orders placed or performed during the hospital encounter of 12/14/17   CT Enterography    Narrative    CT ENTEROGRAPHY   12/14/2017 2:16 PM     HISTORY: Bilious vomiting. Abdominal pain, epigastric. Heartburn.  Constipation. Diarrhea. Gallbladder sludge. Elevated blood pressure  reading without diagnosis of hypertension.    TECHNIQUE:  CT abdomen and pelvis with 93 mL Isovue-370 IV.  Enterography protocol. Radiation dose for this scan was reduced using  automated exposure control, adjustment of the mA and/or kV according  to patient size, or iterative reconstruction technique.    COMPARISON: None.    FINDINGS: No acute inflammatory change of the large or small bowel. No  bowel obstruction, fistula, or abscess. The terminal ileum shows no  acute inflammation. No visualized stricture or free fluid.    Small functional cyst at the left ovary. Diffuse fatty infiltration of  the liver. Gallbladder, adrenals, spleen, pancreas, and kidneys show  no significant abnormalities.      Impression    IMPRESSION:  1. No active inflammatory bowel disease or complication identified.  2. Fatty liver.    SHARMIN DAUGHERTY MD       ASSESSMENT  We discussed her interval history. Her exam today was unremarkable; there are no changes to her family history.  She  Recently had her gallbladder removed in December and has healed well.  She  has no breast concerns today. She feels she may be perimenopausal because her menstrual cycles are varying over the last few months.      We reviewed her health promotion habits and the American Cancer Society's recommendations for reducing one's risk for cancer:    1 Limit alcohol consumption to less than 1 drink per day (1 drink=5 oz.wine, 12 oz. Beer or 1.5 oz. 80-proof liquor).  2. Exercise per American Cancer Society guidelines of at least 150 minutes of moderate-intensity activity or 75 minutes of vigorous activity each week. (Or a combination of both.) Exercise should be spread  out over the week.  3. Maintain a healthy weight with a Body Mass Index between 19-24.9.  4. Do not use tobacco products and limit exposure to passive smoke.      INDIVIDUALIZED CANCER RISK MANAGEMENT PLAN:  Individualized Surveillance Plan for women  With 20% or greater lifetime risk of breast cancer   Per NCCN Breast Cancer Screening and Diagnosis Guidelines Version 1.2017    Recommended screening Test or procedure Last done Next Scheduled    Clinical encounter Ongoing risk assessment, risk reduction counseling and clinical breast exam, every 6-12 months.   5/16/2018    May 2019   However, some family histories with breast cancers at a very young age, may warrant screening starting earlier.    *May begin at age 40 if breast cancers in the family occur at later ages.    Annual mammogram beginning 10 years younger than the earliest breast cancer in the family but not prior to age 30.    Recommend annual breast MRI to begin 10 years younger than the earliest breast cancer in the family but not prior to age 25.    Breast MRIs are preferably done on day 7-15 of the menstrual cycle in premenopausal women.   11/7/2016 - Tomosynthesis mammogram, BiRads1    12/4/2017 - MRI, BiRads1 Next: Tomosynthesis mammogram at 11:00 today    Next breast MRI: 12/5 or later    Next exam: May 2019 followed by mammogram   Breast screening for patients at  high risk due to thoracic radiation between the ages of 10-30     Begin 8-10 years post radiation treatment or age 25.   Annual mammogram   and  Annual breast MRI, preferably on day 7-15 of menstrual cycle for premenopausal women.    Annual clinical breast exams with ongoing risk assessment and risk reduction counseling until age 25, then every 6-12 months.     NA     NA     I will follow up on her screenings and see her in the Cancer Risk Management clinic in one year.    I spent 25 minutes with the patient with greater that 50% of it in counseling and coordinating care as documented above.    Elena Aranda, APRN-CNS, OCN, ANG-BC  Clinical Nurse Specialist  Cancer Risk Management Program  [address]  [place] [address]    phone:  [phone]  Pager: [phone]  fax: [phone]    Cc: Ollie Loera PA-C

## 2018-05-16 NOTE — LETTER
Cancer Risk Management  Program Locations    [facility]  [facility]  [facility]  [facility]  Mailing Address  Cancer Risk Management Program  [facility]  [address]  [place] [address]    New patient appointments  [phone]  May 16, 2018    Maritza Hanson  [address] [id number]      Dear Maritza,    It was a pleasure meeting with you today.  Below is a copy of my note from our visit, outlining your surveillance plan.      I look forward to seeing you in the future to coordinate your care and reduce your health risks. Please feel free to contact me if you have any questions or concerns.      Oncology Risk Management Consultation:  Date on this visit: 2018    Maritza Hanson  returns to the clinic today for a six month follow up. She requires high risk screening and surveillance for her risk of cancer secondary to having a family history of breast cancer in her mother at age 52,  maternal aunt in her 40s and paternal grandmother in her 40s.     Primary Physician: Ollie Loera PA-C    History Of Present Illness:  Ms. Hanson is a very pleasant, healthy 42 year old female who presents with family history of breast cancer.  Genetic Testin2015 - negative for genetic mutations in 25 genes using a [employer] panel through [facility] Maritza was found to be negative for genetic mutations in: APC, MATTHEW, BARD1, BMPR1A, BRCA1, BRCA2, BRIP1, CDH1, CDK4, CDKN2A, CHEK2, EPCAM (large rearrangement only), MLH1, MSH2, MSH6, MUTYH, NBN, PALB2, PMS2, PTEN, RAD51C, RAD51D, SMAD4, STK11, and TP53 genes. No mutations were found in any of the 25 genes analyzed. This test involved sequencing and deletion/duplication analysis of all genes with the exception of EPCAM (deletions only).     Pertinent  history:  Menarche at age 14.  First child at age 34.   Premenopausal.  Ovaries and uterus intact.  LMP: ; cycles are 17-26 days apart, changing from 30 days apart over the last few months.   History of breastfeeding both of her sons, for 6 months and 10 months, respectively.   History of OCPs 10 years and used one dose of Depo-Provera; she was unable to tolerate the medication, as it heightened her anxiety.  No history of HRT    Breast imaging history:  Breast MRI and mammogram in  from [place]. No records available; she states they were normal.  2015 -Screening mammogram, BiRads 0 for asymmetry in R breast  2015 - Diagnostic mammogram, R breast - BiRads1, fibroglandular asymmetry in upper R breast  2016 - Breast MRI, BiRads1  2016 - Screening tomosynthesis mammogram, BiRads1.  2017 - Breast MRI, BiRads1    Other cancer screenin2016 - Colonoscopy for family history of colon cancer in father at age 65, paternal cousin with colon cancer in 50s. And paternal uncle with 10+ colon polyps. Results: 7 hyperplastic plastic polyps removed (2 in ascending and 5 in sigmoid), and 1 tubular adenoma removed from descending colon.   At this visit, she denies new fatigue, breast pain, asymmetry, lumps, masses, thickening, pain, nipple discharge and skin changes in her breasts.      Past Medical/Surgical History:  Past Medical History:   Diagnosis Date     Anemia during pregnancy and now     Asthma      Back pain      Depression with anxiety since teenage     History of tics      Mild preeclampsia     during both pregnancy, resolved     POTS (postural orthostatic tachycardia syndrome)      Past Surgical History:   Procedure Laterality Date     removal of sesimoid bone right foot   1994     wisdom teeth extraction         Allergies:  Allergies as of 2018 - Pedro as Reviewed 2018   Allergen Reaction Noted     Hydrocodone Itching 2012     Sulfa drugs Hives 2012        Current Medications:  Current Outpatient Prescriptions   Medication Sig Dispense Refill     Cetirizine HCl (ZYRTEC ALLERGY PO) Take 10 mg by mouth 2 times daily       EPINEPHrine (EPIPEN/ADRENACLICK/OR ANY BX GENERIC EQUIV) 0.3 MG/0.3ML injection 2-pack 1 mg  0     Esomeprazole Magnesium (NEXIUM PO)        ferrous sulfate (IRON) 325 (65 FE) MG tablet Take 325 mg by mouth daily       gabapentin (NEURONTIN) 300 MG capsule Take 3 capsules by mouth       MONTELUKAST SODIUM PO Take 10 mg by mouth At Bedtime       vitamin D (ERGOCALCIFEROL) 36854 UNIT capsule TK 1 C PO 1 TIME WEEKLY  0        Family History:  Family History   Problem Relation Age of Onset     Breast Cancer Mother 52      at 52     Thyroid Disease Mother      Psychotic Disorder Mother      Obesity Mother      Lung Cancer Maternal Grandmother       at 70     CEREBROVASCULAR DISEASE Maternal Grandfather      DIABETES Maternal Grandfather      Breast Cancer Paternal Grandmother 40     recurrence in 70s/recurrence in 70s,  at 70     DIABETES Father      Obesity Father      Psychotic Disorder Father      Hearing Loss Father      Colon Cancer Father 65     Breast Cancer Maternal Aunt 40      in 70s     Psychotic Disorder Sister      Colon Polyps Paternal Uncle      more than 10 colon polyps     Colon Cancer Cousin 50     maternal cousin     CANCER Paternal Aunt      unknown,  in 70s after reaction to chemo     CANCER Maternal Aunt 51     brain or bone cancer       Social History:  Social History     Social History     Marital status:      Spouse name: Sukumar     Number of children: 2     Years of education: N/A     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 No      Service No     Blood Transfusions No     Caffeine Concern No     Occupational Exposure No     Hobby Hazards No     Sleep Concern No     Stress Concern Yes     Weight Concern Yes     would like to lose weight     Special Diet Yes     elimination dairy     Back Care Yes     Exercise No     Bike Helmet Not Asked     NA, don't ride     Seat Belt Yes     Self-Exams No     Social History Narrative       Physical Exam:  There were no vitals taken for this visit.    GENERAL APPEARANCE: healthy, alert and no distress     NECK: no adenopathy, no asymmetry or masses     LYMPHATICS: No cervical, supraclavicular or axillary lymphadenopathy     RESP: lungs clear to auscultation - no rales, rhonchi or wheezes     CARDIOVASCULAR: regular rate and rhythm, normal S1 S2, no S3 or S4 and no murmur.   BREAST: A multi positional, bilateral breast exam was performed.  Very symmetrical breasts with no visible distortion. Right breast: no palpable dominant masses, no nipple discharge, no skin changes. Dense tissue.  Right axilla: no palpable adenopathy. Left breast: no palpable dominant masses, no nipple discharge, no skin changes. Left axilla: no palpable adenopathy. Dense tissue.    SKIN: no suspicious lesions or rashes on upper extremities, face or anterior torso.    Laboratory/Imaging Studies  Results for orders placed or performed during the hospital encounter of 12/14/17   CT Enterography    Narrative    CT ENTEROGRAPHY   12/14/2017 2:16 PM     HISTORY: Bilious vomiting. Abdominal pain, epigastric. Heartburn.  Constipation. Diarrhea. Gallbladder sludge. Elevated blood pressure  reading without diagnosis of hypertension.    TECHNIQUE:  CT abdomen and pelvis with 93 mL Isovue-370 IV.  Enterography protocol. Radiation dose for this scan was reduced using  automated exposure control, adjustment of the mA  and/or kV according  to patient size, or iterative reconstruction technique.    COMPARISON: None.    FINDINGS: No acute inflammatory change of the large or small bowel. No  bowel obstruction, fistula, or abscess. The terminal ileum shows no  acute inflammation. No visualized stricture or free fluid.    Small functional cyst at the left ovary. Diffuse fatty infiltration of  the liver. Gallbladder, adrenals, spleen, pancreas, and kidneys show  no significant abnormalities.      Impression    IMPRESSION:  1. No active inflammatory bowel disease or complication identified.  2. Fatty liver.    SHARMIN DAUGHERTY MD       ASSESSMENT  We discussed her interval history. Her exam today was unremarkable; there are no changes to her family history.  She  Recently had her gallbladder removed in December and has healed well.  She has no breast concerns today. She feels she may be perimenopausal because her menstrual cycles are varying over the last few months.      We reviewed her health promotion habits and the American Cancer Society's recommendations for reducing one's risk for cancer:    1 Limit alcohol consumption to less than 1 drink per day (1 drink=5 oz.wine, 12 oz. Beer or 1.5 oz. 80-proof liquor).  2. Exercise per American Cancer Society guidelines of at least 150 minutes of moderate-intensity activity or 75 minutes of vigorous activity each week. (Or a combination of both.) Exercise should be spread  out over the week.  3. Maintain a healthy weight with a Body Mass Index between 19-24.9.  4. Do not use tobacco products and limit exposure to passive smoke.      INDIVIDUALIZED CANCER RISK MANAGEMENT PLAN:  Individualized Surveillance Plan for women  With 20% or greater lifetime risk of breast cancer   Per NCCN Breast Cancer Screening and Diagnosis Guidelines Version 1.2017    Recommended screening Test or procedure Last done Next Scheduled    Clinical encounter Ongoing risk assessment, risk reduction counseling and clinical  breast exam, every 6-12 months.   5/16/2018    May 2019   However, some family histories with breast cancers at a very young age, may warrant screening starting earlier.    *May begin at age 40 if breast cancers in the family occur at later ages.    Annual mammogram beginning 10 years younger than the earliest breast cancer in the family but not prior to age 30.    Recommend annual breast MRI to begin 10 years younger than the earliest breast cancer in the family but not prior to age 25.    Breast MRIs are preferably done on day 7-15 of the menstrual cycle in premenopausal women.   11/7/2016 - Tomosynthesis mammogram, BiRads1    12/4/2017 - MRI, BiRads1 Next: Tomosynthesis mammogram at 11:00 today    Next breast MRI: 12/5 or later    Next exam: May 2019 followed by mammogram   Breast screening for patients at high risk due to thoracic radiation between the ages of 10-30     Begin 8-10 years post radiation treatment or age 25.   Annual mammogram   and  Annual breast MRI, preferably on day 7-15 of menstrual cycle for premenopausal women.    Annual clinical breast exams with ongoing risk assessment and risk reduction counseling until age 25, then every 6-12 months.     NA     NA     I will follow up on her screenings and see her in the Cancer Risk Management clinic in one year.    I spent 25 minutes with the patient with greater that 50% of it in counseling and coordinating care as documented above.    JENNIFER Guerrero-CNS, OCN, ANG-BC  Clinical Nurse Specialist  Cancer Risk Management Program  [address]  [place] [address]    phone:  [phone]  Pager: [phone]  fax: [phone]    Cc: Ollie Loera PA-C

## 2018-05-16 NOTE — PATIENT INSTRUCTIONS
Individualized Surveillance Plan for women  With 20% or greater lifetime risk of breast cancer   Per NCCN Breast Cancer Screening and Diagnosis Guidelines Version 1.2017    Recommended screening Test or procedure Last done Next Scheduled    Clinical encounter Ongoing risk assessment, risk reduction counseling and clinical breast exam, every 6-12 months.   5/16/2018    May 2019   However, some family histories with breast cancers at a very young age, may warrant screening starting earlier.    *May begin at age 40 if breast cancers in the family occur at later ages.    Annual mammogram beginning 10 years younger than the earliest breast cancer in the family but not prior to age 30.    Recommend annual breast MRI to begin 10 years younger than the earliest breast cancer in the family but not prior to age 25.    Breast MRIs are preferably done on day 7-15 of the menstrual cycle in premenopausal women.   11/7/2016 - Tomosynthesis mammogram, BiRads1    12/4/2017 - MRI, BiRads1 Next: Tomosynthesis mammogram at 11:00 today    Next breast MRI: 12/5 or later    Next exam: May 2019 followed by mammogram   Breast screening for patients at high risk due to thoracic radiation between the ages of 10-30     Begin 8-10 years post radiation treatment or age 25.   Annual mammogram   and  Annual breast MRI, preferably on day 7-15 of menstrual cycle for premenopausal women.    Annual clinical breast exams with ongoing risk assessment and risk reduction counseling until age 25, then every 6-12 months.     NA     NA

## 2018-05-16 NOTE — MR AVS SNAPSHOT
After Visit Summary   5/16/2018    Maritza Hanson    MRN: [id number]           Patient Information     Date Of Birth          1975        Visit Information        Provider Department      5/16/2018 10:00 AM Elena Aranda APRN [facility]        Today's Diagnoses     Encounter for breast cancer screening other than mammogram    -  1    At high risk for breast cancer        Encounter for screening mammogram for high-risk patient        Anxiety          Care Instructions    Individualized Surveillance Plan for women  With 20% or greater lifetime risk of breast cancer   Per NCCN Breast Cancer Screening and Diagnosis Guidelines Version 1.2017    Recommended screening Test or procedure Last done Next Scheduled    Clinical encounter Ongoing risk assessment, risk reduction counseling and clinical breast exam, every 6-12 months.   5/16/2018    May 2019   However, some family histories with breast cancers at a very young age, may warrant screening starting earlier.    *May begin at age 40 if breast cancers in the family occur at later ages.    Annual mammogram beginning 10 years younger than the earliest breast cancer in the family but not prior to age 30.    Recommend annual breast MRI to begin 10 years younger than the earliest breast cancer in the family but not prior to age 25.    Breast MRIs are preferably done on day 7-15 of the menstrual cycle in premenopausal women.   11/7/2016 - Tomosynthesis mammogram, BiRads1    12/4/2017 - MRI, BiRads1 Next: Tomosynthesis mammogram at 11:00 today    Next breast MRI: 12/5 or later    Next exam: May 2019 followed by mammogram   Breast screening for patients at high risk due to thoracic radiation between the ages of 10-30     Begin 8-10 years post radiation treatment or age 25.   Annual mammogram   and  Annual breast MRI, preferably on day 7-15 of menstrual cycle for premenopausal women.    Annual clinical breast exams with  ongoing risk assessment and risk reduction counseling until age 25, then every 6-12 months.     NA     NA               Follow-ups after your visit        Follow-up notes from your care team     Return in about 1 year (around 5/16/2019) for Physical Exam.      Your next 10 appointments already scheduled     May 15, 2019 10:00 AM CDT   Return Visit with JENNIFER Guerrero CNS   Cancer Risk Management Program ([facility])    [facility]  [address]  [facility] [id number] [phone]              Future tests that were ordered for you today     Open Future Orders        Priority Expected Expires Ordered    MA Screen Bilateral w/Baron Routine 5/16/2019 5/17/2019 5/16/2018    MR Breast Bilateral w/o & w Contrast Routine 12/5/2018 5/17/2019 5/16/2018            Who to contact     If you have questions or need follow up information about today's clinic visit or your schedule please contact [facility] directly at [phone].  Normal or non-critical lab and imaging results will be communicated to you by Current Mediahart, letter or phone within 4 business days after the clinic has received the results. If you do not hear from us within 7 days, please contact the clinic through Mobile Sorcer[employer] or phone. If you have a critical or abnormal lab result, we will notify you by phone as soon as possible.  Submit refill requests through Boxee or call your pharmacy and they will forward the refill request to us. Please allow 3 business days for your refill to be completed.          Additional Information About Your Visit        Boxee Information     Boxee gives you secure access to your electronic health record. If you see a primary care provider, you can also send messages to your care team and make appointments. If you have questions, please call your primary care clinic.  If you do not have a primary care provider, please call [phone] and they will  assist you.        Care EveryWhere ID     This is your Care EveryWhere ID. This could be used by other organizations to access your [place] medical records  [id number]        Your Vitals Were     Pulse Temperature Respirations Last Period Pulse Oximetry BMI (Body Mass Index)    99 97.7  F (36.5  C) (Tympanic) 18 05/14/2018 (Approximate) 95% 30.14 kg/m2       Blood Pressure from Last 3 Encounters:   05/16/18 124/78   05/01/18 116/74   11/21/17 112/69    Weight from Last 3 Encounters:   05/16/18 89.9 kg (198 lb 3.2 oz)   05/01/18 90.7 kg (200 lb)   11/21/17 83.9 kg (185 lb)                 Today's Medication Changes          These changes are accurate as of 5/16/18  2:49 PM.  If you have any questions, ask your nurse or doctor.               Start taking these medicines.        Dose/Directions    LORazepam 1 MG tablet   Commonly known as:  ATIVAN   Used for:  Anxiety   Started by:  Elena Aranda APRN [facility]        Dose:  0.5-1 mg   Take 0.5-1 tablets (0.5-1 mg) by mouth every 8 hours as needed for anxiety or other (prior to breast MRI) Take 30 minutes prior to departure.  Do not operate a vehicle after taking this medication   Quantity:  2 tablet   Refills:  0            Where to get your medicines      Some of these will need a paper prescription and others can be bought over the counter.  Ask your nurse if you have questions.     Bring a paper prescription for each of these medications     LORazepam 1 MG tablet                Primary Care Provider Office Phone # Fax #    Ollie Loera PA-C [phone]       [facility] [address] [facility] [address]        Equal Access to Services     MICHELLE JO AH: Hadii adriel Dang, waabrahamda lumerrill, qaybta kaalmada gordy, angela blair. So [facility] [phone].    ATENCIÓN: Si habla español, tiene a quintero disposición servicios gratuitos de asistencia lingüística. Llame al [phone].    We comply with  applicable federal civil rights laws and [place] laws. We do not discriminate on the basis of race, color, national origin, age, disability, sex, sexual orientation, or gender identity.            Thank you!     Thank you for choosing [facility]  for your care. Our goal is always to provide you with excellent care. Hearing back from our patients is one way we can continue to improve our services. Please take a few minutes to complete the written survey that you may receive in the mail after your visit with us. Thank you!             Your Updated Medication List - Protect others around you: Learn how to safely use, store and throw away your medicines at www.disposemymeds.org.          This list is accurate as of 5/16/18  2:49 PM.  Always use your most recent med list.                   Brand Name Dispense Instructions for use Diagnosis    EPINEPHrine 0.3 MG/0.3ML injection 2-pack    EPIPEN/ADRENACLICK/or ANY BX GENERIC EQUIV     1 mg        ferrous sulfate 325 (65 Fe) MG tablet    IRON     Take 325 mg by mouth daily        gabapentin 300 MG capsule    NEURONTIN     Take 3 capsules by mouth        LORazepam 1 MG tablet    ATIVAN    2 tablet    Take 0.5-1 tablets (0.5-1 mg) by mouth every 8 hours as needed for anxiety or other (prior to breast MRI) Take 30 minutes prior to departure.  Do not operate a vehicle after taking this medication    Anxiety       MONTELUKAST SODIUM PO      Take 10 mg by mouth At Bedtime        NEXIUM PO           vitamin D 78996 UNIT capsule    ERGOCALCIFEROL     TK 1 C PO 1 TIME WEEKLY        ZYRTEC ALLERGY PO      Take 10 mg by mouth 2 times daily

## 2018-05-16 NOTE — NURSING NOTE
"Oncology Rooming Note    May 16, 2018 10:02 AM   Maritza Hanson is a 42 year old female who presents for:    Chief Complaint   Patient presents with     Family History Of Cancer     breast cancer in mother at 52, maternal aunt and paternal grandmother in 40s      Initial Vitals: /78  Pulse 99  Temp 97.7  F (36.5  C) (Tympanic)  Resp 18  Wt 89.9 kg (198 lb 3.2 oz)  SpO2 95%  BMI 30.14 kg/m2 Estimated body mass index is 30.14 kg/(m^2) as calculated from the following:    Height as of 11/15/17: 1.727 m (5' 8\").    Weight as of this encounter: 89.9 kg (198 lb 3.2 oz). Body surface area is 2.08 meters squared.  No Pain (0) Comment: Data Unavailable   No LMP recorded.  Allergies reviewed: Yes  Medications reviewed: Yes    Medications:   Pharmacy name entered into [facility]: [facility] [address] [place] [address]  [address]    Clinical concerns: Brst Ca     8 minutes for nursing intake (face to face time)     Marlena Montejo CMA   DISCHARGE PLAN:  Next appointments: See patient instruction section  Departure Mode: Ambulatory  Accompanied by: self  0 minutes for nursing discharge (face to face time)   Marlena Montejo CMA                "

## 2018-05-30 ENCOUNTER — RECORDS - HEALTHEAST (OUTPATIENT)
Dept: ADMINISTRATIVE | Facility: OTHER | Age: 43
End: 2018-05-30

## 2018-05-30 LAB
LAB AP CHARGES (HE HISTORICAL CONVERSION): NORMAL
PATH REPORT.COMMENTS IMP SPEC: NORMAL
PATH REPORT.COMMENTS IMP SPEC: NORMAL
PATH REPORT.FINAL DX SPEC: NORMAL
PATH REPORT.GROSS SPEC: NORMAL
PATH REPORT.MICROSCOPIC SPEC OTHER STN: NORMAL
PATH REPORT.RELEVANT HX SPEC: NORMAL
RESULT FLAG (HE HISTORICAL CONVERSION): NORMAL

## 2018-07-06 ENCOUNTER — HOSPITAL ENCOUNTER (EMERGENCY)
Facility: CLINIC | Age: 43
Discharge: HOME | End: 2018-07-06
Attending: EMERGENCY MEDICINE | Admitting: EMERGENCY MEDICINE
Payer: COMMERCIAL

## 2018-07-06 VITALS
SYSTOLIC BLOOD PRESSURE: 134 MMHG | DIASTOLIC BLOOD PRESSURE: 77 MMHG | RESPIRATION RATE: 16 BRPM | TEMPERATURE: 98.3 F | HEIGHT: 68 IN | OXYGEN SATURATION: 95 % | WEIGHT: 190 LBS | BODY MASS INDEX: 28.79 KG/M2

## 2018-07-06 DIAGNOSIS — R11.2 NAUSEA AND VOMITING, INTRACTABILITY OF VOMITING NOT SPECIFIED, UNSPECIFIED VOMITING TYPE: ICD-10-CM

## 2018-07-06 LAB
ALBUMIN SERPL-MCNC: 4.3 G/DL (ref 3.4–5)
ALBUMIN UR-MCNC: 30 MG/DL
ALP SERPL-CCNC: 68 U/L (ref 40–150)
ALT SERPL W P-5'-P-CCNC: 34 U/L (ref 0–50)
ANION GAP SERPL CALCULATED.3IONS-SCNC: 11 MMOL/L (ref 3–14)
APPEARANCE UR: ABNORMAL
AST SERPL W P-5'-P-CCNC: 39 U/L (ref 0–45)
BASOPHILS # BLD AUTO: 0 10E9/L (ref 0–0.2)
BASOPHILS NFR BLD AUTO: 0.3 %
BILIRUB SERPL-MCNC: 1.2 MG/DL (ref 0.2–1.3)
BILIRUB UR QL STRIP: NEGATIVE
BUN SERPL-MCNC: 22 MG/DL (ref 7–30)
CALCIUM SERPL-MCNC: 8.9 MG/DL (ref 8.5–10.1)
CHLORIDE SERPL-SCNC: 106 MMOL/L (ref 94–109)
CO2 SERPL-SCNC: 23 MMOL/L (ref 20–32)
COLOR UR AUTO: YELLOW
CREAT SERPL-MCNC: 0.71 MG/DL (ref 0.52–1.04)
DIFFERENTIAL METHOD BLD: NORMAL
EOSINOPHIL # BLD AUTO: 0.1 10E9/L (ref 0–0.7)
EOSINOPHIL NFR BLD AUTO: 0.8 %
ERYTHROCYTE [DISTWIDTH] IN BLOOD BY AUTOMATED COUNT: 12.6 % (ref 10–15)
GFR SERPL CREATININE-BSD FRML MDRD: >90 ML/MIN/1.7M2
GLUCOSE SERPL-MCNC: 130 MG/DL (ref 70–99)
GLUCOSE UR STRIP-MCNC: NEGATIVE MG/DL
HCG SERPL QL: NEGATIVE
HCT VFR BLD AUTO: 40.4 % (ref 35–47)
HGB BLD-MCNC: 13.7 G/DL (ref 11.7–15.7)
HGB UR QL STRIP: NEGATIVE
IMM GRANULOCYTES # BLD: 0.1 10E9/L (ref 0–0.4)
IMM GRANULOCYTES NFR BLD: 0.8 %
KETONES UR STRIP-MCNC: 20 MG/DL
LEUKOCYTE ESTERASE UR QL STRIP: NEGATIVE
LIPASE SERPL-CCNC: 159 U/L (ref 73–393)
LYMPHOCYTES # BLD AUTO: 1.3 10E9/L (ref 0.8–5.3)
LYMPHOCYTES NFR BLD AUTO: 21.5 %
MCH RBC QN AUTO: 31.9 PG (ref 26.5–33)
MCHC RBC AUTO-ENTMCNC: 33.9 G/DL (ref 31.5–36.5)
MCV RBC AUTO: 94 FL (ref 78–100)
MONOCYTES # BLD AUTO: 0.3 10E9/L (ref 0–1.3)
MONOCYTES NFR BLD AUTO: 4.5 %
MUCOUS THREADS #/AREA URNS LPF: PRESENT /LPF
NEUTROPHILS # BLD AUTO: 4.5 10E9/L (ref 1.6–8.3)
NEUTROPHILS NFR BLD AUTO: 72.1 %
NITRATE UR QL: NEGATIVE
NRBC # BLD AUTO: 0 10*3/UL
NRBC BLD AUTO-RTO: 0 /100
PH UR STRIP: 6 PH (ref 5–7)
PLATELET # BLD AUTO: 204 10E9/L (ref 150–450)
POTASSIUM SERPL-SCNC: 3.1 MMOL/L (ref 3.4–5.3)
PROT SERPL-MCNC: 8.1 G/DL (ref 6.8–8.8)
RBC # BLD AUTO: 4.3 10E12/L (ref 3.8–5.2)
RBC #/AREA URNS AUTO: 1 /HPF (ref 0–2)
SODIUM SERPL-SCNC: 140 MMOL/L (ref 133–144)
SOURCE: ABNORMAL
SP GR UR STRIP: 1.02 (ref 1–1.03)
SQUAMOUS #/AREA URNS AUTO: 1 /HPF (ref 0–1)
UROBILINOGEN UR STRIP-MCNC: 2 MG/DL (ref 0–2)
WBC # BLD AUTO: 6.2 10E9/L (ref 4–11)
WBC #/AREA URNS AUTO: 4 /HPF (ref 0–5)

## 2018-07-06 PROCEDURE — 81001 URINALYSIS AUTO W/SCOPE: CPT | Performed by: EMERGENCY MEDICINE

## 2018-07-06 PROCEDURE — 83690 ASSAY OF LIPASE: CPT | Performed by: EMERGENCY MEDICINE

## 2018-07-06 PROCEDURE — 84703 CHORIONIC GONADOTROPIN ASSAY: CPT | Performed by: EMERGENCY MEDICINE

## 2018-07-06 PROCEDURE — 25000128 H RX IP 250 OP 636: Performed by: EMERGENCY MEDICINE

## 2018-07-06 PROCEDURE — 85025 COMPLETE CBC W/AUTO DIFF WBC: CPT | Performed by: EMERGENCY MEDICINE

## 2018-07-06 PROCEDURE — 99284 EMERGENCY DEPT VISIT MOD MDM: CPT | Mod: 25

## 2018-07-06 PROCEDURE — 80053 COMPREHEN METABOLIC PANEL: CPT | Performed by: EMERGENCY MEDICINE

## 2018-07-06 PROCEDURE — 96375 TX/PRO/DX INJ NEW DRUG ADDON: CPT

## 2018-07-06 PROCEDURE — 96361 HYDRATE IV INFUSION ADD-ON: CPT

## 2018-07-06 PROCEDURE — 96365 THER/PROPH/DIAG IV INF INIT: CPT

## 2018-07-06 RX ORDER — METOCLOPRAMIDE 5 MG/1
5 TABLET ORAL 3 TIMES DAILY PRN
Qty: 30 TABLET | Refills: 0 | Status: SHIPPED | OUTPATIENT
Start: 2018-07-06

## 2018-07-06 RX ORDER — DIPHENHYDRAMINE HYDROCHLORIDE 50 MG/ML
25 INJECTION INTRAMUSCULAR; INTRAVENOUS ONCE
Status: COMPLETED | OUTPATIENT
Start: 2018-07-06 | End: 2018-07-06

## 2018-07-06 RX ORDER — METOCLOPRAMIDE HYDROCHLORIDE 5 MG/ML
10 INJECTION INTRAMUSCULAR; INTRAVENOUS ONCE
Status: COMPLETED | OUTPATIENT
Start: 2018-07-06 | End: 2018-07-06

## 2018-07-06 RX ORDER — POTASSIUM CL/LIDO/0.9 % NACL 10MEQ/0.1L
10 INTRAVENOUS SOLUTION, PIGGYBACK (ML) INTRAVENOUS
Status: DISCONTINUED | OUTPATIENT
Start: 2018-07-06 | End: 2018-07-06 | Stop reason: HOSPADM

## 2018-07-06 RX ADMIN — Medication 10 MEQ: at 16:50

## 2018-07-06 RX ADMIN — DIPHENHYDRAMINE HYDROCHLORIDE 25 MG: 50 INJECTION, SOLUTION INTRAMUSCULAR; INTRAVENOUS at 15:23

## 2018-07-06 RX ADMIN — SODIUM CHLORIDE 1000 ML: 9 INJECTION, SOLUTION INTRAVENOUS at 15:22

## 2018-07-06 RX ADMIN — METOCLOPRAMIDE 10 MG: 5 INJECTION, SOLUTION INTRAMUSCULAR; INTRAVENOUS at 15:23

## 2018-07-06 ASSESSMENT — ENCOUNTER SYMPTOMS
FEVER: 0
ABDOMINAL PAIN: 0
DIARRHEA: 0
DYSURIA: 0
NAUSEA: 1
VOMITING: 1
HEMATURIA: 0

## 2018-07-06 NOTE — ED AVS SNAPSHOT
[facility] Emergency Department    [address]    [facility] [address]    Phone:  [phone]    Fax:  [phone]                                       Maritza Hanson   MRN: [id number]    Department:  [facility] Emergency Department   Date of Visit:  7/6/2018           Patient Information     Date Of Birth          1975        Your diagnoses for this visit were:     Nausea and vomiting, intractability of vomiting not specified, unspecified vomiting type        You were seen by Merry Méndez MD.      Follow-up Information     Follow up with Ollie Loera PA-C In 3 days.    Specialty:  Physician Assistant    Contact information:    [facility]  [address] [facility] [address] [phone]          Discharge Instructions       Please follow up closely with your regular physician.     Please return to the ED if your symptoms worsen or if you develop new or concerning symptoms.       Discharge Instructions  Vomiting    You have been seen today for vomiting. This is usually caused by a virus, but some bacteria, parasites, medicines or other medical conditions can cause similar symptoms. At this time your doctor does not find that your vomiting is a sign of anything dangerous or life-threatening. However, sometimes the signs of serious illness do not show up right away. If you have new or worse symptoms, you may need to be seen again in the emergency department or by your primary doctor. Remember that serious problems like appendicitis can start as vomiting.     Return to the Emergency Department if:    You keep throwing up and you are not able to keep liquids down.     You feel you are getting dehydrated, such as being very thirsty, not urinating at least every 8-12 hours, or feeling faint or lightheaded.     You develop a new fever, or your fever continues for more than 2 days.     You have belly pain that seems worse than cramps, is in one spot, or is  getting worse over time.     You have blood in your vomit or stools.     You feel very weak.    You are not starting to improve within 24 hours of your visit here.     What can I do to help myself?    The most important thing to do is to drink clear liquids. If you have been vomiting a lot, it is best to have only small, frequent sips of liquids. Drinking too much at once may cause more vomiting. If you are vomiting often, you must replace minerals, sodium and potassium lost with your illness. Pedialyte  and sports drinks can help you replace these minerals. You can also drink clear liquids such as water, weak tea, apple juice, and 7-Up . Avoid acid liquids (orange), caffeine (coffee) or alcohol. Do not drink milk until you no longer have diarrhea.     After liquids are staying down, you may start eating mild foods. Soda crackers, toast, plain noodles, gelatin, applesauce and bananas are good first choices. Avoid foods that have acid, are spicy, fatty or have a lot of fiber (such as meats, coarse grains, vegetables). You may start eating these foods again in about 3 days when you are better.     Sometimes treatment includes prescription medicine to prevent nausea and vomiting. If your doctor prescribes these for you, take them as directed.     Don t take ibuprofen, or other nonsteroidal anti-inflammatory medicines without checking with your healthcare provider.   If you were given a prescription for medicine here today, be sure to read all of the information (including the package insert) that comes with your prescription.  This will include important information about the medicine, its side effects, and any warnings that you need to know about.  The pharmacist who fills the prescription can provide more information and answer questions you may have about the medicine.  If you have questions or concerns that the pharmacist cannot address, please call or return to the Emergency Department.       Opioid Medication  Information    Pain medications are among the most commonly prescribed medicines, so we are including this information for all our patients. If you did not receive pain medication or get a prescription for pain medicine, you can ignore it.     You may have been given a prescription for an opioid (narcotic) pain medicine and/or have received a pain medicine while here in the Emergency Department. These medicines can make you drowsy or impaired. You must not drive, operate dangerous equipment, or engage in any other dangerous activities while taking these medications. If you drive while taking these medications, you could be arrested for DUI, or driving under the influence. Do not drink any alcohol while you are taking these medications.     Opioid pain medications can cause addiction. If you have a history of chemical dependency of any type, you are at a higher risk of becoming addicted to pain medications.  Only take these prescribed medications to treat your pain when all other options have been tried. Take it for as short a time and as few doses as possible. Store your pain pills in a secure place, as they are frequently stolen and provide a dangerous opportunity for children or visitors in your house to start abusing these powerful medications. We will not replace any lost or stolen medicine.  As soon as your pain is better, you should flush all your remaining medication.     Many prescription pain medications contain Tylenol  (acetaminophen), including Vicodin , Tylenol #3 , Norco , Lortab , and Percocet .  You should not take any extra pills of Tylenol  if you are using these prescription medications or you can get very sick.  Do not ever take more than 3000 mg of acetaminophen in any 24 hour period.    All opioids tend to cause constipation. Drink plenty of water and eat foods that have a lot of fiber, such as fruits, vegetables, prune juice, apple juice and high fiber cereal.  Take a laxative if you don t  move your bowels at least every other day. Miralax , Milk of Magnesia, Colace , or Senna  can be used to keep you regular.      Remember that you can always come back to the Emergency Department if you are not able to see your regular doctor in the amount of time listed above, if you get any new symptoms, or if there is anything that worries you.              Your next 10 appointments already scheduled     May 15, 2019 10:00 AM CDT   Return Visit with JENNIFER Guerrero CNS   Cancer Risk Management Program ([facility])    [facility]  [address]  [facility] [address] [phone]              24 Hour Appointment Hotline       To make an appointment at any [facility] clinic, call [phone] ([phone]). If you don't have a family doctor or clinic, we will help you find one. [facility] clinics are conveniently located to serve the needs of you and your family.             Review of your medicines      START taking        Dose / Directions Last dose taken    metoclopramide 5 MG tablet   Commonly known as:  REGLAN   Dose:  5 mg   Quantity:  30 tablet        Take 1 tablet (5 mg) by mouth 3 times daily as needed   Refills:  0          Our records show that you are taking the medicines listed below. If these are incorrect, please call your family doctor or clinic.        Dose / Directions Last dose taken    EPINEPHrine 0.3 MG/0.3ML injection 2-pack   Commonly known as:  EPIPEN/ADRENACLICK/or ANY BX GENERIC EQUIV   Dose:  1 mg        1 mg   Refills:  0        ferrous sulfate 325 (65 Fe) MG tablet   Commonly known as:  IRON   Dose:  325 mg        Take 325 mg by mouth daily   Refills:  0        gabapentin 300 MG capsule   Commonly known as:  NEURONTIN   Dose:  3 capsule        Take 3 capsules by mouth   Refills:  0        LORazepam 1 MG tablet   Commonly known as:  ATIVAN   Dose:  0.5-1 mg   Quantity:  2 tablet        Take 0.5-1 tablets (0.5-1 mg) by mouth  every 8 hours as needed for anxiety or other (prior to breast MRI) Take 30 minutes prior to departure.  Do not operate a vehicle after taking this medication   Refills:  0        MONTELUKAST SODIUM PO   Dose:  10 mg        Take 10 mg by mouth At Bedtime   Refills:  0        NEXIUM PO        Refills:  0        vitamin D 54207 UNIT capsule   Commonly known as:  ERGOCALCIFEROL        TK 1 C PO 1 TIME WEEKLY   Refills:  0        ZYRTEC ALLERGY PO   Dose:  10 mg        Take 10 mg by mouth 2 times daily   Refills:  0                Prescriptions were sent or printed at these locations (1 Prescription)                   Other Prescriptions                Printed at Department/Unit printer (1 of 1)         metoclopramide (REGLAN) 5 MG tablet                Procedures and tests performed during your visit     CBC + differential    Comprehensive metabolic panel    HCG QUALitative pregnancy (blood)    Lipase    UA with Microscopic      Orders Needing Specimen Collection     None      Pending Results     No orders found from 7/4/2018 to 7/7/2018.            Pending Culture Results     No orders found from 7/4/2018 to 7/7/2018.            Pending Results Instructions     If you had any lab results that were not finalized at the time of your Discharge, you can call the ED Lab Result RN at [phone]. You will be contacted by this team for any positive Lab results or changes in treatment. The nurses are available 7 days a week from 10A to 6:30P.  You can leave a message 24 hours per day and they will return your call.        Test Results From Your Hospital Stay        7/6/2018  3:35 PM      Component Results     Component Value Ref Range & Units Status    WBC 6.2 4.0 - 11.0 10e9/L Final    RBC Count 4.30 3.8 - 5.2 10e12/L Final    Hemoglobin 13.7 11.7 - 15.7 g/dL Final    Hematocrit 40.4 35.0 - 47.0 % Final    MCV 94 78 - 100 fl Final    MCH 31.9 26.5 - 33.0 pg Final    MCHC 33.9 31.5 - 36.5 g/dL Final    RDW 12.6 10.0 - 15.0 %  Final    Platelet Count 204 150 - 450 10e9/L Final    Diff Method Automated Method  Final    % Neutrophils 72.1 % Final    % Lymphocytes 21.5 % Final    % Monocytes 4.5 % Final    % Eosinophils 0.8 % Final    % Basophils 0.3 % Final    % Immature Granulocytes 0.8 % Final    Nucleated RBCs 0 0 /100 Final    Absolute Neutrophil 4.5 1.6 - 8.3 10e9/L Final    Absolute Lymphocytes 1.3 0.8 - 5.3 10e9/L Final    Absolute Monocytes 0.3 0.0 - 1.3 10e9/L Final    Absolute Eosinophils 0.1 0.0 - 0.7 10e9/L Final    Absolute Basophils 0.0 0.0 - 0.2 10e9/L Final    Abs Immature Granulocytes 0.1 0 - 0.4 10e9/L Final    Absolute Nucleated RBC 0.0  Final         7/6/2018  3:51 PM      Component Results     Component Value Ref Range & Units Status    Sodium 140 133 - 144 mmol/L Final    Potassium 3.1 (L) 3.4 - 5.3 mmol/L Final    Chloride 106 94 - 109 mmol/L Final    Carbon Dioxide 23 20 - 32 mmol/L Final    Anion Gap 11 3 - 14 mmol/L Final    Glucose 130 (H) 70 - 99 mg/dL Final    Urea Nitrogen 22 7 - 30 mg/dL Final    Creatinine 0.71 0.52 - 1.04 mg/dL Final    GFR Estimate >90 >60 mL/min/1.7m2 Final    Non  GFR Calc    GFR Estimate If Black >90 >60 mL/min/1.7m2 Final    African American GFR Calc    Calcium 8.9 8.5 - 10.1 mg/dL Final    Bilirubin Total 1.2 0.2 - 1.3 mg/dL Final    Albumin 4.3 3.4 - 5.0 g/dL Final    Protein Total 8.1 6.8 - 8.8 g/dL Final    Alkaline Phosphatase 68 40 - 150 U/L Final    ALT 34 0 - 50 U/L Final    AST 39 0 - 45 U/L Final         7/6/2018  3:51 PM      Component Results     Component Value Ref Range & Units Status    Lipase 159 73 - 393 U/L Final         7/6/2018  3:54 PM      Component Results     Component Value Ref Range & Units Status    HCG Qualitative Serum Negative NEG^Negative Final    This test is for screening purposes.  Results should be interpreted along with   the clinical picture.  Confirmation testing is available if warranted by   ordering KVT681, HCG Quantitative  Pregnancy.           7/6/2018  5:11 PM      Component Results     Component Value Ref Range & Units Status    Color Urine Yellow  Final    Appearance Urine Slightly Cloudy  Final    Glucose Urine Negative NEG^Negative mg/dL Final    Bilirubin Urine Negative NEG^Negative Final    Ketones Urine 20 (A) NEG^Negative mg/dL Final    Specific Gravity Urine 1.023 1.003 - 1.035 Final    Blood Urine Negative NEG^Negative Final    pH Urine 6.0 5.0 - 7.0 pH Final    Protein Albumin Urine 30 (A) NEG^Negative mg/dL Final    Urobilinogen mg/dL 2.0 0.0 - 2.0 mg/dL Final    Nitrite Urine Negative NEG^Negative Final    Leukocyte Esterase Urine Negative NEG^Negative Final    Source Midstream Urine  Final    WBC Urine 4 0 - 5 /HPF Final    RBC Urine 1 0 - 2 /HPF Final    Squamous Epithelial /HPF Urine 1 0 - 1 /HPF Final    Mucous Urine Present (A) NEG^Negative /LPF Final                Clinical Quality Measure: Blood Pressure Screening     Your blood pressure was checked while you were in the emergency department today. The last reading we obtained was  BP: 134/77 . Please read the guidelines below about what these numbers mean and what you should do about them.  If your systolic blood pressure (the top number) is less than 120 and your diastolic blood pressure (the bottom number) is less than 80, then your blood pressure is normal. There is nothing more that you need to do about it.  If your systolic blood pressure (the top number) is 120-139 or your diastolic blood pressure (the bottom number) is 80-89, your blood pressure may be higher than it should be. You should have your blood pressure rechecked within a year by a primary care provider.  If your systolic blood pressure (the top number) is 140 or greater or your diastolic blood pressure (the bottom number) is 90 or greater, you may have high blood pressure. High blood pressure is treatable, but if left untreated over time it can put you at risk for heart attack, stroke, or kidney  failure. You should have your blood pressure rechecked by a primary care provider within the next 4 weeks.  If your provider in the emergency department today gave you specific instructions to follow-up with your doctor or provider even sooner than that, you should follow that instruction and not wait for up to 4 weeks for your follow-up visit.        Thank you for choosing [facility]       Thank you for choosing [facility] for your care. Our goal is always to provide you with excellent care. Hearing back from our patients is one way we can continue to improve our services. Please take a few minutes to complete the written survey that you may receive in the mail after you visit with us. Thank you!        [employer] Information     [employer] gives you secure access to your electronic health record. If you see a primary care provider, you can also send messages to your care team and make appointments. If you have questions, please call your primary care clinic.  If you do not have a primary care provider, please call [phone] and they will assist you.        Care EveryWhere ID     This is your Care EveryWhere ID. This could be used by other organizations to access your [facility] medical records  [id number]        Equal Access to Services     EBONI JO : Hadjeannie Dang, oneyda shaw, angela ellis. So [facility] [phone].    ATENCIÓN: Si habla español, tiene a quintero disposición servicios gratuitos de asistencia lingüística. Yaakov al [phone].    We comply with applicable federal civil rights laws and [place] laws. We do not discriminate on the basis of race, color, national origin, age, disability, sex, sexual orientation, or gender identity.            After Visit Summary       This is your record. Keep this with you and show to your community pharmacist(s) and doctor(s) at your next visit.

## 2018-07-06 NOTE — ED AVS SNAPSHOT
[facility] Emergency Department    [address]    [facility] [address]    Phone:  [phone]    Fax:  [phone]                                       Maritza Hanson   MRN: [id number]    Department:  [facility] Emergency Department   Date of Visit:  7/6/2018           After Visit Summary Signature Page     I have received my discharge instructions, and my questions have been answered. I have discussed any challenges I see with this plan with the nurse or doctor.    ..........................................................................................................................................  Patient/Patient Representative Signature      ..........................................................................................................................................  Patient Representative Print Name and Relationship to Patient    ..................................................               ................................................  Date                                            Time    ..........................................................................................................................................  Reviewed by Signature/Title    ...................................................              ..............................................  Date                                                            Time

## 2018-07-06 NOTE — DISCHARGE INSTRUCTIONS
Please follow up closely with your regular physician.     Please return to the ED if your symptoms worsen or if you develop new or concerning symptoms.       Discharge Instructions  Vomiting    You have been seen today for vomiting. This is usually caused by a virus, but some bacteria, parasites, medicines or other medical conditions can cause similar symptoms. At this time your doctor does not find that your vomiting is a sign of anything dangerous or life-threatening. However, sometimes the signs of serious illness do not show up right away. If you have new or worse symptoms, you may need to be seen again in the emergency department or by your primary doctor. Remember that serious problems like appendicitis can start as vomiting.     Return to the Emergency Department if:    You keep throwing up and you are not able to keep liquids down.     You feel you are getting dehydrated, such as being very thirsty, not urinating at least every 8-12 hours, or feeling faint or lightheaded.     You develop a new fever, or your fever continues for more than 2 days.     You have belly pain that seems worse than cramps, is in one spot, or is getting worse over time.     You have blood in your vomit or stools.     You feel very weak.    You are not starting to improve within 24 hours of your visit here.     What can I do to help myself?    The most important thing to do is to drink clear liquids. If you have been vomiting a lot, it is best to have only small, frequent sips of liquids. Drinking too much at once may cause more vomiting. If you are vomiting often, you must replace minerals, sodium and potassium lost with your illness. Pedialyte  and sports drinks can help you replace these minerals. You can also drink clear liquids such as water, weak tea, apple juice, and 7-Up . Avoid acid liquids (orange), caffeine (coffee) or alcohol. Do not drink milk until you no longer have diarrhea.     After liquids are staying down, you may  start eating mild foods. Soda crackers, toast, plain noodles, gelatin, applesauce and bananas are good first choices. Avoid foods that have acid, are spicy, fatty or have a lot of fiber (such as meats, coarse grains, vegetables). You may start eating these foods again in about 3 days when you are better.     Sometimes treatment includes prescription medicine to prevent nausea and vomiting. If your doctor prescribes these for you, take them as directed.     Don t take ibuprofen, or other nonsteroidal anti-inflammatory medicines without checking with your healthcare provider.   If you were given a prescription for medicine here today, be sure to read all of the information (including the package insert) that comes with your prescription.  This will include important information about the medicine, its side effects, and any warnings that you need to know about.  The pharmacist who fills the prescription can provide more information and answer questions you may have about the medicine.  If you have questions or concerns that the pharmacist cannot address, please call or return to the Emergency Department.       Opioid Medication Information    Pain medications are among the most commonly prescribed medicines, so we are including this information for all our patients. If you did not receive pain medication or get a prescription for pain medicine, you can ignore it.     You may have been given a prescription for an opioid (narcotic) pain medicine and/or have received a pain medicine while here in the Emergency Department. These medicines can make you drowsy or impaired. You must not drive, operate dangerous equipment, or engage in any other dangerous activities while taking these medications. If you drive while taking these medications, you could be arrested for DUI, or driving under the influence. Do not drink any alcohol while you are taking these medications.     Opioid pain medications can cause addiction. If you have  a history of chemical dependency of any type, you are at a higher risk of becoming addicted to pain medications.  Only take these prescribed medications to treat your pain when all other options have been tried. Take it for as short a time and as few doses as possible. Store your pain pills in a secure place, as they are frequently stolen and provide a dangerous opportunity for children or visitors in your house to start abusing these powerful medications. We will not replace any lost or stolen medicine.  As soon as your pain is better, you should flush all your remaining medication.     Many prescription pain medications contain Tylenol  (acetaminophen), including Vicodin , Tylenol #3 , Norco , Lortab , and Percocet .  You should not take any extra pills of Tylenol  if you are using these prescription medications or you can get very sick.  Do not ever take more than 3000 mg of acetaminophen in any 24 hour period.    All opioids tend to cause constipation. Drink plenty of water and eat foods that have a lot of fiber, such as fruits, vegetables, prune juice, apple juice and high fiber cereal.  Take a laxative if you don t move your bowels at least every other day. Miralax , Milk of Magnesia, Colace , or Senna  can be used to keep you regular.      Remember that you can always come back to the Emergency Department if you are not able to see your regular doctor in the amount of time listed above, if you get any new symptoms, or if there is anything that worries you.

## 2018-07-06 NOTE — ED PROVIDER NOTES
History   Chief Complaint:  Nausea & Vomiting    HPI   Maritza Hanson is a 43 year old female with a history of hyperlipidemia who presents with nausea and vomiting. The patient reports that yesterday she developed nausea and vomiting where she was unable to keep down food or liquids. She notes she had similar episodes of vomiting in the past, after which she had cholecystectomy. She has had the stomach flu in the past, but did not have any vomiting with it, so she adds frequent vomiting is abnormal for her. She tried to take Benadryl at home for the symptoms, but was unable to keep it down. The patient notes that no one else around her is having similar symptoms and she has not traveled or been on antibiotics recently. She denies fever, abdominal pain, diarrhea, hematuria, and dysuria.     Allergies:  Hydrocodone  Sulfa drugs  Zofran    Medications:    Zyrtec  Epipen   Nexium  Iron  Gabapentin  Ativan  Singulair  Vitamin D    Past Medical History:    IBS  Anemia  Asthma  Depression with anxiety  Mild preeclampsia  POTS  Hyperlipidemia  GERD  Tourette's syndrome  Eye motility disorder  Chronic cough  Laryngeal granuloma  Stress incontinence    Past Surgical History:    Laparoscopic cholecystectomy  Removal of sesamoid bone right foot  [place] teeth extraction    Family History:    Breast cancer  Thyroid disease  Psychotic disorder  Obesity  Diabetes  Hearing loss  Colon cancer    Social History:  Smoking status: Never smoker  Alcohol use: Yes, very rarely   Marital Status:   [2]    Review of Systems   Constitutional: Negative for fever.   Gastrointestinal: Positive for nausea and vomiting. Negative for abdominal pain and diarrhea.   Genitourinary: Negative for dysuria and hematuria.   All other systems reviewed and are negative.    Physical Exam   Patient Vitals for the past 24 hrs:   BP Temp Temp src Heart Rate Resp SpO2 Height Weight   07/06/18 1526 - 98.3  F (36.8  C) Oral - - - - -   07/06/18 1412  "134/77 - Temporal 90 16 96 % 1.727 m (5' 8\") 86.2 kg (190 lb)     Physical Exam  Constitutional: The patient is oriented to person, place, and time. Alert and cooperative.  HENT:   Right Ear: External ear normal.   Left Ear: External ear normal.   Nose: Nose normal.   Mouth/Throat: Moist mucous membranes.   Eyes: Conjunctivae, EOM and lids are normal.   Neck: Trachea normal. Normal range of motion. Neck supple.   Cardiovascular: Normal rate, regular rhythm, normal heart sounds, and intact distal pulses.    Pulmonary/Chest: Effort normal and breath sounds equal bilaterally. No crackles or wheezing.   Abdominal: Soft. No tenderness. No rebound and no guarding.   Musculoskeletal: Normal range of motion.  No extremity tenderness or edema.  Neurological: Alert and Oriented. Moves all extremities equally.   Skin: Skin is dry. No rash noted.        Emergency Department Course   Laboratory:  CBC: WNL (WBC 6.2, HGB 13.7, )  CMP: Potassium 3.1 (L), Glucose 130 (H) o/w WNL (Creatinine 0.71)  UA: Ketones 20, Protein Albumin 30, Mucous- present o/w negative  Lipase: 159  HCG Pregnancy: Negative    Interventions:  1522: NS 1L IV Bolus  1523: Reglan 10 mg IV  1523: Benadryl 25 mg IV  1650: Potassium Chloride 10 mEq IV    Emergency Department Course:  Past medical records, nursing notes, and vitals reviewed.  1502: I performed an exam of the patient and obtained history, as documented above.  IV inserted and blood drawn.  A urine sample was obtained.    1721: I rechecked the patient. Explained findings to patient.    Findings and plan explained to the patient. Patient discharged home with instructions regarding supportive care, medications, and reasons to return. The importance of close follow-up was reviewed.     Impression & Plan    Medical Decision Making:  Maritza Hanson is a 43 year old female with a history of hyperlipidemia who presents with nausea and vomiting.  Upon presentation in the ED, the patient is " nontoxic-appearing and vitals are within normal limits and stable.  On exam, she is well-appearing.  She is alert, oriented, neurologic exam is nonfocal.  Cardiopulmonary exam is unremarkable.  Abdomen is soft and nontender throughout.  The rest of her exam is as mentioned above.    Labs were obtained and are as mentioned above.  Notably, she does not have a leukocytosis.  Potassium is mildly low at 3.1, therefore this was replaced.  The patient has no abdominal tenderness on exam to suggest an acute surgical abdomen or warrant imaging of the abdomen at this time.  Specifically, she has no tenderness to palpation in the right upper quadrant to suggest acute gallbladder or hepatic pathology.  She has no tenderness to palpation in the right lower quadrant to suggest an acute appendicitis.  Upon my repeat evaluation following the IV fluids and antiemetics, patient does note significant improvement in her symptoms.  She was given a trial of PO and tolerated this well without emesis.  Given this patient's history and presentation, I am most suspicious for a viral GI illness.  She denies significant diarrhea or hematochezia.  She is afebrile without a leukocytosis, therefore an invasive bacterial etiology of her symptoms is unlikely.  Given that she is well appearing, I do feel she is safe for discharge to home.  I did discuss with the patient that I recommend close follow-up with a primary care physician and she notes understanding and agreement.  Return instructions were given.  She was stable/improved at the time of discharge.    Diagnosis:    ICD-10-CM   1. Nausea and vomiting, intractability of vomiting not specified, unspecified vomiting type R11.2       Disposition:  Discharged to home.    Discharge Medications:  New Prescriptions    METOCLOPRAMIDE (REGLAN) 5 MG TABLET    Take 1 tablet (5 mg) by mouth 3 times daily as needed       Kayden Sanz  7/6/2018   [facility] EMERGENCY DEPARTMENT  Kayden PATEL  Umu am serving as a scribe at 3:02 PM on 7/6/2018 to document services personally performed by Merry Méndez MD based on my observations and the provider's statements to me.        Merry Méndez MD  07/06/18 2235

## 2018-07-19 ENCOUNTER — HOSPITAL ENCOUNTER (OUTPATIENT)
Dept: NUCLEAR MEDICINE | Facility: CLINIC | Age: 43
Setting detail: NUCLEAR MEDICINE
Discharge: HOME | End: 2018-07-19
Attending: INTERNAL MEDICINE | Admitting: INTERNAL MEDICINE
Payer: COMMERCIAL

## 2018-07-19 DIAGNOSIS — G43.A0 CYCLIC VOMITING SYNDROME, INTRACTABILITY OF VOMITING NOT SPECIFIED, PRESENCE OF NAUSEA NOT SPECIFIED: ICD-10-CM

## 2018-07-19 PROCEDURE — 78264 GASTRIC EMPTYING IMG STUDY: CPT

## 2018-07-19 PROCEDURE — A9541 TC99M SULFUR COLLOID: HCPCS | Performed by: RADIOLOGY

## 2018-07-19 PROCEDURE — 34300033 ZZH RX 343: Performed by: RADIOLOGY

## 2018-07-19 RX ADMIN — Medication 2 MCI.: at 08:14

## 2018-08-02 ENCOUNTER — TELEPHONE (OUTPATIENT)
Dept: GASTROENTEROLOGY | Facility: CLINIC | Age: 43
End: 2018-08-02

## 2018-08-02 NOTE — TELEPHONE ENCOUNTER
LVM for patient in regards to message that was sent from call center. Informed patient that we have not received any records or referral and until we have those pieces we cannot start referral process. Left call back number for patient to call with any questions.

## 2018-09-25 ENCOUNTER — TRANSFERRED RECORDS (OUTPATIENT)
Dept: HEALTH INFORMATION MANAGEMENT | Facility: CLINIC | Age: 43
End: 2018-09-25

## 2018-09-25 LAB — PAP-ABSTRACT: NORMAL

## 2018-11-06 ENCOUNTER — HOSPITAL ENCOUNTER (OUTPATIENT)
Dept: MRI IMAGING | Facility: CLINIC | Age: 43
End: 2018-11-06
Attending: PHYSICIAN ASSISTANT
Payer: COMMERCIAL

## 2018-11-06 ENCOUNTER — HOSPITAL ENCOUNTER (OUTPATIENT)
Dept: MRI IMAGING | Facility: CLINIC | Age: 43
Discharge: HOME | End: 2018-11-06
Attending: PHYSICIAN ASSISTANT | Admitting: PHYSICIAN ASSISTANT
Payer: COMMERCIAL

## 2018-11-06 DIAGNOSIS — G24.9 DYSTONIA: ICD-10-CM

## 2018-11-06 DIAGNOSIS — R51.9 HEADACHE: ICD-10-CM

## 2018-11-06 DIAGNOSIS — G25.69 OTHER TICS OF ORGANIC ORIGIN: ICD-10-CM

## 2018-11-06 DIAGNOSIS — M62.838 MUSCLE SPASMS OF NECK: ICD-10-CM

## 2018-11-06 DIAGNOSIS — M54.2 NECK PAIN: ICD-10-CM

## 2018-11-06 DIAGNOSIS — M62.838 MUSCLE SPASM: ICD-10-CM

## 2018-11-06 PROCEDURE — 25500064 ZZH RX 255 OP 636: Performed by: RADIOLOGY

## 2018-11-06 PROCEDURE — 72156 MRI NECK SPINE W/O & W/DYE: CPT

## 2018-11-06 PROCEDURE — A9585 GADOBUTROL INJECTION: HCPCS | Performed by: RADIOLOGY

## 2018-11-06 PROCEDURE — 70553 MRI BRAIN STEM W/O & W/DYE: CPT

## 2018-11-06 RX ORDER — GADOBUTROL 604.72 MG/ML
10 INJECTION INTRAVENOUS ONCE
Status: COMPLETED | OUTPATIENT
Start: 2018-11-06 | End: 2018-11-06

## 2018-11-06 RX ADMIN — GADOBUTROL 9 ML: 604.72 INJECTION INTRAVENOUS at 13:34

## 2018-11-15 ENCOUNTER — TELEPHONE (OUTPATIENT)
Dept: FAMILY MEDICINE | Facility: CLINIC | Age: 43
End: 2018-11-15

## 2018-11-15 NOTE — TELEPHONE ENCOUNTER
Panel Management Review      Patient has the following on her problem list:     Depression / Dysthymia review    Measure:  Needs PHQ-9 score of 4 or less during index window.  Administer PHQ-9 and if score is 5 or more, send encounter to provider for next steps.    5 - 7 month window range:     PHQ-9 SCORE 6/5/2014 9/24/2014 4/8/2015   Total Score 7 5 2       If PHQ-9 recheck is 5 or more, route to provider for next steps.    Patient is due for:  PHQ9 and DAP      Composite cancer screening  Chart review shows that this patient is due/due soon for the following Pap Smear  Summary:    Patient is due/failing the following:   DAP, PAP and PHQ9    Action needed:   Patient needs office visit for pap, DAP. and Patient needs to do PHQ9.    Type of outreach:    NONE, patient is an [facility] patient, will send to Abstracting to have results placed in chart.    Questions for provider review:    None                                                                                                                                    Josie Amato MA

## 2018-12-07 ENCOUNTER — TELEPHONE (OUTPATIENT)
Dept: ONCOLOGY | Facility: CLINIC | Age: 43
End: 2018-12-07

## 2018-12-07 ENCOUNTER — HOSPITAL ENCOUNTER (OUTPATIENT)
Dept: MRI IMAGING | Facility: CLINIC | Age: 43
Discharge: HOME | End: 2018-12-07
Attending: CLINICAL NURSE SPECIALIST | Admitting: CLINICAL NURSE SPECIALIST
Payer: COMMERCIAL

## 2018-12-07 ENCOUNTER — MYC MEDICAL ADVICE (OUTPATIENT)
Dept: ONCOLOGY | Facility: CLINIC | Age: 43
End: 2018-12-07

## 2018-12-07 DIAGNOSIS — Z91.89 AT HIGH RISK FOR BREAST CANCER: ICD-10-CM

## 2018-12-07 DIAGNOSIS — R92.8 ABNORMAL FINDING ON BREAST IMAGING: Primary | ICD-10-CM

## 2018-12-07 DIAGNOSIS — Z12.39 ENCOUNTER FOR BREAST CANCER SCREENING OTHER THAN MAMMOGRAM: ICD-10-CM

## 2018-12-07 PROCEDURE — 25500064 ZZH RX 255 OP 636: Performed by: CLINICAL NURSE SPECIALIST

## 2018-12-07 PROCEDURE — 0159T MR BREAST BILATERAL W/O & W CONTRAST: CPT

## 2018-12-07 PROCEDURE — A9585 GADOBUTROL INJECTION: HCPCS | Performed by: CLINICAL NURSE SPECIALIST

## 2018-12-07 RX ORDER — GADOBUTROL 604.72 MG/ML
10 INJECTION INTRAVENOUS ONCE
Status: COMPLETED | OUTPATIENT
Start: 2018-12-07 | End: 2018-12-07

## 2018-12-07 RX ADMIN — GADOBUTROL 8 ML: 604.72 INJECTION INTRAVENOUS at 09:56

## 2018-12-13 ENCOUNTER — TELEPHONE (OUTPATIENT)
Dept: ONCOLOGY | Facility: CLINIC | Age: 43
End: 2018-12-13

## 2018-12-13 ENCOUNTER — HOSPITAL ENCOUNTER (OUTPATIENT)
Dept: MAMMOGRAPHY | Facility: CLINIC | Age: 43
Discharge: HOME | End: 2018-12-13
Attending: CLINICAL NURSE SPECIALIST | Admitting: CLINICAL NURSE SPECIALIST
Payer: COMMERCIAL

## 2018-12-13 ENCOUNTER — HOSPITAL ENCOUNTER (OUTPATIENT)
Dept: ULTRASOUND IMAGING | Facility: CLINIC | Age: 43
End: 2018-12-13
Attending: CLINICAL NURSE SPECIALIST
Payer: COMMERCIAL

## 2018-12-13 DIAGNOSIS — R92.8 ABNORMAL FINDING ON BREAST IMAGING: ICD-10-CM

## 2018-12-13 DIAGNOSIS — F41.9 ANXIETY: ICD-10-CM

## 2018-12-13 DIAGNOSIS — F41.9 ANXIETY: Primary | ICD-10-CM

## 2018-12-13 PROCEDURE — 76642 ULTRASOUND BREAST LIMITED: CPT | Mod: RT

## 2018-12-13 PROCEDURE — G0279 TOMOSYNTHESIS, MAMMO: HCPCS

## 2018-12-13 RX ORDER — LORAZEPAM 1 MG/1
0.5-1 TABLET ORAL EVERY 8 HOURS PRN
Qty: 2 TABLET | Refills: 0 | Status: SHIPPED | OUTPATIENT
Start: 2018-12-13 | End: 2018-12-13

## 2018-12-13 RX ORDER — LORAZEPAM 1 MG/1
0.5-1 TABLET ORAL EVERY 8 HOURS PRN
Qty: 2 TABLET | Refills: 0 | Status: SHIPPED | OUTPATIENT
Start: 2018-12-13 | End: 2019-04-06

## 2018-12-28 ENCOUNTER — HOSPITAL ENCOUNTER (OUTPATIENT)
Dept: MAMMOGRAPHY | Facility: CLINIC | Age: 43
End: 2018-12-28
Attending: CLINICAL NURSE SPECIALIST
Payer: COMMERCIAL

## 2018-12-28 ENCOUNTER — HOSPITAL ENCOUNTER (OUTPATIENT)
Dept: MRI IMAGING | Facility: CLINIC | Age: 43
Discharge: HOME | End: 2018-12-28
Attending: CLINICAL NURSE SPECIALIST | Admitting: CLINICAL NURSE SPECIALIST
Payer: COMMERCIAL

## 2018-12-28 DIAGNOSIS — R92.8 ABNORMAL FINDING ON BREAST IMAGING: ICD-10-CM

## 2018-12-28 PROCEDURE — 88305 TISSUE EXAM BY PATHOLOGIST: CPT | Performed by: RADIOLOGY

## 2018-12-28 PROCEDURE — 88305 TISSUE EXAM BY PATHOLOGIST: CPT | Mod: 26 | Performed by: RADIOLOGY

## 2018-12-28 PROCEDURE — A9585 GADOBUTROL INJECTION: HCPCS | Performed by: CLINICAL NURSE SPECIALIST

## 2018-12-28 PROCEDURE — 25800025 ZZH RX 258: Performed by: CLINICAL NURSE SPECIALIST

## 2018-12-28 PROCEDURE — 40000986 MA POST PROCEDURE RIGHT

## 2018-12-28 PROCEDURE — 19085 BX BREAST 1ST LESION MR IMAG: CPT

## 2018-12-28 PROCEDURE — 25500064 ZZH RX 255 OP 636: Performed by: CLINICAL NURSE SPECIALIST

## 2018-12-28 RX ORDER — ACYCLOVIR 200 MG/1
60 CAPSULE ORAL ONCE
Status: COMPLETED | OUTPATIENT
Start: 2018-12-28 | End: 2018-12-28

## 2018-12-28 RX ORDER — GADOBUTROL 604.72 MG/ML
8 INJECTION INTRAVENOUS ONCE
Status: COMPLETED | OUTPATIENT
Start: 2018-12-28 | End: 2018-12-28

## 2018-12-28 RX ADMIN — GADOBUTROL 8 ML: 604.72 INJECTION INTRAVENOUS at 10:44

## 2018-12-28 RX ADMIN — SODIUM CHLORIDE 30 ML: 9 INJECTION, SOLUTION INTRAMUSCULAR; INTRAVENOUS; SUBCUTANEOUS at 10:45

## 2018-12-28 NOTE — DISCHARGE INSTRUCTIONS
After Your Breast Biopsy    Bleeding or bruising: Slight bruising is normal.  If you bleed through the bandage, put direct pressure on the breast.  If you are still bleeding after 20 minutes, call the doctor who ordered the exam.    Bandages: Keep your bandage in place until tomorrow morning.  Do not get it wet.  Leave the tape in place for two days.  On the second day, cover it with a Band-Aid.    Activity: You may shower the morning after the exam.  No heavy activity (lifting, vacuuming) for 24 hours.    Discomfort: Wear your bra overnight to support the breast.  You may take Tylenol (acetaminophen) for pain.  If you had a stereotactic of MR-directed biopsy, you may take aspirin or ibuprofen (Advil, Motrin) the morning after your biopsy, unless your doctor tells you not to.    Infection: Infection is rare.  Symptoms include fever, redness, increasing pain and fluid draining from the biopsy site.  If you have any of these symptoms, please call the doctor who ordered your exam.    Results: Results may take up to three business days.  If you have not heard your results in three days, call the Breast Center Nurse at [phone] or [phone].  In rare cases, we may need to do another biopsy.    Call the doctor who ordered your exam if:    You have bleeding that lasts more than 20 minutes.    You have pain that cannot be controlled.    You have signs of infection (fever, redness, drainage or other signs).    You have not had your results within three days.    Nurse navigator: Our nurse navigator is here to answer your questions and help you set up future clinic visits.  Please call [phone].    Thank you for choosing [facility].  Please call us if you have questions or concerns about your biopsy.

## 2019-01-03 ENCOUNTER — TELEPHONE (OUTPATIENT)
Dept: ONCOLOGY | Facility: CLINIC | Age: 44
End: 2019-01-03

## 2019-01-03 ENCOUNTER — TELEPHONE (OUTPATIENT)
Dept: MAMMOGRAPHY | Facility: CLINIC | Age: 44
End: 2019-01-03

## 2019-01-03 DIAGNOSIS — R92.8 ABNORMAL MRI, BREAST: Primary | ICD-10-CM

## 2019-01-03 NOTE — TELEPHONE ENCOUNTER
After review by Breast Center Radiologist, Dr. Sergei Cook, Ms. Hanson was called and given her 12/28/2018 Right Breast Biopsy results (Normal Breast Tissue) and recommended Follow up (6 mo. Right Breast U/S).  Biopsy site without issues or concerns.   I encouraged her to perform monthly breast self exams and to contact her doctor with any further breast concerns.

## 2019-01-03 NOTE — ADDENDUM NOTE
Encounter addended by: Catie Dumotn RN on: 1/3/2019 11:18 AM   Actions taken: Visit Navigator Flowsheet section accepted

## 2019-01-09 DIAGNOSIS — Z12.39 BREAST CANCER SCREENING, HIGH RISK PATIENT: Primary | ICD-10-CM

## 2019-04-06 ENCOUNTER — HOSPITAL ENCOUNTER (EMERGENCY)
Facility: CLINIC | Age: 44
Discharge: HOME | End: 2019-04-06
Attending: EMERGENCY MEDICINE | Admitting: EMERGENCY MEDICINE
Payer: COMMERCIAL

## 2019-04-06 VITALS
RESPIRATION RATE: 16 BRPM | OXYGEN SATURATION: 97 % | DIASTOLIC BLOOD PRESSURE: 90 MMHG | HEART RATE: 103 BPM | TEMPERATURE: 99 F | SYSTOLIC BLOOD PRESSURE: 140 MMHG

## 2019-04-06 DIAGNOSIS — G90.A POTS (POSTURAL ORTHOSTATIC TACHYCARDIA SYNDROME): ICD-10-CM

## 2019-04-06 DIAGNOSIS — R55 NEAR SYNCOPE: ICD-10-CM

## 2019-04-06 LAB
ABO + RH BLD: NORMAL
ABO + RH BLD: NORMAL
ANION GAP SERPL CALCULATED.3IONS-SCNC: 5 MMOL/L (ref 3–14)
BASOPHILS # BLD AUTO: 0.1 10E9/L (ref 0–0.2)
BASOPHILS NFR BLD AUTO: 0.8 %
BLD GP AB SCN SERPL QL: NORMAL
BLOOD BANK CMNT PATIENT-IMP: NORMAL
BUN SERPL-MCNC: 17 MG/DL (ref 7–30)
CALCIUM SERPL-MCNC: 9 MG/DL (ref 8.5–10.1)
CHLORIDE SERPL-SCNC: 106 MMOL/L (ref 94–109)
CO2 SERPL-SCNC: 27 MMOL/L (ref 20–32)
CREAT SERPL-MCNC: 0.67 MG/DL (ref 0.52–1.04)
DIFFERENTIAL METHOD BLD: NORMAL
EOSINOPHIL # BLD AUTO: 0.1 10E9/L (ref 0–0.7)
EOSINOPHIL NFR BLD AUTO: 1.4 %
ERYTHROCYTE [DISTWIDTH] IN BLOOD BY AUTOMATED COUNT: 12 % (ref 10–15)
GFR SERPL CREATININE-BSD FRML MDRD: >90 ML/MIN/{1.73_M2}
GLUCOSE SERPL-MCNC: 98 MG/DL (ref 70–99)
HCG SERPL QL: NEGATIVE
HCT VFR BLD AUTO: 36.8 % (ref 35–47)
HGB BLD-MCNC: 12.4 G/DL (ref 11.7–15.7)
IMM GRANULOCYTES # BLD: 0.1 10E9/L (ref 0–0.4)
IMM GRANULOCYTES NFR BLD: 0.8 %
INR PPP: 0.9 (ref 0.86–1.14)
LYMPHOCYTES # BLD AUTO: 2 10E9/L (ref 0.8–5.3)
LYMPHOCYTES NFR BLD AUTO: 31.3 %
MCH RBC QN AUTO: 30.5 PG (ref 26.5–33)
MCHC RBC AUTO-ENTMCNC: 33.7 G/DL (ref 31.5–36.5)
MCV RBC AUTO: 91 FL (ref 78–100)
MONOCYTES # BLD AUTO: 0.5 10E9/L (ref 0–1.3)
MONOCYTES NFR BLD AUTO: 7.3 %
NEUTROPHILS # BLD AUTO: 3.7 10E9/L (ref 1.6–8.3)
NEUTROPHILS NFR BLD AUTO: 58.4 %
NRBC # BLD AUTO: 0 10*3/UL
NRBC BLD AUTO-RTO: 0 /100
PLATELET # BLD AUTO: 241 10E9/L (ref 150–450)
POTASSIUM SERPL-SCNC: 3.9 MMOL/L (ref 3.4–5.3)
RBC # BLD AUTO: 4.06 10E12/L (ref 3.8–5.2)
SODIUM SERPL-SCNC: 138 MMOL/L (ref 133–144)
SPECIMEN EXP DATE BLD: NORMAL
WBC # BLD AUTO: 6.4 10E9/L (ref 4–11)

## 2019-04-06 PROCEDURE — 84703 CHORIONIC GONADOTROPIN ASSAY: CPT | Performed by: EMERGENCY MEDICINE

## 2019-04-06 PROCEDURE — 85025 COMPLETE CBC W/AUTO DIFF WBC: CPT | Performed by: EMERGENCY MEDICINE

## 2019-04-06 PROCEDURE — 85610 PROTHROMBIN TIME: CPT | Performed by: EMERGENCY MEDICINE

## 2019-04-06 PROCEDURE — 99284 EMERGENCY DEPT VISIT MOD MDM: CPT | Mod: 25

## 2019-04-06 PROCEDURE — 80048 BASIC METABOLIC PNL TOTAL CA: CPT | Performed by: EMERGENCY MEDICINE

## 2019-04-06 PROCEDURE — 86901 BLOOD TYPING SEROLOGIC RH(D): CPT | Performed by: EMERGENCY MEDICINE

## 2019-04-06 PROCEDURE — 25000128 H RX IP 250 OP 636: Performed by: EMERGENCY MEDICINE

## 2019-04-06 PROCEDURE — 96360 HYDRATION IV INFUSION INIT: CPT

## 2019-04-06 PROCEDURE — 96361 HYDRATE IV INFUSION ADD-ON: CPT

## 2019-04-06 PROCEDURE — 86850 RBC ANTIBODY SCREEN: CPT | Performed by: EMERGENCY MEDICINE

## 2019-04-06 PROCEDURE — 86900 BLOOD TYPING SEROLOGIC ABO: CPT | Performed by: EMERGENCY MEDICINE

## 2019-04-06 RX ORDER — SODIUM CHLORIDE 9 MG/ML
1000 INJECTION, SOLUTION INTRAVENOUS CONTINUOUS
Status: DISCONTINUED | OUTPATIENT
Start: 2019-04-06 | End: 2019-04-06 | Stop reason: HOSPADM

## 2019-04-06 RX ORDER — HYDROXYZINE PAMOATE 25 MG/1
CAPSULE ORAL
Refills: 0 | COMMUNITY
Start: 2018-12-17

## 2019-04-06 RX ORDER — DICYCLOMINE HYDROCHLORIDE 10 MG/1
CAPSULE ORAL
Refills: 2 | COMMUNITY
Start: 2018-12-17

## 2019-04-06 RX ORDER — ATORVASTATIN CALCIUM 10 MG/1
10 TABLET, FILM COATED ORAL
COMMUNITY
Start: 2018-05-21

## 2019-04-06 RX ADMIN — SODIUM CHLORIDE 1000 ML: 9 INJECTION, SOLUTION INTRAVENOUS at 15:58

## 2019-04-06 RX ADMIN — SODIUM CHLORIDE 1000 ML: 9 INJECTION, SOLUTION INTRAVENOUS at 14:19

## 2019-04-06 ASSESSMENT — ENCOUNTER SYMPTOMS
VOMITING: 0
NAUSEA: 0
CHILLS: 0
FEVER: 0
LIGHT-HEADEDNESS: 1
DYSURIA: 0

## 2019-04-06 NOTE — ED PROVIDER NOTES
History     Chief Complaint:  Syncope    HPI   Maritza Hanson is a 43 year old female with a history of POTS and anemia who presents to the emergency department today for evaluation of syncope. The patient reports she has been feeling faint since Wednesday, 4/3/2019, when she had hysteroscopy. During this procedure, she states they lacerated her cervix in several. She states she almost passed out today which she states is significant because she has a history of POTS. She denies dysuria, fevers, chills, frequency, nausea, or vomiting.      Allergies:  Hydrocodone  Sulfa Drugs  Zofran [Ondansetron]      Medications:    Cetirizine HCl (ZYRTEC ALLERGY PO)  EPINEPHrine (EPIPEN/ADRENACLICK/OR ANY BX GENERIC EQUIV)  Esomeprazole Magnesium (NEXIUM PO)  ferrous sulfate (IRON) 325 (65 FE)   gabapentin (NEURONTIN)   LORazepam (ATIVAN)   metoclopramide (REGLAN)   MONTELUKAST   NORTRIPTYLINE   vitamin D (ERGOCALCIFEROL)    Past Medical History:    Anemia   Asthma   Back pain   Depression with anxiety   History of tics   Mild preeclampsia   POTS (postural orthostatic tachycardia syndrome)    Past Surgical History:    Cholecystectomy  Removal of sesamoid bone right foot  [place] teeth    Family History:    Mother: breast cancer, thyroid disease, psychotic disorder, obesity  Maternal grandmother: lung cancer  Maternal grandfather: cerebrovascular disease, diabetes  Paternal grandmother: breast cancer  Father: diabetes,obesity, psychotic disorder, hearing loss, colon cancer    Social History:  Smoking Status: Never Smoker  Smokeless Tobacco: Never Used  Alcohol Use: Positive   Marital Status:        Review of Systems   Constitutional: Negative for chills and fever.   Gastrointestinal: Negative for nausea and vomiting.   Genitourinary: Negative for dysuria.   Neurological: Positive for syncope and light-headedness.   All other systems reviewed and are negative.        Physical Exam     Patient Vitals for the past 24  hrs:   BP Temp Temp src Pulse Resp SpO2   04/06/19 1322 140/90 99  F (37.2  C) Oral 103 16 97 %      Physical Exam  Constitutional: Well developed, nontox appearance  Head: Atraumatic.   Mouth/Throat: Oropharynx is clear and moist.   Neck:  no stridor  Eyes: no scleral icterus  Cardiovascular: RRR, 2+ bilat radial pulses  Pulmonary/Chest: nml resp effort, Clear BS bilat  Abdominal: ND, +BS, soft, NT, no rebound or guarding   : no CVA tenderness bilat  Ext: Warm, well perfused, no edema  Neurological: A&O, symmetric facies, moves ext x4  Skin: Skin is warm and dry.   Psychiatric: Behavior is normal. Thought content normal.   Nursing note and vitals reviewed.        Emergency Department Course     Laboratory:  Laboratory findings were communicated with the patient who voiced understanding of the findings.  Labs Ordered and Resulted from Time of ED Arrival Up to the Time of Departure from the ED   CBC WITH PLATELETS DIFFERENTIAL   BASIC METABOLIC PANEL   INR   HCG QUALITATIVE   ABO/RH TYPE AND SCREEN       Interventions:  1419 NS 1000 mL IV    Emergency Department Course:    ED Course as of Apr 06 1608   Sat Apr 06, 2019   1346 I performed a thorough exam of the patient.     1401 IV was inserted and blood was drawn for laboratory testing, results above.        1530 I personally reviewed the laboratory  esults with the patient and answered all related questions prior to discharge.     Impression & Plan      Medical Decision Making:  Maritza Hanson is a 43 year old female who presents to the emergency department today for evaluation of near syncope    Differential diagnosis includes POTS, electrolyte abnormality, anemia, dehydration.  Labs as noted above and unremarkable.  The patient was given fluids in the emergency department with improvement in her vital signs.  She ambulated in the emergency department without difficulty.  Patient reports that she has had many similar episodes secondary to her POTS.  Doubt  cardiac arrhythmia, pregnancy, ectopic pregnancy.  At this time I feel the patient is safe for discharge.  Recommendations given regarding follow up with PCP and return to the emergency department as needed for new or worsening symptoms.  Counseled on all results, diagnosis and disposition.  Pt understanding and agreeable to plan. Patient discharged in stable condition.        Diagnosis:      ICD-10-CM    1. POTS (postural orthostatic tachycardia syndrome) R00.0     I95.1    2. Near syncope R55      Disposition:   The patient is discharged to home.     Discharge Medications:  None    Scribe Disclosure:  I, Wilma Burleson, am serving as a scribe at 1:47 PM on 4/6/2019 to document services personally performed by Snug Mendiola MD based on my observations and the provider's statements to me.      [facility] EMERGENCY DEPARTMENT       Sung Mendiola MD  04/07/19 0005

## 2019-04-06 NOTE — ED AVS SNAPSHOT
[facility] Emergency Department  [address]  [facility] [address]  Phone:  [phone]  Fax:  [phone]                                    Maritza Hanson   MRN: [id number]    Department:  [facility] Emergency Department   Date of Visit:  4/6/2019           After Visit Summary Signature Page    I have received my discharge instructions, and my questions have been answered. I have discussed any challenges I see with this plan with the nurse or doctor.    ..........................................................................................................................................  Patient/Patient Representative Signature      ..........................................................................................................................................  Patient Representative Print Name and Relationship to Patient    ..................................................               ................................................  Date                                   Time    ..........................................................................................................................................  Reviewed by Signature/Title    ...................................................              ..............................................  Date                                               Time          22EPIC Rev 08/18

## 2019-04-06 NOTE — DISCHARGE INSTRUCTIONS
Please drink plenty of fluids at home.    Please follow-up with your primary cardiologist or primary physician on Monday.    Please return to the emergency department as needed for new or worsening symptoms including vomiting and unable to keep anything down, fainting, chest pain, shortness of breath, any other concerning symptoms.

## 2019-04-06 NOTE — ED TRIAGE NOTES
Pt presents with near syncope episode today while shopping. Pt with history of POTS and anemia. Had hystoscopy on Wednesday. Pt reports some blood loss during procedure and was picking up iron supplement today. Pt reports feeling lightheaded, HR up to 150, patient able to get self to ground and symptoms improved.

## 2019-05-20 ENCOUNTER — PATIENT OUTREACH (OUTPATIENT)
Dept: ONCOLOGY | Facility: CLINIC | Age: 44
End: 2019-05-20

## 2019-05-23 DIAGNOSIS — F41.9 ANXIETY: Primary | ICD-10-CM

## 2019-05-23 RX ORDER — LORAZEPAM 1 MG/1
0.5 TABLET ORAL EVERY 8 HOURS PRN
Qty: 2 TABLET | Refills: 0 | Status: SHIPPED | OUTPATIENT
Start: 2019-05-23

## 2019-05-23 RX ORDER — LORAZEPAM 1 MG/1
1 TABLET ORAL ONCE
Refills: 0 | COMMUNITY
Start: 2018-12-13 | End: 2023-09-07

## 2019-05-31 ENCOUNTER — HOSPITAL ENCOUNTER (OUTPATIENT)
Dept: MRI IMAGING | Facility: CLINIC | Age: 44
Discharge: HOME | End: 2019-05-31
Attending: CLINICAL NURSE SPECIALIST | Admitting: CLINICAL NURSE SPECIALIST
Payer: COMMERCIAL

## 2019-05-31 DIAGNOSIS — R92.8 ABNORMAL MRI, BREAST: ICD-10-CM

## 2019-05-31 PROCEDURE — 77049 MRI BREAST C-+ W/CAD BI: CPT

## 2019-05-31 PROCEDURE — A9585 GADOBUTROL INJECTION: HCPCS | Performed by: CLINICAL NURSE SPECIALIST

## 2019-05-31 PROCEDURE — 25500064 ZZH RX 255 OP 636: Performed by: CLINICAL NURSE SPECIALIST

## 2019-05-31 RX ORDER — GADOBUTROL 604.72 MG/ML
10 INJECTION INTRAVENOUS ONCE
Status: COMPLETED | OUTPATIENT
Start: 2019-05-31 | End: 2019-05-31

## 2019-05-31 RX ORDER — GADOBUTROL 604.72 MG/ML
10 INJECTION INTRAVENOUS ONCE
Status: DISCONTINUED | OUTPATIENT
Start: 2019-05-31 | End: 2019-05-31 | Stop reason: CLARIF

## 2019-05-31 RX ADMIN — GADOBUTROL 9 ML: 604.72 INJECTION INTRAVENOUS at 12:01

## 2019-12-12 NOTE — PROGRESS NOTES
Oncology Risk Management Consultation:  Date on this visit: 2019    Maritza Hanson returns to the clinic today for a six month follow up. She requires high risk screening and surveillance for her risk of cancer secondary to having a family history of breast cancer in her mother at age 52,  maternal aunt in her 40s and paternal grandmother in her 40s.     Primary Physician: Ollie Loera PA-C    History Of Present Illness:  Ms. Hanson is a very pleasant  44 year old female who presents with family history of breast cancer.     Genetic Testin2015 - negative for genetic mutations in 25 genes using a My Risk panel through [employer]. Maritza was found to be negative for genetic mutations in: APC, MATTHEW, BARD1, BMPR1A, BRCA1, BRCA2, BRIP1, CDH1, CDK4, CDKN2A, CHEK2, EPCAM (large rearrangement only), MLH1, MSH2, MSH6, MUTYH, NBN, PALB2, PMS2, PTEN, RAD51C, RAD51D, SMAD4, STK11, and TP53 genes. No mutations were found in any of the 25 genes analyzed. This test involved sequencing and deletion/duplication analysis of all genes with the exception of EPCAM (deletions only).     Pertinent history:  Menarche at age 14.  First child at age 34.   Premenopausal.  Ovaries and uterus intact.Hx of tubal ligation.  LMP: 2019  Density: scattered fibroglandular densities  History of breastfeeding both of her sons, for 6 months and 10 months, respectively.   History of OCPs 10 years and used one dose of Depo-Provera; she was unable to tolerate the medication, as it heightened her anxiety.  No history of HRT  2018- History of MRI guided R core biopsy, normal breast tissue on pathology.  No history of hyperplasia, atypia or malignancy.    Breast imaging history:  Breast MRI and mammogram in  from [place]. No records available; she states they were normal.  2015 -Screening mammogram, BiRads 0 for asymmetry in R breast  2015 - Diagnostic mammogram, R breast - BiRads1, fibroglandular  asymmetry in upper R breast  2016 - Breast MRI, BiRads1  2016 - Screening tomosynthesis mammogram, BiRads1.  2017 - Breast MRI, BiRads1  2018-Screening mammogram, BiRads1.  2018 - Breast MRI, BiRads0 for Nonmasslike area of  enhancement in the deep right breast at about 12:00, 10 cm from the nipple. The area measures 2.9 x 2.1 x 2.0 cm.   2018 - Diagnostic tomosynthesis mammogram and right US, both BiRads4 suspicious.  2018-  MRI guided R core biopsy, normal breast tissue on pathology.  2019 - Breast MRI, BiRads2.    Other cancer screenin2016 - Colonoscopy for family history of colon cancer in father at age 65, paternal cousin with colon cancer in 50s. And paternal uncle with 10+ colon polyps. Results: 7 hyperplastic plastic polyps removed (2 in ascending and 5 in sigmoid), and 1 tubular adenoma removed from descending colon.     2019 - Colonoscopy, 2 mm tubular adenoma in cecum, 2 mm tubular adenoma in ascending colon, 2 mm hyperplastic polyp in sigmoid colon. Mild patchy erythema with a few small linear erosions in distal rectum, suggestive of mechanical injury. Next due:     At this visit, she denies new fatigue, breast pain, asymmetry, lumps, masses, thickening, pain, nipple discharge and skin changes in her breasts.     Past Medical/Surgical History:  Past Medical History:   Diagnosis Date     Anemia during pregnancy and now     Asthma      Back pain      Depression with anxiety since teenage     History of tics      Mild preeclampsia     during both pregnancy, resolved     POTS (postural orthostatic tachycardia syndrome)      Past Surgical History:   Procedure Laterality Date     CHOLECYSTECTOMY, LAPOROSCOPIC  2017     removal of sesimoid bone right foot        wisdom teeth extraction         Allergies:  Allergies as of 2019 - Reviewed 2019   Allergen Reaction Noted     Dogs  2019     Grass  2019     Hydrocodone  Itching 11/13/2012     Mold  12/13/2019     Sulfa drugs Hives 11/13/2012     Trees  12/13/2019     Zofran [ondansetron] Itching 05/16/2018       Current Medications:  Current Outpatient Medications   Medication Sig Dispense Refill     LORazepam (ATIVAN) 1 MG tablet Take 30 minutes prior to departure.  Do not operate a vehicle after taking this medication 2 tablet 0     atorvastatin (LIPITOR) 10 MG tablet Take 10 mg by mouth       Cetirizine HCl (ZYRTEC ALLERGY PO) Take 10 mg by mouth 2 times daily       dicyclomine (BENTYL) 10 MG capsule TK 1-2 CS PO QID PRF ABDOMINAL PAIN  2     EPINEPHrine (EPIPEN/ADRENACLICK/OR ANY BX GENERIC EQUIV) 0.3 MG/0.3ML injection 2-pack 1 mg  0     Esomeprazole Magnesium (NEXIUM PO)        gabapentin (NEURONTIN) 300 MG capsule Take 3 capsules by mouth       hydrOXYzine (VISTARIL) 25 MG capsule TK 1 TO 2 CS PO Q 4 TO 6 HOURS PRN  0     hyoscyamine (LEVSIN/SL) 0.125 MG sublingual tablet TK 1 T PO SUBLINGUAL ROUTE QID  3     LINZESS 145 MCG capsule TK 1 C PO D ON AN EMPTY STOMACH AT LEAST 30 BEFORE 1ST MEAL OF THE DAY  3     LORazepam (ATIVAN) 1 MG tablet Take 1 mg by mouth once  0     LORazepam (ATIVAN) 1 MG tablet Take 0.5 tablets (0.5 mg) by mouth every 8 hours as needed for anxiety (prior to breast MRI) Take 30 minutes prior to departure.  Do not operate a vehicle after taking this medication 2 tablet 0     metoclopramide (REGLAN) 5 MG tablet Take 1 tablet (5 mg) by mouth 3 times daily as needed 30 tablet 0     MONTELUKAST SODIUM PO Take 10 mg by mouth At Bedtime       NORTRIPTYLINE HCL PO Take 40 mg by mouth       prochlorperazine (COMPAZINE) 25 MG suppository UNW AND I 1 SUP REC BID PRN  0     promethazine (PHENERGAN) 12.5 MG tablet TK 1/2 TO 1 T PO BID PRF NAUSEA  2     SUMAtriptan (IMITREX) 20 MG/ACT nasal spray   2     vitamin D (ERGOCALCIFEROL) 92725 UNIT capsule TK 1 C PO 1 TIME WEEKLY  0        Family History:  Family History   Problem Relation Age of Onset     Breast Cancer  Mother 52         at 52     Thyroid Disease Mother      Psychotic Disorder Mother      Obesity Mother      Lung Cancer Maternal Grandmother          at 70     Cerebrovascular Disease Maternal Grandfather      Diabetes Maternal Grandfather      Breast Cancer Paternal Grandmother 40        recurrence in 70s/recurrence in 70s,  at 70     Diabetes Father      Obesity Father      Psychotic Disorder Father      Hearing Loss Father      Colon Cancer Father 65     Breast Cancer Maternal Aunt 40         in 70s     Psychotic Disorder Sister      Colon Polyps Paternal Uncle         more than 10 colon polyps     Colon Cancer Cousin 50        maternal cousin     Cancer Paternal Aunt         unknown,  in 70s after reaction to chemo     Cancer Maternal Aunt 51        brain or bone cancer       Social History:  Social History     Socioeconomic History     Marital status:      Spouse name: Sukumar     Number of children: 2     Years of education: Not on file     Highest education level: Not on file   Occupational History     Employer: [employer]   Social Needs     Financial resource strain: Not on file     Food insecurity:     Worry: Not on file     Inability: Not on file     Transportation needs:     Medical: Not on file     Non-medical: Not on file   Tobacco Use     Smoking status: Never Smoker     Smokeless tobacco: Never Used   Substance and Sexual Activity     Alcohol use: Yes     Alcohol/week: 0.0 standard drinks     Comment: very rarely     Drug use: No     Sexual activity: Yes     Partners: Male     Birth control/protection: Condom   Lifestyle     Physical activity:     Days per week: Not on file     Minutes per session: Not on file     Stress: Not on file   Relationships     Social connections:     Talks on phone: Not on file     Gets together: Not on file     Attends Hoahaoism service: Not on file     Active member of club or organization: Not on file     Attends meetings of clubs or  organizations: Not on file     Relationship status: Not on file     Intimate partner violence:     Fear of current or ex partner: Not on file     Emotionally abused: Not on file     Physically abused: Not on file     Forced sexual activity: Not on file   Other Topics Concern     Parent/sibling w/ CABG, MI or angioplasty before 65F 55M? No      Service No     Blood Transfusions No     Caffeine Concern No     Occupational Exposure No     Hobby Hazards No     Sleep Concern No     Stress Concern Yes     Weight Concern Yes     Comment: would like to lose weight     Special Diet Yes     Comment: elimination dairy     Back Care Yes     Exercise No     Bike Helmet Not Asked     Comment: NA, don't ride     Seat Belt Yes     Self-Exams No   Social History Narrative     Not on file       Physical Exam:  /74   Pulse 93   Temp 97.9  F (36.6  C) (Oral)   Resp 13   Wt 91.2 kg (201 lb)   LMP 12/04/2019 (Approximate)   SpO2 98%   BMI 30.56 kg/m      GENERAL APPEARANCE: healthy, alert and no distress     HENT: Mouth without ulcers or lesions     NECK: no adenopathy, no asymmetry or masses     LYMPHATICS: No cervical, supraclavicular or, axillary  lymphadenopathy     RESP: lungs clear to auscultation - no rales, rhonchi or wheezes     CARDIOVASCULAR: regular rate and rhythm, normal S1 S2, no S3 or S4 and no murmur.   BREAST: A multi positional, bilateral breast exam was performed.  Fairly symmetrical. Right breast: no palpable dominant masses, no nipple discharge, no skin changes. Soft tissue.  Right axilla: no palpable adenopathy. Left breast: no palpable dominant masses, no nipple discharge, no skin changes. Left axilla: no palpable adenopathy. Minor fibrocystic changes in superior portion between 11-1 o'clock. Soft  tissue.        SKIN: no suspicious lesions or rashes on anterior torso, upper extremities or face.    Laboratory/Imaging Studies  No results found for any visits on 12/13/19.    ASSESSMENT  We  discussed her last screening tests and reviewed results.  We reviewed her interval history; she has been doing well overall. She states that she has been working on creating more personal time for herself, has joined the [employer] and is practicing swimming. She is also making sure that she gets to bed every night at midnight and is doing better with sleep.  She has intentionally lost 10 pounds since 5/1/2018 by increasing her exercise.  She is planning to stick with this plan and intends to bring her weight down more by our next visit.    There are no changes to her family history; she is pleased that her son's mood swings and behavior are better now that he is on ADHD medication and she is wanting to continue to improve her own health.    Her exam today is unremarkable and she will proceed to have a screening mammogram after this visit.      INDIVIDUALIZED CANCER RISK MANAGEMENT PLAN:  Individualized Surveillance Plan for women  With 20% or greater lifetime risk of breast cancer   Per NCCN Breast Cancer Screening and Diagnosis Guidelines Version 2.2018    Recommended screening Test or procedure Last done Next Scheduled    Clinical encounter Ongoing risk assessment, risk reduction counseling and clinical breast exam, every 6-12 months. Refer to genetic counseling if not already done.   12/13/2019 December 2020   However, some family histories with breast cancers at a very young age, may warrant screening starting earlier.    *May begin at age 40 if breast cancers in the family occur at later ages.    Annual mammogram beginning 10 years younger than the earliest breast cancer in the family but not prior to age 30.    Recommend annual breast MRI to begin 10 years younger than the earliest breast cancer in the family but not prior to age 25.    Breast MRIs are preferably done on day 7-15 of the menstrual cycle in premenopausal women. 12/28/2018-  MRI guided R core biopsy, normal breast tissue on pathology.    5/31/2019 -  Breast MRI, BiRads2. Mammogram 12/18/2019 at [facility]    Breast MRI in June 2020    Next exam: December 2020 followed by tomosynthesis mammogram   Breast screening for patients at high risk due to thoracic radiation before 30 years old    Begin 10 years post radiation treatment or age 25.   Annual mammogram beginning 10 years after radiation but not prior to age 30    Annual breast MRI, preferably on day 7-15 of menstrual cycle for premenopausal women.       NA     NA   Women who have a lifetime risk of >20% based on history of LCIS or ADH/ALH Annual screening mammogram beginning at age of LCIS or ADH/ALH but not prior to age 30.    Consider annual MRI to begin at age of diagnosis of LCIS or ADH/ALH but not prior to age 25.    Consider risk reducing strategies.     NA     NA    Recommend risk reducing strategies for women with 1.7% 5 year risk of breast cancer.         I spent 25 minutes with the patient with greater that 50% of it in counseling and coordinating care as documented above.    Elena Aranda, JENNIFER-CNS, OCN, AGN-BC  Clinical Nurse Specialist  Cancer Risk Management Program  [facility] [address]  [place] [address]    phone:  [phone]  Pager: [phone]  fax: [phone]    CC: MD Toya Montes APRN-DEBI Carmona MD

## 2019-12-13 ENCOUNTER — ONCOLOGY VISIT (OUTPATIENT)
Dept: ONCOLOGY | Facility: CLINIC | Age: 44
End: 2019-12-13
Attending: CLINICAL NURSE SPECIALIST
Payer: COMMERCIAL

## 2019-12-13 VITALS
WEIGHT: 201 LBS | SYSTOLIC BLOOD PRESSURE: 115 MMHG | HEART RATE: 93 BPM | DIASTOLIC BLOOD PRESSURE: 74 MMHG | RESPIRATION RATE: 13 BRPM | BODY MASS INDEX: 30.56 KG/M2 | TEMPERATURE: 97.9 F | OXYGEN SATURATION: 98 %

## 2019-12-13 DIAGNOSIS — F41.9 ANXIETY: Primary | ICD-10-CM

## 2019-12-13 DIAGNOSIS — Z12.39 BREAST CANCER SCREENING, HIGH RISK PATIENT: ICD-10-CM

## 2019-12-13 PROBLEM — N93.9 ABNORMAL UTERINE BLEEDING (AUB): Status: ACTIVE | Noted: 2019-03-18

## 2019-12-13 PROBLEM — N84.0 UTERINE POLYP: Status: ACTIVE | Noted: 2019-03-18

## 2019-12-13 PROCEDURE — 40000114 ZZH STATISTIC NO CHARGE CLINIC VISIT

## 2019-12-13 PROCEDURE — 99214 OFFICE O/P EST MOD 30 MIN: CPT | Mod: ZP | Performed by: CLINICAL NURSE SPECIALIST

## 2019-12-13 RX ORDER — PROCHLORPERAZINE 25 MG
SUPPOSITORY, RECTAL RECTAL
Refills: 0 | COMMUNITY
Start: 2019-10-10

## 2019-12-13 RX ORDER — LINACLOTIDE 145 UG/1
CAPSULE, GELATIN COATED ORAL
Refills: 3 | COMMUNITY
Start: 2019-11-19

## 2019-12-13 RX ORDER — LORAZEPAM 1 MG/1
TABLET ORAL
Qty: 2 TABLET | Refills: 0 | Status: SHIPPED | OUTPATIENT
Start: 2019-12-13 | End: 2022-05-06

## 2019-12-13 RX ORDER — SUMATRIPTAN 20 MG/1
SPRAY NASAL
Refills: 2 | COMMUNITY
Start: 2019-10-09

## 2019-12-13 RX ORDER — LORAZEPAM 1 MG/1
TABLET ORAL
Qty: 2 TABLET | Refills: 0 | Status: SHIPPED | OUTPATIENT
Start: 2019-12-13 | End: 2019-12-13

## 2019-12-13 RX ORDER — PROMETHAZINE HYDROCHLORIDE 12.5 MG/1
TABLET ORAL
Refills: 2 | COMMUNITY
Start: 2019-10-10

## 2019-12-13 ASSESSMENT — PAIN SCALES - GENERAL: PAINLEVEL: NO PAIN (0)

## 2019-12-13 NOTE — PATIENT INSTRUCTIONS
Individualized Surveillance Plan for women  With 20% or greater lifetime risk of breast cancer   Per NCCN Breast Cancer Screening and Diagnosis Guidelines Version 2.2018    Recommended screening Test or procedure Last done Next Scheduled    Clinical encounter Ongoing risk assessment, risk reduction counseling and clinical breast exam, every 6-12 months. Refer to genetic counseling if not already done.   12/13/2019 December 2020   However, some family histories with breast cancers at a very young age, may warrant screening starting earlier.    *May begin at age 40 if breast cancers in the family occur at later ages.    Annual mammogram beginning 10 years younger than the earliest breast cancer in the family but not prior to age 30.    Recommend annual breast MRI to begin 10 years younger than the earliest breast cancer in the family but not prior to age 25.    Breast MRIs are preferably done on day 7-15 of the menstrual cycle in premenopausal women. 12/28/2018-  MRI guided R core biopsy, normal breast tissue on pathology.    5/31/2019 - Breast MRI, BiRads2. Mammogram today on December 18    Breast MRI in June 2020    Next exam: December 2020 followed by tomosynthesis mammogram   Breast screening for patients at high risk due to thoracic radiation before 30 years old    Begin 10 years post radiation treatment or age 25.   Annual mammogram beginning 10 years after radiation but not prior to age 30    Annual breast MRI, preferably on day 7-15 of menstrual cycle for premenopausal women.       NA     NA   Women who have a lifetime risk of >20% based on history of LCIS or ADH/ALH Annual screening mammogram beginning at age of LCIS or ADH/ALH but not prior to age 30.    Consider annual MRI to begin at age of diagnosis of LCIS or ADH/ALH but not prior to age 25.    Consider risk reducing strategies.     NA     NA    Recommend risk reducing strategies for women with 1.7% 5 year risk of breast cancer.

## 2019-12-13 NOTE — LETTER
Cancer Risk Management  Program Locations    [facility]  [facility]  [facility]  [facility]  Mailing Address  Cancer Risk Management Program  [facility]  [address]  [place] [address]    New patient appointments  [phone]  2019    Maritza Hanson  [address] [id number]      Dear Maritza,    It was a pleasure meeting with you today.  Below is a copy of my note from our visit, outlining your surveillance plan.      I look forward to seeing you in the future to coordinate your care and reduce your health risks. Please feel free to contact me if you have any questions or concerns.      Oncology Risk Management Consultation:  Date on this visit: 2019    Maritza Hanson returns to the clinic today for a six month follow up. She requires high risk screening and surveillance for her risk of cancer secondary to having a family history of breast cancer in her mother at age 52,  maternal aunt in her 40s and paternal grandmother in her 40s.     Primary Physician: Ollie Loera PA-C    History Of Present Illness:  Ms. Hanson is a very pleasant  44 year old female who presents with family history of breast cancer.     Genetic Testin2015 - negative for genetic mutations in 25 genes using a My Risk panel through [employer]. Maritza was found to be negative for genetic mutations in: APC, MATTHEW, BARD1, BMPR1A, BRCA1, BRCA2, BRIP1, CDH1, CDK4, CDKN2A, CHEK2, EPCAM (large rearrangement only), MLH1, MSH2, MSH6, MUTYH, NBN, PALB2, PMS2, PTEN, RAD51C, RAD51D, SMAD4, STK11, and TP53 genes. No mutations were found in any of the 25 genes analyzed. This test involved sequencing and deletion/duplication analysis of all genes with the exception of EPCAM (deletions only).     Pertinent  history:  Menarche at age 14.  First child at age 34.   Premenopausal.  Ovaries and uterus intact.Hx of tubal ligation.  LMP: 2019  Density: scattered fibroglandular densities  History of breastfeeding both of her sons, for 6 months and 10 months, respectively.   History of OCPs 10 years and used one dose of Depo-Provera; she was unable to tolerate the medication, as it heightened her anxiety.  No history of HRT  2018- History of MRI guided R core biopsy, normal breast tissue on pathology.  No history of hyperplasia, atypia or malignancy.    Breast imaging history:  Breast MRI and mammogram in  from [place]. No records available; she states they were normal.  2015 -Screening mammogram, BiRads 0 for asymmetry in R breast  2015 - Diagnostic mammogram, R breast - BiRads1, fibroglandular asymmetry in upper R breast  2016 - Breast MRI, BiRads1  2016 - Screening tomosynthesis mammogram, BiRads1.  2017 - Breast MRI, BiRads1  2018-Screening mammogram, BiRads1.  2018 - Breast MRI, BiRads0 for Nonmasslike area of  enhancement in the deep right breast at about 12:00, 10 cm from the nipple. The area measures 2.9 x 2.1 x 2.0 cm.   2018 - Diagnostic tomosynthesis mammogram and right US, both BiRads4 suspicious.  2018-  MRI guided R core biopsy, normal breast tissue on pathology.  2019 - Breast MRI, BiRads2.    Other cancer screenin2016 - Colonoscopy for family history of colon cancer in father at age 65, paternal cousin with colon cancer in 50s. And paternal uncle with 10+ colon polyps. Results: 7 hyperplastic plastic polyps removed (2 in ascending and 5 in sigmoid), and 1 tubular adenoma removed from descending colon.     2019 - Colonoscopy, 2 mm tubular adenoma in cecum, 2 mm tubular adenoma in ascending colon, 2 mm hyperplastic polyp in sigmoid colon. Mild patchy erythema with a few small linear erosions in distal rectum, suggestive of mechanical  injury. Next due: 2023    At this visit, she denies new fatigue, breast pain, asymmetry, lumps, masses, thickening, pain, nipple discharge and skin changes in her breasts.     Past Medical/Surgical History:  Past Medical History:   Diagnosis Date     Anemia during pregnancy and now     Asthma      Back pain      Depression with anxiety since teenage     History of tics      Mild preeclampsia     during both pregnancy, resolved     POTS (postural orthostatic tachycardia syndrome)      Past Surgical History:   Procedure Laterality Date     CHOLECYSTECTOMY, LAPOROSCOPIC  12/2017     removal of sesimoid bone right foot   1994     wisdom teeth extraction  1998       Allergies:  Allergies as of 12/13/2019 - Reviewed 12/13/2019   Allergen Reaction Noted     Dogs  12/13/2019     Grass  12/13/2019     Hydrocodone Itching 11/13/2012     Mold  12/13/2019     Sulfa drugs Hives 11/13/2012     Trees  12/13/2019     Zofran [ondansetron] Itching 05/16/2018       Current Medications:  Current Outpatient Medications   Medication Sig Dispense Refill     LORazepam (ATIVAN) 1 MG tablet Take 30 minutes prior to departure.  Do not operate a vehicle after taking this medication 2 tablet 0     atorvastatin (LIPITOR) 10 MG tablet Take 10 mg by mouth       Cetirizine HCl (ZYRTEC ALLERGY PO) Take 10 mg by mouth 2 times daily       dicyclomine (BENTYL) 10 MG capsule TK 1-2 CS PO QID PRF ABDOMINAL PAIN  2     EPINEPHrine (EPIPEN/ADRENACLICK/OR ANY BX GENERIC EQUIV) 0.3 MG/0.3ML injection 2-pack 1 mg  0     Esomeprazole Magnesium (NEXIUM PO)        gabapentin (NEURONTIN) 300 MG capsule Take 3 capsules by mouth       hydrOXYzine (VISTARIL) 25 MG capsule TK 1 TO 2 CS PO Q 4 TO 6 HOURS PRN  0     hyoscyamine (LEVSIN/SL) 0.125 MG sublingual tablet TK 1 T PO SUBLINGUAL ROUTE QID  3     LINZESS 145 MCG capsule TK 1 C PO D ON AN EMPTY STOMACH AT LEAST 30 BEFORE 1ST MEAL OF THE DAY  3     LORazepam (ATIVAN) 1 MG tablet Take 1 mg by mouth once  0      LORazepam (ATIVAN) 1 MG tablet Take 0.5 tablets (0.5 mg) by mouth every 8 hours as needed for anxiety (prior to breast MRI) Take 30 minutes prior to departure.  Do not operate a vehicle after taking this medication 2 tablet 0     metoclopramide (REGLAN) 5 MG tablet Take 1 tablet (5 mg) by mouth 3 times daily as needed 30 tablet 0     MONTELUKAST SODIUM PO Take 10 mg by mouth At Bedtime       NORTRIPTYLINE HCL PO Take 40 mg by mouth       prochlorperazine (COMPAZINE) 25 MG suppository UNW AND I 1 SUP REC BID PRN  0     promethazine (PHENERGAN) 12.5 MG tablet TK 1/2 TO 1 T PO BID PRF NAUSEA  2     SUMAtriptan (IMITREX) 20 MG/ACT nasal spray   2     vitamin D (ERGOCALCIFEROL) 31573 UNIT capsule TK 1 C PO 1 TIME WEEKLY  0        Family History:  Family History   Problem Relation Age of Onset     Breast Cancer Mother 52         at 52     Thyroid Disease Mother      Psychotic Disorder Mother      Obesity Mother      Lung Cancer Maternal Grandmother          at 70     Cerebrovascular Disease Maternal Grandfather      Diabetes Maternal Grandfather      Breast Cancer Paternal Grandmother 40        recurrence in 70s/recurrence in 70s,  at 70     Diabetes Father      Obesity Father      Psychotic Disorder Father      Hearing Loss Father      Colon Cancer Father 65     Breast Cancer Maternal Aunt 40         in 70s     Psychotic Disorder Sister      Colon Polyps Paternal Uncle         more than 10 colon polyps     Colon Cancer Cousin 50        maternal cousin     Cancer Paternal Aunt         unknown,  in 70s after reaction to chemo     Cancer Maternal Aunt 51        brain or bone cancer       Social History:  Social History     Socioeconomic History     Marital status:      Spouse name: Sukumar     Number of children: 2     Years of education: Not on file     Highest education level: Not on file   Occupational History     Employer: [employer] AT [employer] PARENT   Social Needs     Financial resource strain: Not on  file     Food insecurity:     Worry: Not on file     Inability: Not on file     Transportation needs:     Medical: Not on file     Non-medical: Not on file   Tobacco Use     Smoking status: Never Smoker     Smokeless tobacco: Never Used   Substance and Sexual Activity     Alcohol use: Yes     Alcohol/week: 0.0 standard drinks     Comment: very rarely     Drug use: No     Sexual activity: Yes     Partners: Male     Birth control/protection: Condom   Lifestyle     Physical activity:     Days per week: Not on file     Minutes per session: Not on file     Stress: Not on file   Relationships     Social connections:     Talks on phone: Not on file     Gets together: Not on file     Attends Nondenominational service: Not on file     Active member of club or organization: Not on file     Attends meetings of clubs or organizations: Not on file     Relationship status: Not on file     Intimate partner violence:     Fear of current or ex partner: Not on file     Emotionally abused: Not on file     Physically abused: Not on file     Forced sexual activity: Not on file   Other Topics Concern     Parent/sibling w/ CABG, MI or angioplasty before 65F 55M? No      Service No     Blood Transfusions No     Caffeine Concern No     Occupational Exposure No     Hobby Hazards No     Sleep Concern No     Stress Concern Yes     Weight Concern Yes     Comment: would like to lose weight     Special Diet Yes     Comment: elimination dairy     Back Care Yes     Exercise No     Bike Helmet Not Asked     Comment: NA, don't ride     Seat Belt Yes     Self-Exams No   Social History Narrative     Not on file       Physical Exam:  /74   Pulse 93   Temp 97.9  F (36.6  C) (Oral)   Resp 13   Wt 91.2 kg (201 lb)   LMP 12/04/2019 (Approximate)   SpO2 98%   BMI 30.56 kg/m       GENERAL APPEARANCE: healthy, alert and no distress     HENT: Mouth without ulcers or lesions     NECK: no adenopathy, no asymmetry or masses     LYMPHATICS: No  cervical, supraclavicular or, axillary  lymphadenopathy     RESP: lungs clear to auscultation - no rales, rhonchi or wheezes     CARDIOVASCULAR: regular rate and rhythm, normal S1 S2, no S3 or S4 and no murmur.   BREAST: A multi positional, bilateral breast exam was performed.  Fairly symmetrical. Right breast: no palpable dominant masses, no nipple discharge, no skin changes. Soft tissue.  Right axilla: no palpable adenopathy. Left breast: no palpable dominant masses, no nipple discharge, no skin changes. Left axilla: no palpable adenopathy. Minor fibrocystic changes in superior portion between 11-1 o'clock. Soft  tissue.        SKIN: no suspicious lesions or rashes on anterior torso, upper extremities or face.    Laboratory/Imaging Studies  No results found for any visits on 12/13/19.    ASSESSMENT  We discussed her last screening tests and reviewed results.  We reviewed her interval history; she has been doing well overall. She states that she has been working on creating more personal time for herself, has joined the [employer] and is practicing swimming. She is also making sure that she gets to bed every night at midnight and is doing better with sleep.  She has intentionally lost 10 pounds since 5/1/2018 by increasing her exercise.  She is planning to stick with this plan and intends to bring her weight down more by our next visit.    There are no changes to her family history; she is pleased that her son's mood swings and behavior are better now that he is on ADHD medication and she is wanting to continue to improve her own health.    Her exam today is unremarkable and she will proceed to have a screening mammogram after this visit.      INDIVIDUALIZED CANCER RISK MANAGEMENT PLAN:  Individualized Surveillance Plan for women  With 20% or greater lifetime risk of breast cancer   Per NCCN Breast Cancer Screening and Diagnosis Guidelines Version 2.2018    Recommended screening Test or procedure Last done Next Scheduled     Clinical encounter Ongoing risk assessment, risk reduction counseling and clinical breast exam, every 6-12 months. Refer to genetic counseling if not already done.   12/13/2019 December 2020   However, some family histories with breast cancers at a very young age, may warrant screening starting earlier.    *May begin at age 40 if breast cancers in the family occur at later ages.    Annual mammogram beginning 10 years younger than the earliest breast cancer in the family but not prior to age 30.    Recommend annual breast MRI to begin 10 years younger than the earliest breast cancer in the family but not prior to age 25.    Breast MRIs are preferably done on day 7-15 of the menstrual cycle in premenopausal women. 12/28/2018-  MRI guided R core biopsy, normal breast tissue on pathology.    5/31/2019 - Breast MRI, BiRads2. Mammogram 12/18/2019 at [facility]    Breast MRI in June 2020    Next exam: December 2020 followed by tomosynthesis mammogram   Breast screening for patients at high risk due to thoracic radiation before 30 years old    Begin 10 years post radiation treatment or age 25.   Annual mammogram beginning 10 years after radiation but not prior to age 30    Annual breast MRI, preferably on day 7-15 of menstrual cycle for premenopausal women.       NA     NA   Women who have a lifetime risk of >20% based on history of LCIS or ADH/ALH Annual screening mammogram beginning at age of LCIS or ADH/ALH but not prior to age 30.    Consider annual MRI to begin at age of diagnosis of LCIS or ADH/ALH but not prior to age 25.    Consider risk reducing strategies.     NA     NA    Recommend risk reducing strategies for women with 1.7% 5 year risk of breast cancer.         I spent 25 minutes with the patient with greater that 50% of it in counseling and coordinating care as documented above.    Elena Aranda, JENNIFER-CNS, OCN, AGN-BC  Clinical Nurse Specialist  Cancer Risk Management Program  [facility]  [address]  [place] [address]    phone:  [phone]  Pager: [phone]  fax: [phone]    CC: MD Toya Montes, JENNIFER-DEBI Carmona MD

## 2019-12-13 NOTE — Clinical Note
2019       RE: Maritza Hanson  [address]  [place] [address]     Dear Colleague,    Thank you for referring your patient, Maritza Hanson, to the [facility]. Please see a copy of my visit note below.    Oncology Risk Management Consultation:  Date on this visit: 2019    Maritza Hanson returns to the clinic today for a six month follow up. She requires high risk screening and surveillance for her risk of cancer secondary to having a family history of breast cancer in her mother at age 52,  maternal aunt in her 40s and paternal grandmother in her 40s.     Primary Physician: Ollie Loera PA-C    History Of Present Illness:  Ms. Hanson is a very pleasant  44 year old female who presents with family history of breast cancer.     Genetic Testin2015 - negative for genetic mutations in 25 genes using a [employer] panel through [employer]. Maritza was found to be negative for genetic mutations in: APC, MATTHEW, BARD1, BMPR1A, BRCA1, BRCA2, BRIP1, CDH1, CDK4, CDKN2A, CHEK2, EPCAM (large rearrangement only), MLH1, MSH2, MSH6, MUTYH, NBN, PALB2, PMS2, PTEN, RAD51C, RAD51D, SMAD4, STK11, and TP53 genes. No mutations were found in any of the 25 genes analyzed. This test involved sequencing and deletion/duplication analysis of all genes with the exception of EPCAM (deletions only).     Pertinent history:  Menarche at age 14.  First child at age 34.   Premenopausal.  Ovaries and uterus intact.  LMP:  __  Density: scattered fibroglandular densities  History of breastfeeding both of her sons, for 6 months and 10 months, respectively.   History of OCPs 10 years and used one dose of Depo-Provera; she was unable to tolerate the medication, as it heightened her anxiety.  No history of HRT  2018- History of MRI guided R core biopsy, normal breast tissue on pathology.  No history of hyperplasia, atypia or malignancy.    Breast imaging history:  Breast MRI and  mammogram in  from [place]. No records available; she states they were normal.  2015 -Screening mammogram, BiRads 0 for asymmetry in R breast  2015 - Diagnostic mammogram, R breast - BiRads1, fibroglandular asymmetry in upper R breast  2016 - Breast MRI, BiRads1  2016 - Screening tomosynthesis mammogram, BiRads1.  2017 - Breast MRI, BiRads1  2018-Screening mammogram, BiRads1.  2018 - Breast MRI, BiRads0 for Nonmasslike area of  enhancement in the deep right breast at about 12:00, 10 cm from the nipple. The area measures 2.9 x 2.1 x 2.0 cm.   2018 - Diagnostic tomosynthesis mammogram and right US, both BiRads4 suspicous.  2018-  MRI guided R core biopsy, normal breast tissue on pathology.  2019 - Breast MRI, BiRads2.    Other cancer screenin2016 - Colonoscopy for family history of colon cancer in father at age 65, paternal cousin with colon cancer in 50s. And paternal uncle with 10+ colon polyps. Results: 7 hyperplastic plastic polyps removed (2 in ascending and 5 in sigmoid), and 1 tubular adenoma removed from descending colon.     2019 - Colonoscopy, 2 mm tubular adenoma in cecum, 2 mm tubular adenoma in ascending colon, 2 mm hyperplastic polyp in sigmoid colon. Mild patchy erythema with a few small linear erosions in distal rectum, suggestive of mechanical injury. Next due:     At this visit, she denies new fatigue, breast pain, asymmetry, lumps, masses, thickening, pain, nipple discharge and skin changes in her breasts.     Past Medical/Surgical History:  Past Medical History:   Diagnosis Date     Anemia during pregnancy and now     Asthma      Back pain      Depression with anxiety since teenage     History of tics      Mild preeclampsia     during both pregnancy, resolved     POTS (postural orthostatic tachycardia syndrome)      Past Surgical History:   Procedure Laterality Date     CHOLECYSTECTOMY, LAPOROSCOPIC  2017     removal of  sesimoid bone right foot        wisdom teeth extraction         Allergies:  Allergies as of 2019 - Reviewed 2019   Allergen Reaction Noted     Hydrocodone Itching 2012     Sulfa drugs Hives 2012     Zofran [ondansetron] Itching 2018       Current Medications:  Current Outpatient Medications   Medication Sig Dispense Refill     atorvastatin (LIPITOR) 10 MG tablet Take 10 mg by mouth       Cetirizine HCl (ZYRTEC ALLERGY PO) Take 10 mg by mouth 2 times daily       dicyclomine (BENTYL) 10 MG capsule TK 1-2 CS PO QID PRF ABDOMINAL PAIN  2     EPINEPHrine (EPIPEN/ADRENACLICK/OR ANY BX GENERIC EQUIV) 0.3 MG/0.3ML injection 2-pack 1 mg  0     Esomeprazole Magnesium (NEXIUM PO)        gabapentin (NEURONTIN) 300 MG capsule Take 3 capsules by mouth       hydrOXYzine (VISTARIL) 25 MG capsule TK 1 TO 2 CS PO Q 4 TO 6 HOURS PRN  0     hyoscyamine (LEVSIN/SL) 0.125 MG sublingual tablet TK 1 T PO SUBLINGUAL ROUTE QID  3     LORazepam (ATIVAN) 1 MG tablet Take 1 mg by mouth once  0     LORazepam (ATIVAN) 1 MG tablet Take 0.5 tablets (0.5 mg) by mouth every 8 hours as needed for anxiety (prior to breast MRI) Take 30 minutes prior to departure.  Do not operate a vehicle after taking this medication 2 tablet 0     metoclopramide (REGLAN) 5 MG tablet Take 1 tablet (5 mg) by mouth 3 times daily as needed 30 tablet 0     MONTELUKAST SODIUM PO Take 10 mg by mouth At Bedtime       NORTRIPTYLINE HCL PO Take 40 mg by mouth       vitamin D (ERGOCALCIFEROL) 38964 UNIT capsule TK 1 C PO 1 TIME WEEKLY  0        Family History:  Family History   Problem Relation Age of Onset     Breast Cancer Mother 52         at 52     Thyroid Disease Mother      Psychotic Disorder Mother      Obesity Mother      Lung Cancer Maternal Grandmother          at 70     Cerebrovascular Disease Maternal Grandfather      Diabetes Maternal Grandfather      Breast Cancer Paternal Grandmother 40        recurrence in  70s/recurrence in 70s,  at 70     Diabetes Father      Obesity Father      Psychotic Disorder Father      Hearing Loss Father      Colon Cancer Father 65     Breast Cancer Maternal Aunt 40         in 70s     Psychotic Disorder Sister      Colon Polyps Paternal Uncle         more than 10 colon polyps     Colon Cancer Cousin 50        maternal cousin     Cancer Paternal Aunt         unknown,  in 70s after reaction to chemo     Cancer Maternal Aunt 51        brain or bone cancer       Social History:  Social History     Socioeconomic History     Marital status:      Spouse name: Sukumar     Number of children: 2     Years of education: Not on file     Highest education level: Not on file   Occupational History     Employer: [employer]   Social Needs     Financial resource strain: Not on file     Food insecurity:     Worry: Not on file     Inability: Not on file     Transportation needs:     Medical: Not on file     Non-medical: Not on file   Tobacco Use     Smoking status: Never Smoker     Smokeless tobacco: Never Used   Substance and Sexual Activity     Alcohol use: Yes     Alcohol/week: 0.0 standard drinks     Comment: very rarely     Drug use: No     Sexual activity: Yes     Partners: Male     Birth control/protection: Condom   Lifestyle     Physical activity:     Days per week: Not on file     Minutes per session: Not on file     Stress: Not on file   Relationships     Social connections:     Talks on phone: Not on file     Gets together: Not on file     Attends Nondenominational service: Not on file     Active member of club or organization: Not on file     Attends meetings of clubs or organizations: Not on file     Relationship status: Not on file     Intimate partner violence:     Fear of current or ex partner: Not on file     Emotionally abused: Not on file     Physically abused: Not on file     Forced sexual activity: Not on file   Other Topics Concern     Parent/sibling w/ CABG, MI or  "angioplasty before 65F 55M? No      Service No     Blood Transfusions No     Caffeine Concern No     Occupational Exposure No     Hobby Hazards No     Sleep Concern No     Stress Concern Yes     Weight Concern Yes     Comment: would like to lose weight     Special Diet Yes     Comment: elimination dairy     Back Care Yes     Exercise No     Bike Helmet Not Asked     Comment: NA, don't ride     Seat Belt Yes     Self-Exams No   Social History Narrative     Not on file       Physical Exam:  There were no vitals taken for this visit.  {EXAM COMPLETE FEMALE:[id number]::\"  GENERAL APPEARANCE: healthy, alert and no apparent distress, except for the concern of her risk of cancer\",\"   CARDIOVASCULAR: regular rate and rhythm, normal S1 S2, no S3 or S4 and no murmur. Pulses +1 in all extremities\",\"   ABDOMEN:  soft, nontender, no masses and bowel sounds present in all 4 quadrants\",\"     SKIN: no suspicious lesions or rashes\",\"   HENT: Mouth without ulcers or lesions\",\"   NECK: no adenopathy, no asymmetry or masses\",\"   LYMPHATICS: No cervical, supraclavicular, axillary or inguinal lymphadenopathy\",\"   RESP: lungs clear to auscultation - no rales, rhonchi or wheezes\"}    Laboratory/Imaging Studies  No results found for any visits on 12/13/19.    ASSESSMENT  We discussed her last screening tests and reviewed results.  We discussed concerns and symptoms s      We also reviewed the current healthy lifestyle recommendations by  NCCN and the American Cancer Society that can reduce the risk of breast cancer including:    Breast Cancer Prevention (Dana et al 2007)   Breast feeding (months)  ?16 Risk reduction up to 27%     Childbearing ?5 Risk reduction up to 29%    Recreational exercise Yes Risk reduction up to 30%    Postmenopause body mass index (kg/m2) <22.9 Risk reduction up to 39%    Oophorectomy before age 35 years  Yes Risk reduction up to 70%    Aspirin ?Once/week for ?6 months Risk reduction up to 21%  "       INDIVIDUALIZED CANCER RISK MANAGEMENT PLAN:  Individualized Surveillance Plan for women  With 20% or greater lifetime risk of breast cancer   Per NCCN Breast Cancer Screening and Diagnosis Guidelines Version 2.2018    Recommended screening Test or procedure Last done Next Scheduled    Clinical encounter Ongoing risk assessment, risk reduction counseling and clinical breast exam, every 6-12 months. Refer to genetic counseling if not already done.   12/13/2019 December 2020   However, some family histories with breast cancers at a very young age, may warrant screening starting earlier.    *May begin at age 40 if breast cancers in the family occur at later ages.    Annual mammogram beginning 10 years younger than the earliest breast cancer in the family but not prior to age 30.    Recommend annual breast MRI to begin 10 years younger than the earliest breast cancer in the family but not prior to age 25.    Breast MRIs are preferably done on day 7-15 of the menstrual cycle in premenopausal women. 12/28/2018-  MRI guided R core biopsy, normal breast tissue on pathology.    5/31/2019 - Breast MRI, BiRads2. Mammogram today after appt.    Breast MRI in June 2020    Next exam: December 2020 followed by tomosynthesis mammogram   Breast screening for patients at high risk due to thoracic radiation before 30 years old    Begin 10 years post radiation treatment or age 25.   Annual mammogram beginning 10 years after radiation but not prior to age 30    Annual breast MRI, preferably on day 7-15 of menstrual cycle for premenopausal women.       NA     NA   Women who have a lifetime risk of >20% based on history of LCIS or ADH/ALH Annual screening mammogram beginning at age of LCIS or ADH/ALH but not prior to age 30.    Consider annual MRI to begin at age of diagnosis of LCIS or ADH/ALH but not prior to age 25.    Consider risk reducing strategies.     NA     NA    Recommend risk reducing strategies for women with 1.7% 5  year risk of breast cancer.                       I spent xx minutes with the patient with greater that 50% of it in counseling and coordinating care as documented above.                          Again, thank you for allowing me to participate in the care of your patient.      Sincerely,    JENNIFER Guerrero CNS

## 2019-12-18 ENCOUNTER — HOSPITAL ENCOUNTER (OUTPATIENT)
Dept: MAMMOGRAPHY | Facility: CLINIC | Age: 44
Discharge: HOME | End: 2019-12-18
Attending: CLINICAL NURSE SPECIALIST | Admitting: CLINICAL NURSE SPECIALIST
Payer: COMMERCIAL

## 2019-12-18 DIAGNOSIS — Z12.39 BREAST CANCER SCREENING, HIGH RISK PATIENT: ICD-10-CM

## 2019-12-18 PROCEDURE — 77063 BREAST TOMOSYNTHESIS BI: CPT

## 2020-03-02 ENCOUNTER — HEALTH MAINTENANCE LETTER (OUTPATIENT)
Age: 45
End: 2020-03-02

## 2020-05-19 NOTE — PROGRESS NOTES
Obstetric Discharge Summary  [address] [facility]    Patient Name: Danna Casarez  Patient : 1993  Primary Care Physician: No primary care provider on file.   Admit Date: 2020    Principal Diagnosis: IUP at 37w6d, admitted for Onset of Labor  Elevated B/P     Her pregnancy has been complicated by:   Patient Active Problem List   Diagnosis    History of macrosomia in infant in prior pregnancy, currently pregnant    Obesity affecting pregnancy    High-risk pregnancy    RECEIVED flu shot    At risk for depression    HEATH (generalized anxiety disorder)    Elevated 1 hour-normal 3 hour    RECEIVED tdap    GBS (group B Streptococcus carrier), +RV culture, currently pregnant    Uterine contractions during pregnancy    Rh+/RI/GBS+    VAVD 20 M Apg  Wt 11#14    Vacuum-assisted vaginal delivery (G4)    Shoulder dystocia (G4)    Preeclampsia    Anemia    Maternal exposure to alcohol in first trimester       Infection Present?: No  Hospital Acquired: No    Surgical Operations & Procedures:  [] Pitocin Induction of Labor  [x] Pitocin Augmentation of Labor  [] Prostaglandin Induction of Labor  [] Cytotec (Misoprostol)  [] Mechanical Induction of Labor  [] Artificial Rupture of Membranes  [] Intrauterine Pressure Catheter  [] Fetal Scalp Electrode  [] Amnioinfusion  [] Antibiotic Prophylaxis    Analgesia: epidural  Delivery Type: Spontaneous Vaginal Delivery: Vacuum Extraction   Laceration(s): Absent    Consultations: ,\"NICU\",\"Anesthesia    Pertinent Findings & Procedures:   Danna Casarez is a 23 y.o. female [id number] at 41w10d admitted for labor; received epidural. She delivered by [place] vaginal a Live Born      Information for the patient's :  Dora Lanier [[id number]]   male  Birth Weight: 11 lb 14 oz (5.385 kg)      Apgars:   Information for the patient's :  Dora Lanier [[id number]]          Postpartum course: abnormal  preeclampsia with severe Patient called to speak to PASTOR Luevano ~ medication needed for Breast MRI.  Will send message to ANP to please contact patient to discuss.  Guido Mustafa RN, BSN, OCN  [facility]  Patient Care Coordinator      features . Course of patient: complicated by severe range blood pressure necessitating IV antihypertensives.  Stable now on oral anti hypertensive agent (procardia)     Discharge to: Home    Readmission planned: no     Recommendations on Discharge:     Medications:      [employer]   Home Medication Instructions OCW:[id number]    Printed on:05/21/20 4425   Medication Information                      acetaminophen (TYLENOL) 325 MG tablet  Take 650 mg by mouth every 6 hours as needed for Pain             docusate sodium (COLACE, DULCOLAX) 100 MG CAPS  Take 100 mg by mouth 2 times daily             famotidine (PEPCID) 40 MG tablet  Take 40 mg by mouth daily             ferrous sulfate (FE TABS) 325 (65 Fe) MG EC tablet  Take 1 tablet by mouth daily (with breakfast)             ibuprofen (ADVIL;MOTRIN) 800 MG tablet  Take 1 tablet by mouth every 8 hours             NIFEdipine (ADALAT CC) 30 MG extended release tablet  Take 1 tablet by mouth daily             omeprazole (PRILOSEC) 20 MG delayed release capsule  Take 1 capsule by mouth Daily             Prenatal Vit-Fe Fumarate-FA (PRENATAL VITAMIN PO)  Take by mouth                 Activity: Slow return to ADLs  Diet: Regular  Follow up: 1 weeks with [facility] for blood pressure check    Condition on discharge: stable    Discharge date: 5/21/20

## 2020-09-29 ENCOUNTER — TELEPHONE (OUTPATIENT)
Dept: ONCOLOGY | Facility: CLINIC | Age: 45
End: 2020-09-29

## 2020-09-29 ENCOUNTER — MYC MEDICAL ADVICE (OUTPATIENT)
Dept: ONCOLOGY | Facility: CLINIC | Age: 45
End: 2020-09-29

## 2020-09-29 DIAGNOSIS — R92.30 DENSE BREAST: ICD-10-CM

## 2020-09-29 DIAGNOSIS — Z12.39 BREAST CANCER SCREENING, HIGH RISK PATIENT: Primary | ICD-10-CM

## 2020-09-29 DIAGNOSIS — F41.9 ANXIETY: Primary | ICD-10-CM

## 2020-09-30 RX ORDER — LORAZEPAM 1 MG/1
TABLET ORAL
Qty: 2 TABLET | Refills: 0 | Status: SHIPPED | OUTPATIENT
Start: 2020-09-30 | End: 2022-05-06

## 2020-11-11 ENCOUNTER — HOSPITAL ENCOUNTER (OUTPATIENT)
Dept: MRI IMAGING | Facility: CLINIC | Age: 45
Discharge: HOME | End: 2020-11-11
Attending: CLINICAL NURSE SPECIALIST | Admitting: CLINICAL NURSE SPECIALIST
Payer: COMMERCIAL

## 2020-11-11 ENCOUNTER — TELEPHONE (OUTPATIENT)
Dept: ONCOLOGY | Facility: CLINIC | Age: 45
End: 2020-11-11

## 2020-11-11 DIAGNOSIS — R92.30 DENSE BREAST: ICD-10-CM

## 2020-11-11 DIAGNOSIS — Z12.39 BREAST CANCER SCREENING, HIGH RISK PATIENT: ICD-10-CM

## 2020-11-11 DIAGNOSIS — R92.8 ABNORMAL MRI, BREAST: Primary | ICD-10-CM

## 2020-11-11 PROCEDURE — 77049 MRI BREAST C-+ W/CAD BI: CPT

## 2020-11-11 PROCEDURE — A9585 GADOBUTROL INJECTION: HCPCS | Performed by: CLINICAL NURSE SPECIALIST

## 2020-11-11 PROCEDURE — 255N000002 HC RX 255 OP 636: Performed by: CLINICAL NURSE SPECIALIST

## 2020-11-11 RX ORDER — GADOBUTROL 604.72 MG/ML
10 INJECTION INTRAVENOUS ONCE
Status: COMPLETED | OUTPATIENT
Start: 2020-11-11 | End: 2020-11-11

## 2020-11-11 RX ADMIN — GADOBUTROL 9 ML: 604.72 INJECTION INTRAVENOUS at 09:25

## 2020-11-17 ENCOUNTER — HOSPITAL ENCOUNTER (OUTPATIENT)
Dept: ULTRASOUND IMAGING | Facility: CLINIC | Age: 45
Discharge: HOME | End: 2020-11-17
Attending: CLINICAL NURSE SPECIALIST | Admitting: CLINICAL NURSE SPECIALIST
Payer: COMMERCIAL

## 2020-11-17 DIAGNOSIS — R92.8 ABNORMAL MRI, BREAST: ICD-10-CM

## 2020-11-17 PROCEDURE — 76642 ULTRASOUND BREAST LIMITED: CPT | Mod: LT

## 2021-05-12 ENCOUNTER — TELEPHONE (OUTPATIENT)
Dept: ONCOLOGY | Facility: CLINIC | Age: 46
End: 2021-05-12

## 2021-05-12 NOTE — TELEPHONE ENCOUNTER
5/12/21 - [facility] to call [phone] opt5, opt2 to change appointment to VIDEO OR TELELPHONE with Elena Aranda on 5/21/21. -Paolo

## 2021-05-20 NOTE — PROGRESS NOTES
Oncology Risk Management Consultation:  Date on this visit: 2021    Maritza Hanson  is participating in a billable video visit today due to CoVid19 pandemic precautions.   She requires high risk screening and surveillance for her risk of cancer secondary to having a family history of breast cancer in her mother at age 52,  maternal aunt in her 40s and paternal grandmother in her 40s. She has a 28% lifetime risk for breast cancer by the Evaristo model. She has a 2.4% risk for breast cancer within the next 5 years by the Naty model.      Primary Physician: Ollie Loera PA-C     History Of Present Illness:  Ms. Hanson is a very pleasant, healthy  45 year old female who presents with a family history of breast cancer.      Genetic Testin2015 - negative for genetic mutations in 25 genes using a My Risk panel through [employer]. Maritza was found to be negative for genetic mutations in: APC, MATTHEW, BARD1, BMPR1A, BRCA1, BRCA2, BRIP1, CDH1, CDK4, CDKN2A, CHEK2, EPCAM (large rearrangement only), MLH1, MSH2, MSH6, MUTYH, NBN, PALB2, PMS2, PTEN, RAD51C, RAD51D, SMAD4, STK11, and TP53 genes. No mutations were found in any of the 25 genes analyzed. This test involved sequencing and deletion/duplication analysis of all genes with the exception of EPCAM (deletions only).      Pertinent history:  Menarche at age 14.  First child at age 34.   Premenopausal.  Ovaries and uterus intact.Hx of tubal ligation.  Density: scattered fibroglandular densities  History of breastfeeding both of her sons, for 6 months and 10 months, respectively.   History of OCPs 10 years and used one dose of Depo-Provera; she was unable to tolerate the medication, as it heightened her anxiety.  No history of HRT  2018- History of MRI guided R core biopsy, normal breast tissue on pathology.  No history of hyperplasia, atypia or malignancy.     Breast imaging history:  Breast MRI and mammogram in  from [place]. No records  available; she states they were normal.  2015 -Screening mammogram, BiRads 0 for asymmetry in R breast  2015 - Diagnostic mammogram, R breast - BiRads1, fibroglandular asymmetry in upper R breast  2016 - Breast MRI, BiRads1  2016 - Screening tomosynthesis mammogram, BiRads1.  2017 - Breast MRI, BiRads1  2018-Screening mammogram, BiRads1.  2018 - Breast MRI, BiRads0 for Nonmasslike area of  enhancement in the deep right breast at about 12:00, 10 cm from the nipple. The area measures 2.9 x 2.1 x 2.0 cm.   2018 - Diagnostic tomosynthesis mammogram and right US, both BiRads4 suspicious.  2018-  MRI guided R core biopsy, normal breast tissue on pathology.  2019 - Breast MRI, BiRads2.  2019- Screening tomosynthesis mammogram, BiRads1  2020- Breast MRI, BiRads3 for Probable benign background parenchymal enhancement at the 12:00  position of the left breast. Further evaluation with targeted ultrasound is recommended. If the ultrasound shows only normal tissue, routine high risk screening would be recommended.  2020- Left breast US follow up, BiRads2  2021- Screening tomosynthesis mammogram set for after visit.    Other cancer screenin2016 - Colonoscopy for family history of colon cancer in father at age 65, paternal cousin with colon cancer in 50s. And paternal uncle with 10+ colon polyps. Results: 7 hyperplastic plastic polyps removed (2 in ascending and 5 in sigmoid), and 1 tubular adenoma removed from descending colon.      2019 - Colonoscopy, 2 mm tubular adenoma in cecum, 2 mm tubular adenoma in ascending colon, 2 mm hyperplastic polyp in sigmoid colon. Mild patchy erythema with a few small linear erosions in distal rectum, suggestive of mechanical injury. Next due:      At this visit, she denies new fatigue, breast pain, asymmetry, lumps, masses, thickening, pain, nipple discharge and skin changes in her breasts.     Past  Medical/Surgical History:  Past Medical History:   Diagnosis Date     Anemia during pregnancy and now     Asthma      Back pain      Depression with anxiety since teenage     History of tics      Mild preeclampsia     during both pregnancy, resolved     POTS (postural orthostatic tachycardia syndrome)      Past Surgical History:   Procedure Laterality Date     CHOLECYSTECTOMY, LAPOROSCOPIC  12/2017     removal of sesimoid bone right foot   1994     wisdom teeth extraction  1998       Allergies:  Allergies as of 05/21/2021 - Reviewed 11/11/2020   Allergen Reaction Noted     Dogs  12/13/2019     Grass  12/13/2019     Hydrocodone Itching 11/13/2012     Mold  12/13/2019     Sulfa drugs Hives 11/13/2012     Trees  12/13/2019     Zofran [ondansetron] Itching 05/16/2018       Current Medications:  Current Outpatient Medications   Medication Sig Dispense Refill     atorvastatin (LIPITOR) 10 MG tablet Take 10 mg by mouth       Cetirizine HCl (ZYRTEC ALLERGY PO) Take 10 mg by mouth 2 times daily       dicyclomine (BENTYL) 10 MG capsule TK 1-2 CS PO QID PRF ABDOMINAL PAIN  2     EPINEPHrine (EPIPEN/ADRENACLICK/OR ANY BX GENERIC EQUIV) 0.3 MG/0.3ML injection 2-pack 1 mg  0     Esomeprazole Magnesium (NEXIUM PO)        gabapentin (NEURONTIN) 300 MG capsule Take 3 capsules by mouth       hydrOXYzine (VISTARIL) 25 MG capsule TK 1 TO 2 CS PO Q 4 TO 6 HOURS PRN  0     hyoscyamine (LEVSIN/SL) 0.125 MG sublingual tablet TK 1 T PO SUBLINGUAL ROUTE QID  3     LINZESS 145 MCG capsule TK 1 C PO D ON AN EMPTY STOMACH AT LEAST 30 BEFORE 1ST MEAL OF THE DAY  3     LORazepam (ATIVAN) 1 MG tablet May take 1/2-1 tablet 30 minutes prior to departure for breast MRI.  Do not operate a vehicle after taking this medication 2 tablet 0     LORazepam (ATIVAN) 1 MG tablet Take 30 minutes prior to departure.  Do not operate a vehicle after taking this medication 2 tablet 0     LORazepam (ATIVAN) 1 MG tablet Take 1 mg by mouth once  0     LORazepam  (ATIVAN) 1 MG tablet Take 0.5 tablets (0.5 mg) by mouth every 8 hours as needed for anxiety (prior to breast MRI) Take 30 minutes prior to departure.  Do not operate a vehicle after taking this medication 2 tablet 0     metoclopramide (REGLAN) 5 MG tablet Take 1 tablet (5 mg) by mouth 3 times daily as needed 30 tablet 0     MONTELUKAST SODIUM PO Take 10 mg by mouth At Bedtime       NORTRIPTYLINE HCL PO Take 40 mg by mouth       prochlorperazine (COMPAZINE) 25 MG suppository UNW AND I 1 SUP REC BID PRN  0     promethazine (PHENERGAN) 12.5 MG tablet TK 1/2 TO 1 T PO BID PRF NAUSEA  2     SUMAtriptan (IMITREX) 20 MG/ACT nasal spray   2     vitamin D (ERGOCALCIFEROL) 46329 UNIT capsule TK 1 C PO 1 TIME WEEKLY  0        Family History:  Family History   Problem Relation Age of Onset     Breast Cancer Mother 52         at 52     Thyroid Disease Mother      Psychotic Disorder Mother      Obesity Mother      Lung Cancer Maternal Grandmother          at 70     Cerebrovascular Disease Maternal Grandfather      Diabetes Maternal Grandfather      Breast Cancer Paternal Grandmother 40        recurrence in 70s/recurrence in 70s,  at 70     Diabetes Father      Obesity Father      Psychotic Disorder Father      Hearing Loss Father      Colon Cancer Father 65     Breast Cancer Maternal Aunt 40         in 70s     Psychotic Disorder Sister      Colon Polyps Paternal Uncle         more than 10 colon polyps     Colon Cancer Cousin 50        maternal cousin     Cancer Paternal Aunt         unknown,  in 70s after reaction to chemo     Cancer Maternal Aunt 51        brain or bone cancer       Social History:  Social History     Socioeconomic History     Marital status:      Spouse name: Sukumar     Number of children: 2     Years of education: Not on file     Highest education level: Not on file   Occupational History     Employer: [employer]   Social Needs     Financial resource strain: Not on file      Food insecurity     Worry: Not on file     Inability: Not on file     Transportation needs     Medical: Not on file     Non-medical: Not on file   Tobacco Use     Smoking status: Never Smoker     Smokeless tobacco: Never Used   Substance and Sexual Activity     Alcohol use: Yes     Alcohol/week: 0.0 standard drinks     Comment: very rarely     Drug use: No     Sexual activity: Yes     Partners: Male     Birth control/protection: Condom   Lifestyle     Physical activity     Days per week: Not on file     Minutes per session: Not on file     Stress: Not on file   Relationships     Social connections     Talks on phone: Not on file     Gets together: Not on file     Attends Congregation service: Not on file     Active member of club or organization: Not on file     Attends meetings of clubs or organizations: Not on file     Relationship status: Not on file     Intimate partner violence     Fear of current or ex partner: Not on file     Emotionally abused: Not on file     Physically abused: Not on file     Forced sexual activity: Not on file   Other Topics Concern     Parent/sibling w/ CABG, MI or angioplasty before 65F 55M? No      Service No     Blood Transfusions No     Caffeine Concern No     Occupational Exposure No     Hobby Hazards No     Sleep Concern No     Stress Concern Yes     Weight Concern Yes     Comment: would like to lose weight     Special Diet Yes     Comment: elimination dairy     Back Care Yes     Exercise No     Bike Helmet Not Asked     Comment: NA, don't ride     Seat Belt Yes     Self-Exams No   Social History Narrative     Not on file       Physical Exam:  There were no vitals taken for this visit.  GENERAL: Healthy, alert and no distress  EYES: Eyes grossly normal to inspection.  No discharge or erythema, or obvious scleral/conjunctival abnormalities.  RESP: No audible wheeze, cough, or visible cyanosis.  No visible retractions or increased work of breathing.    SKIN: Visible skin clear.  No significant rash, abnormal pigmentation or lesions.  NEURO: Cranial nerves grossly intact.  Mentation and speech appropriate for age.  PSYCH: Mentation appears normal, affect normal/bright, judgement and insight intact, normal speech and appearance well-groomed.  Laboratory/Imaging Studies  No results found for any visits on 05/21/21.    ASSESSMENT  We discussed her last screening tests and reviewed her interval history. She has intentionally lost 40 pounds in the last yea to achieve a weight of 163 pounds; her BMI is 24.9. She used Noom, adding in walking with her family on a daily basis and creating rewards for herself with exercise and dietary goals. She is planning to reward her family with a trip to [place] next year, as she meets her exercise milestone.    We discussed her breast health; she has no concerns today and will be having a clinical exam with her primary care provider in late August or early September.  At this point, she will proceed with the screening plan below.    Individualized Surveillance Plan for women  With 20% or greater lifetime risk of breast cancer   Per NCCN Breast Cancer Screening and Diagnosis Guidelines Version 1.2021   Recommended screening Test or procedure Last done Next Scheduled    Clinical encounter Clinical exam every 6-12 months.   Refer to genetic counseling if not already done.  Consider risk reduction strategies.   Exam deferred   Next exam with Primary care in August/Sept.      Return in May 2022   However, some family histories with breast cancers at a very young age, may warrant screening starting earlier.    *May begin at age 40 if breast cancers in the family occur at later ages.    Annual mammogram beginning 10 years younger than the earliest breast cancer in the family but not prior to age 30.    Recommend annual breast MRI to begin 10 years younger than the earliest breast cancer in the family but not prior to age 25.    Breast MRIs are preferably done on  day 7-15 of the menstrual cycle in premenopausal women. 12/18/2019- Screening tomosynthesis mammogram, BiRads1    11/11/2020- Breast MRI, BiRads3 for Probable benign background parenchymal enhancement at the 12:00  position of the left breast. Further evaluation with targeted ultrasound is recommended. If the ultrasound shows only normal tissue, routine high risk screening would be recommended.    11/17/2020- Left breast US follow up, BiRads2   5/21/2021- Screening mammogram after visit today    Breast MRI in November (11/12 or later)   Breast screening for patients at high risk due to thoracic radiation between the ages of 10-30   Annual clinical exam beginning 8 years after radiation therapy.    Annual screening mammogram beginning at age 30 or 8 years after radiation therapy    Annual breast MRI, beginning at age 25 or 8 years after radiation therapy.       NA     NA   Women who have a lifetime risk of >20% based on history of LCIS or ADH/ALH Annual screening mammogram beginning at age of LCIS or ADH/ALH but not prior to age 30.    Consider annual MRI to begin at age of diagnosis of LCIS or ADH/ALH but not prior to age 25.    Consider risk reducing strategies.     NA     NA    Recommend risk reducing strategies for women with 1.7% 5 year risk of breast cancer. 2.4% 5 year risk by Naty model        I spent a total of 48 minutes on the day of the visit. Please see the note for further information on patient assessment and treatment.    JENNIFER Guerrero-CNS, OCN, AGN-BC  Clinical Nurse Specialist  Cancer Risk Management Program  [facility] [address]  [place] [address]    phone:  [phone]  Pager: [phone]  fax: [phone]    CC:  Ollie Loera PA-C

## 2021-05-21 ENCOUNTER — HOSPITAL ENCOUNTER (OUTPATIENT)
Dept: MAMMOGRAPHY | Facility: CLINIC | Age: 46
Discharge: HOME | End: 2021-05-21
Attending: CLINICAL NURSE SPECIALIST | Admitting: CLINICAL NURSE SPECIALIST
Payer: COMMERCIAL

## 2021-05-21 ENCOUNTER — VIRTUAL VISIT (OUTPATIENT)
Dept: ONCOLOGY | Facility: CLINIC | Age: 46
End: 2021-05-21
Attending: CLINICAL NURSE SPECIALIST
Payer: COMMERCIAL

## 2021-05-21 DIAGNOSIS — Z12.39 BREAST CANCER SCREENING, HIGH RISK PATIENT: ICD-10-CM

## 2021-05-21 DIAGNOSIS — Z12.39 BREAST CANCER SCREENING, HIGH RISK PATIENT: Primary | ICD-10-CM

## 2021-05-21 DIAGNOSIS — F41.9 ANXIETY: ICD-10-CM

## 2021-05-21 DIAGNOSIS — R92.30 DENSE BREAST: ICD-10-CM

## 2021-05-21 PROBLEM — H10.33 UNSPECIFIED ACUTE CONJUNCTIVITIS, BILATERAL: Status: ACTIVE | Noted: 2019-12-21

## 2021-05-21 PROBLEM — R42 LIGHTHEADED: Status: RESOLVED | Noted: 2018-05-16 | Resolved: 2021-05-21

## 2021-05-21 PROBLEM — J45.909 UNCOMPLICATED ASTHMA: Status: RESOLVED | Noted: 2017-09-20 | Resolved: 2021-05-21

## 2021-05-21 PROCEDURE — 999N001193 HC VIDEO/TELEPHONE VISIT; NO CHARGE

## 2021-05-21 PROCEDURE — 77063 BREAST TOMOSYNTHESIS BI: CPT

## 2021-05-21 PROCEDURE — 99204 OFFICE O/P NEW MOD 45 MIN: CPT | Mod: GT | Performed by: CLINICAL NURSE SPECIALIST

## 2021-05-21 RX ORDER — LORAZEPAM 1 MG/1
1 TABLET ORAL EVERY 8 HOURS PRN
Qty: 1 TABLET | Refills: 0 | Status: SHIPPED | OUTPATIENT
Start: 2021-05-21

## 2021-05-21 NOTE — LETTER
2021         RE: Maritza Hanson  [address]  [place] [address]        Dear Colleague,    Thank you for referring your patient, Maritza Hanson, to the [facility]. Please see a copy of my visit note below.    Oncology Risk Management Consultation:  Date on this visit: 2021    Maritza Hanson  is participating in a billable video visit today due to CoVid19 pandemic precautions.   She requires high risk screening and surveillance for her risk of cancer secondary to having a family history of breast cancer in her mother at age 52,  maternal aunt in her 40s and paternal grandmother in her 40s. She has a 28% lifetime risk for breast cancer by the Evaristo model. She has a 2.4% risk for breast cancer within the next 5 years by the Naty model.      Primary Physician: Ollie Loera PA-C     History Of Present Illness:  Ms. Hanson is a very pleasant, healthy  45 year old female who presents with a family history of breast cancer.      Genetic Testin2015 - negative for genetic mutations in 25 genes using a My Risk panel through [employer]. Maritza was found to be negative for genetic mutations in: APC, MATTHEW, BARD1, BMPR1A, BRCA1, BRCA2, BRIP1, CDH1, CDK4, CDKN2A, CHEK2, EPCAM (large rearrangement only), MLH1, MSH2, MSH6, MUTYH, NBN, PALB2, PMS2, PTEN, RAD51C, RAD51D, SMAD4, STK11, and TP53 genes. No mutations were found in any of the 25 genes analyzed. This test involved sequencing and deletion/duplication analysis of all genes with the exception of EPCAM (deletions only).      Pertinent history:  Menarche at age 14.  First child at age 34.   Premenopausal.  Ovaries and uterus intact.Hx of tubal ligation.  Density: scattered fibroglandular densities  History of breastfeeding both of her sons, for 6 months and 10 months, respectively.   History of OCPs 10 years and used one dose of Depo-Provera; she was unable to tolerate the medication, as it  heightened her anxiety.  No history of HRT  2018- History of MRI guided R core biopsy, normal breast tissue on pathology.  No history of hyperplasia, atypia or malignancy.     Breast imaging history:  Breast MRI and mammogram in  from [place]. No records available; she states they were normal.  2015 -Screening mammogram, BiRads 0 for asymmetry in R breast  2015 - Diagnostic mammogram, R breast - BiRads1, fibroglandular asymmetry in upper R breast  2016 - Breast MRI, BiRads1  2016 - Screening tomosynthesis mammogram, BiRads1.  2017 - Breast MRI, BiRads1  2018-Screening mammogram, BiRads1.  2018 - Breast MRI, BiRads0 for Nonmasslike area of  enhancement in the deep right breast at about 12:00, 10 cm from the nipple. The area measures 2.9 x 2.1 x 2.0 cm.   2018 - Diagnostic tomosynthesis mammogram and right US, both BiRads4 suspicious.  2018-  MRI guided R core biopsy, normal breast tissue on pathology.  2019 - Breast MRI, BiRads2.  2019- Screening tomosynthesis mammogram, BiRads1  2020- Breast MRI, BiRads3 for Probable benign background parenchymal enhancement at the 12:00  position of the left breast. Further evaluation with targeted ultrasound is recommended. If the ultrasound shows only normal tissue, routine high risk screening would be recommended.  2020- Left breast US follow up, BiRads2  2021- Screening tomosynthesis mammogram set for after visit.    Other cancer screenin2016 - Colonoscopy for family history of colon cancer in father at age 65, paternal cousin with colon cancer in 50s. And paternal uncle with 10+ colon polyps. Results: 7 hyperplastic plastic polyps removed (2 in ascending and 5 in sigmoid), and 1 tubular adenoma removed from descending colon.      2019 - Colonoscopy, 2 mm tubular adenoma in cecum, 2 mm tubular adenoma in ascending colon, 2 mm hyperplastic polyp in sigmoid colon. Mild patchy  erythema with a few small linear erosions in distal rectum, suggestive of mechanical injury. Next due: 2023     At this visit, she denies new fatigue, breast pain, asymmetry, lumps, masses, thickening, pain, nipple discharge and skin changes in her breasts.     Past Medical/Surgical History:  Past Medical History:   Diagnosis Date     Anemia during pregnancy and now     Asthma      Back pain      Depression with anxiety since teenage     History of tics      Mild preeclampsia     during both pregnancy, resolved     POTS (postural orthostatic tachycardia syndrome)      Past Surgical History:   Procedure Laterality Date     CHOLECYSTECTOMY, LAPOROSCOPIC  12/2017     removal of sesimoid bone right foot   1994     wisdom teeth extraction  1998       Allergies:  Allergies as of 05/21/2021 - Reviewed 11/11/2020   Allergen Reaction Noted     Dogs  12/13/2019     Grass  12/13/2019     Hydrocodone Itching 11/13/2012     Mold  12/13/2019     Sulfa drugs Hives 11/13/2012     Trees  12/13/2019     Zofran [ondansetron] Itching 05/16/2018       Current Medications:  Current Outpatient Medications   Medication Sig Dispense Refill     atorvastatin (LIPITOR) 10 MG tablet Take 10 mg by mouth       Cetirizine HCl (ZYRTEC ALLERGY PO) Take 10 mg by mouth 2 times daily       dicyclomine (BENTYL) 10 MG capsule TK 1-2 CS PO QID PRF ABDOMINAL PAIN  2     EPINEPHrine (EPIPEN/ADRENACLICK/OR ANY BX GENERIC EQUIV) 0.3 MG/0.3ML injection 2-pack 1 mg  0     Esomeprazole Magnesium (NEXIUM PO)        gabapentin (NEURONTIN) 300 MG capsule Take 3 capsules by mouth       hydrOXYzine (VISTARIL) 25 MG capsule TK 1 TO 2 CS PO Q 4 TO 6 HOURS PRN  0     hyoscyamine (LEVSIN/SL) 0.125 MG sublingual tablet TK 1 T PO SUBLINGUAL ROUTE QID  3     LINZESS 145 MCG capsule TK 1 C PO D ON AN EMPTY STOMACH AT LEAST 30 BEFORE 1ST MEAL OF THE DAY  3     LORazepam (ATIVAN) 1 MG tablet May take 1/2-1 tablet 30 minutes prior to departure for breast MRI.  Do not operate a  vehicle after taking this medication 2 tablet 0     LORazepam (ATIVAN) 1 MG tablet Take 30 minutes prior to departure.  Do not operate a vehicle after taking this medication 2 tablet 0     LORazepam (ATIVAN) 1 MG tablet Take 1 mg by mouth once  0     LORazepam (ATIVAN) 1 MG tablet Take 0.5 tablets (0.5 mg) by mouth every 8 hours as needed for anxiety (prior to breast MRI) Take 30 minutes prior to departure.  Do not operate a vehicle after taking this medication 2 tablet 0     metoclopramide (REGLAN) 5 MG tablet Take 1 tablet (5 mg) by mouth 3 times daily as needed 30 tablet 0     MONTELUKAST SODIUM PO Take 10 mg by mouth At Bedtime       NORTRIPTYLINE HCL PO Take 40 mg by mouth       prochlorperazine (COMPAZINE) 25 MG suppository UNW AND I 1 SUP REC BID PRN  0     promethazine (PHENERGAN) 12.5 MG tablet TK 1/2 TO 1 T PO BID PRF NAUSEA  2     SUMAtriptan (IMITREX) 20 MG/ACT nasal spray   2     vitamin D (ERGOCALCIFEROL) 01374 UNIT capsule TK 1 C PO 1 TIME WEEKLY  0        Family History:  Family History   Problem Relation Age of Onset     Breast Cancer Mother 52         at 52     Thyroid Disease Mother      Psychotic Disorder Mother      Obesity Mother      Lung Cancer Maternal Grandmother          at 70     Cerebrovascular Disease Maternal Grandfather      Diabetes Maternal Grandfather      Breast Cancer Paternal Grandmother 40        recurrence in 70s/recurrence in 70s,  at 70     Diabetes Father      Obesity Father      Psychotic Disorder Father      Hearing Loss Father      Colon Cancer Father 65     Breast Cancer Maternal Aunt 40         in 70s     Psychotic Disorder Sister      Colon Polyps Paternal Uncle         more than 10 colon polyps     Colon Cancer Cousin 50        maternal cousin     Cancer Paternal Aunt         unknown,  in 70s after reaction to chemo     Cancer Maternal Aunt 51        brain or bone cancer       Social History:  Social History     Socioeconomic History     Marital  status:      Spouse name: Sukumar     Number of children: 2     Years of education: Not on file     Highest education level: Not on file   Occupational History     Employer: [employer] PAR[employer]   Social Needs     Financial resource strain: Not on file     Food insecurity     Worry: Not on file     Inability: Not on file     Transportation needs     Medical: Not on file     Non-medical: Not on file   Tobacco Use     Smoking status: Never Smoker     Smokeless tobacco: Never Used   Substance and Sexual Activity     Alcohol use: Yes     Alcohol/week: 0.0 standard drinks     Comment: very rarely     Drug use: No     Sexual activity: Yes     Partners: Male     Birth control/protection: Condom   Lifestyle     Physical activity     Days per week: Not on file     Minutes per session: Not on file     Stress: Not on file   Relationships     Social connections     Talks on phone: Not on file     Gets together: Not on file     Attends Restorationism service: Not on file     Active member of club or organization: Not on file     Attends meetings of clubs or organizations: Not on file     Relationship status: Not on file     Intimate partner violence     Fear of current or ex partner: Not on file     Emotionally abused: Not on file     Physically abused: Not on file     Forced sexual activity: Not on file   Other Topics Concern     Parent/sibling w/ CABG, MI or angioplasty before 65F 55M? No      Service No     Blood Transfusions No     Caffeine Concern No     Occupational Exposure No     Hobby Hazards No     Sleep Concern No     Stress Concern Yes     Weight Concern Yes     Comment: would like to lose weight     Special Diet Yes     Comment: elimination dairy     Back Care Yes     Exercise No     Bike Helmet Not Asked     Comment: NA, don't ride     Seat Belt Yes     Self-Exams No   Social History Narrative     Not on file       Physical Exam:  There were no vitals taken for this visit.  GENERAL: Healthy, alert and no  distress  EYES: Eyes grossly normal to inspection.  No discharge or erythema, or obvious scleral/conjunctival abnormalities.  RESP: No audible wheeze, cough, or visible cyanosis.  No visible retractions or increased work of breathing.    SKIN: Visible skin clear. No significant rash, abnormal pigmentation or lesions.  NEURO: Cranial nerves grossly intact.  Mentation and speech appropriate for age.  PSYCH: Mentation appears normal, affect normal/bright, judgement and insight intact, normal speech and appearance well-groomed.  Laboratory/Imaging Studies  No results found for any visits on 05/21/21.    ASSESSMENT  We discussed her last screening tests and reviewed her interval history. She has intentionally lost 40 pounds in the last yea to achieve a weight of 163 pounds; her BMI is 24.9. She used Noom, adding in walking with her family on a daily basis and creating rewards for herself with exercise and dietary goals. She is planning to reward her family with a trip to [place] next year, as she meets her exercise milestone.    We discussed her breast health; she has no concerns today and will be having a clinical exam with her primary care provider in late August or early September.  At this point, she will proceed with the screening plan below.    Individualized Surveillance Plan for women  With 20% or greater lifetime risk of breast cancer   Per NCCN Breast Cancer Screening and Diagnosis Guidelines Version 1.2021   Recommended screening Test or procedure Last done Next Scheduled    Clinical encounter Clinical exam every 6-12 months.   Refer to genetic counseling if not already done.  Consider risk reduction strategies.   Exam deferred   Next exam with Primary care in August/Sept.      Return in May 2022   However, some family histories with breast cancers at a very young age, may warrant screening starting earlier.    *May begin at age 40 if breast cancers in the family occur at later ages.    Annual mammogram  beginning 10 years younger than the earliest breast cancer in the family but not prior to age 30.    Recommend annual breast MRI to begin 10 years younger than the earliest breast cancer in the family but not prior to age 25.    Breast MRIs are preferably done on day 7-15 of the menstrual cycle in premenopausal women. 12/18/2019- Screening tomosynthesis mammogram, BiRads1    11/11/2020- Breast MRI, BiRads3 for Probable benign background parenchymal enhancement at the 12:00  position of the left breast. Further evaluation with targeted ultrasound is recommended. If the ultrasound shows only normal tissue, routine high risk screening would be recommended.    11/17/2020- Left breast US follow up, BiRads2   5/21/2021- Screening mammogram after visit today    Breast MRI in November (11/12 or later)   Breast screening for patients at high risk due to thoracic radiation between the ages of 10-30   Annual clinical exam beginning 8 years after radiation therapy.    Annual screening mammogram beginning at age 30 or 8 years after radiation therapy    Annual breast MRI, beginning at age 25 or 8 years after radiation therapy.       NA     NA   Women who have a lifetime risk of >20% based on history of LCIS or ADH/ALH Annual screening mammogram beginning at age of LCIS or ADH/ALH but not prior to age 30.    Consider annual MRI to begin at age of diagnosis of LCIS or ADH/ALH but not prior to age 25.    Consider risk reducing strategies.     NA     NA    Recommend risk reducing strategies for women with 1.7% 5 year risk of breast cancer. 2.4% 5 year risk by Naty model        I spent a total of 48 minutes on the day of the visit. Please see the note for further information on patient assessment and treatment.    JENNIFER Guerrero-CNS, OCN, AGN-BC  Clinical Nurse Specialist  Cancer Risk Management Program  [facility] [address]  [place] [address]    phone:  [phone]  Pager:  [phone]  fax: [phone]    CC:  Ollie Loera PA-C

## 2021-05-27 ENCOUNTER — TELEPHONE (OUTPATIENT)
Dept: ONCOLOGY | Facility: CLINIC | Age: 46
End: 2021-05-27

## 2021-05-27 NOTE — TELEPHONE ENCOUNTER
5/27/21 - [facility] to call [phone] opt5, opt2 to schedule the following for Elena Aranda:    1.  MRI (11/12 or later) at clinic of her choice    2.  In person visit with Elena in May 2022 (5/6 or 5/20)    3.  Mammogram after visit with Elena in May (can be on the same day as visit)    -Paolo

## 2021-11-15 ENCOUNTER — HOSPITAL ENCOUNTER (OUTPATIENT)
Dept: MRI IMAGING | Facility: CLINIC | Age: 46
Discharge: HOME | End: 2021-11-15
Attending: CLINICAL NURSE SPECIALIST | Admitting: CLINICAL NURSE SPECIALIST
Payer: COMMERCIAL

## 2021-11-15 DIAGNOSIS — Z12.39 BREAST CANCER SCREENING, HIGH RISK PATIENT: ICD-10-CM

## 2021-11-15 DIAGNOSIS — R92.30 DENSE BREAST: ICD-10-CM

## 2021-11-15 PROCEDURE — 77049 MRI BREAST C-+ W/CAD BI: CPT

## 2021-11-15 PROCEDURE — A9585 GADOBUTROL INJECTION: HCPCS | Performed by: CLINICAL NURSE SPECIALIST

## 2021-11-15 PROCEDURE — 255N000002 HC RX 255 OP 636: Performed by: CLINICAL NURSE SPECIALIST

## 2021-11-15 RX ORDER — GADOBUTROL 604.72 MG/ML
10 INJECTION INTRAVENOUS ONCE
Status: COMPLETED | OUTPATIENT
Start: 2021-11-15 | End: 2021-11-15

## 2021-11-15 RX ADMIN — GADOBUTROL 9 ML: 604.72 INJECTION INTRAVENOUS at 12:45

## 2022-05-05 NOTE — PROGRESS NOTES
Oncology Risk Management Consultation:  Date on this visit: 2022    Maritza Hanson  requires high risk screening and surveillance for her risk of cancer secondary to having a family history of breast cancer in her mother at age 52,  maternal aunt in her 40s and paternal grandmother in her 40s. She has a 28% lifetime risk for breast cancer by the Evaristo model. She has a 2.4% risk for breast cancer within the next 5 years by the Naty model.      Primary Physician: Ollie Loera PA-C     History Of Present Illness:  Ms. Hanson is a very pleasant, healthy  46 year old female who presents with a family history of breast cancer.      Genetic Testin2015 - negative for genetic mutations in 25 genes using a My Risk panel through [employer]. Maritza was found to be negative for genetic mutations in: APC, MATTHEW, BARD1, BMPR1A, BRCA1, BRCA2, BRIP1, CDH1, CDK4, CDKN2A, CHEK2, EPCAM (large rearrangement only), MLH1, MSH2, MSH6, MUTYH, NBN, PALB2, PMS2, PTEN, RAD51C, RAD51D, SMAD4, STK11, and TP53 genes. No mutations were found in any of the 25 genes analyzed. This test involved sequencing and deletion/duplication analysis of all genes with the exception of EPCAM (deletions only).      Pertinent history:  Menarche at age 14.  First child at age 34.   Premenopausal.  Ovaries and uterus intact.Hx of tubal ligation.  Density: scattered fibroglandular densities  History of breastfeeding both of her sons, for 6 months and 10 months, respectively.   History of OCPs 10 years and used one dose of Depo-Provera; she was unable to tolerate the medication, as it heightened her anxiety.  No history of HRT  2018- History of MRI guided R core biopsy, normal breast tissue on pathology.  No history of hyperplasia, atypia or malignancy.     Breast imaging history:  Breast MRI and mammogram in  from [place]. No records available; she states they were normal.  2015 -Screening mammogram, BiRads 0 for asymmetry in  R breast  2015 - Diagnostic mammogram, R breast - BiRads1, fibroglandular asymmetry in upper R breast  2016 - Breast MRI, BiRads1  2016 - Screening tomosynthesis mammogram, BiRads1.  2017 - Breast MRI, BiRads1  2018-Screening mammogram, BiRads1.  2018 - Breast MRI, BiRads0 for Nonmasslike area of  enhancement in the deep right breast at about 12:00, 10 cm from the nipple. The area measures 2.9 x 2.1 x 2.0 cm.   2018 - Diagnostic tomosynthesis mammogram and right US, both BiRads4 suspicious.  2018-  MRI guided R core biopsy, normal breast tissue on pathology.  2019 - Breast MRI, BiRads2.  2019- Screening tomosynthesis mammogram, BiRads1  2020- Breast MRI, BiRads3 for Probable benign background parenchymal enhancement at the 12:00 position of the left breast. Further evaluation with targeted ultrasound is recommended. If the ultrasound shows only normal tissue, routine high risk screening would be recommended.  2020- Left breast US follow up, BiRads2  2021- Screening tomosynthesis mammogram, BiRads1  11/15/2021- Breast MRI, BiRads1     Other cancer screenin2016 - Colonoscopy for family history of colon cancer in father at age 65, paternal cousin with colon cancer in 50s. And paternal uncle with 10+ colon polyps. Results: 7 hyperplastic plastic polyps removed (2 in ascending and 5 in sigmoid), and 1 tubular adenoma removed from descending colon.      2019 - Colonoscopy, 2 mm tubular adenoma in cecum, 2 mm tubular adenoma in ascending colon, 2 mm hyperplastic polyp in sigmoid colon. Mild patchy erythema with a few small linear erosions in distal rectum, suggestive of mechanical injury. Follow up due in      At this visit, she denies new fatigue, breast pain, asymmetry, lumps, masses, thickening, pain, nipple discharge and skin changes in her breasts.     Past Medical/Surgical History:  Past Medical History:   Diagnosis Date      Anemia during pregnancy and now     Asthma      Back pain      Depression with anxiety since teenage     History of tics      Mild preeclampsia     during both pregnancy, resolved     POTS (postural orthostatic tachycardia syndrome)      Past Surgical History:   Procedure Laterality Date     CHOLECYSTECTOMY, LAPOROSCOPIC  12/2017     removal of sesimoid bone right foot   1994     wisdom teeth extraction  1998       Allergies:  Allergies as of 05/06/2022 - Reviewed 05/06/2022   Allergen Reaction Noted     Dogs  12/13/2019     Dust mites Itching 05/21/2021     Grass  12/13/2019     Hydrocodone Itching 11/13/2012     Mold  12/13/2019     Sulfa drugs Hives 11/13/2012     Trees  12/13/2019     Zofran [ondansetron] Itching 05/16/2018     Cats Itching and Rash 05/21/2021       Current Medications:  Current Outpatient Medications   Medication Sig Dispense Refill     Cetirizine HCl (ZYRTEC ALLERGY PO) Take 10 mg by mouth 2 times daily       EPINEPHrine (EPIPEN/ADRENACLICK/OR ANY BX GENERIC EQUIV) 0.3 MG/0.3ML injection 2-pack 1 mg  0     Esomeprazole Magnesium (NEXIUM PO)        gabapentin (NEURONTIN) 300 MG capsule Take 3 capsules by mouth       hydrOXYzine (VISTARIL) 25 MG capsule TK 1 TO 2 CS PO Q 4 TO 6 HOURS PRN  0     NORTRIPTYLINE HCL PO Take 40 mg by mouth       prochlorperazine (COMPAZINE) 25 MG suppository UNW AND I 1 SUP REC BID PRN  0     SUMAtriptan (IMITREX) 20 MG/ACT nasal spray   2     atorvastatin (LIPITOR) 10 MG tablet Take 10 mg by mouth (Patient not taking: Reported on 5/6/2022)       dicyclomine (BENTYL) 10 MG capsule TK 1-2 CS PO QID PRF ABDOMINAL PAIN (Patient not taking: Reported on 5/6/2022)  2     hyoscyamine (LEVSIN/SL) 0.125 MG sublingual tablet TK 1 T PO SUBLINGUAL ROUTE QID (Patient not taking: Reported on 5/6/2022)  3     LINZESS 145 MCG capsule TK 1 C PO D ON AN EMPTY STOMACH AT LEAST 30 BEFORE 1ST MEAL OF THE DAY (Patient not taking: Reported on 5/6/2022)  3     LORazepam (ATIVAN) 1 MG tablet  Take 1 tablet (1 mg) by mouth every 8 hours as needed for anxiety (prior to breast MRI) Take 30 minutes prior to departure.  Do not operate a vehicle after taking this medication (Patient not taking: Reported on 2022) 1 tablet 0     LORazepam (ATIVAN) 1 MG tablet Take 1 mg by mouth once (Patient not taking: Reported on 2022)  0     LORazepam (ATIVAN) 1 MG tablet Take 0.5 tablets (0.5 mg) by mouth every 8 hours as needed for anxiety (prior to breast MRI) Take 30 minutes prior to departure.  Do not operate a vehicle after taking this medication (Patient not taking: Reported on 2022) 2 tablet 0     metoclopramide (REGLAN) 5 MG tablet Take 1 tablet (5 mg) by mouth 3 times daily as needed (Patient not taking: Reported on 2022) 30 tablet 0     promethazine (PHENERGAN) 12.5 MG tablet TK 1/2 TO 1 T PO BID PRF NAUSEA (Patient not taking: Reported on 2022)  2        Family History:  Family History   Problem Relation Age of Onset     Breast Cancer Mother 52         at 52     Thyroid Disease Mother      Psychotic Disorder Mother      Obesity Mother      Diabetes Father      Obesity Father      Psychotic Disorder Father      Hearing Loss Father      Colon Cancer Father 65        cause of death     Psychotic Disorder Sister      Lung Cancer Maternal Grandmother          at 70     Cerebrovascular Disease Maternal Grandfather      Diabetes Maternal Grandfather      Breast Cancer Paternal Grandmother 40        recurrence in 70s/recurrence in 70s,  at 70     Breast Cancer Maternal Aunt 40         in 70s     Cancer Maternal Aunt 51        brain or bone cancer     Cancer Paternal Aunt         unknown,  in 70s after reaction to chemo     Colon Polyps Paternal Uncle         more than 10 colon polyps     Other - See Comments Paternal Uncle         3 deaths with CoVid     Colon Cancer Cousin 50        maternal cousin       Social History:  Social History     Socioeconomic History     Marital status:       Spouse name: Sukumar     Number of children: 2     Years of education: Not on file     Highest education level: Not on file   Occupational History     Employer: [employer]   Tobacco Use     Smoking status: Never Smoker     Smokeless tobacco: Never Used   Substance and Sexual Activity     Alcohol use: Yes     Alcohol/week: 0.0 standard drinks     Comment: very rarely     Drug use: No     Sexual activity: Yes     Partners: Male     Birth control/protection: Condom   Other Topics Concern     Parent/sibling w/ CABG, MI or angioplasty before 65F 55M? No      Service No     Blood Transfusions No     Caffeine Concern No     Occupational Exposure No     Hobby Hazards No     Sleep Concern No     Stress Concern Yes     Weight Concern Yes     Comment: would like to lose weight     Special Diet Yes     Comment: elimination dairy     Back Care Yes     Exercise No     Bike Helmet Not Asked     Comment: NA, don't ride     Seat Belt Yes     Self-Exams No   Social History Narrative     Not on file     Social Determinants of Health     Financial Resource Strain: Not on file   Food Insecurity: Not on file   Transportation Needs: Not on file   Physical Activity: Not on file   Stress: Not on file   Social Connections: Not on file   Intimate Partner Violence: Not on file   Housing Stability: Not on file       Physical Exam:  /82 (Cuff Size: Adult Regular)   Pulse 103   Temp 98.1  F (36.7  C) (Oral)   Wt 82 kg (180 lb 11.2 oz)   SpO2 96%   BMI 27.48 kg/m      GENERAL APPEARANCE: healthy, alert, appropriately tearful at times.     NECK: no adenopathy, no asymmetry or masses     RESP: lungs clear to auscultation - no rales, rhonchi or wheezes    BREAST: A multipositional, bilateral breast exam was performed.  Fairly symmetrical -Lsl>R. Right breast: no palpable dominant masses, no nipple discharge, no skin changes. Dense tissue.  Right axilla: no palpable adenopathy. Left breast: no palpable dominant  masses, no nipple discharge, no skin changes. Left axilla: no palpable adenopathy. Dense tissue.    LYMPHATICS: No cervical, supraclavicular or axillary lymphadenopathy     CARDIOVASCULAR: regular rate and rhythm, normal S1 S2, no S3 or S4 and no murmur. Pulses +1 in all extremities       SKIN: no suspicious lesions or rashes    Laboratory/Imaging Studies  No results found for any visits on 22.    ASSESSMENT  We discussed her last screening tests and reviewed results.  She has been well during the interim, although she has had 3 deaths in her family due to CoVid and her father  in August from colon cancer. She continues to walk and watch her weight and has been successful in  Reducing her BMI from 30 in 2017 to 28 today. She has lost 21 pounds since 2019 and sustained the intentional weight loss using Noom and by changing her diet and increasing her exercise.   She had no breast complaints today and is practicing self exams at home.  She will proceed with the following plan.   Individualized Surveillance Plan for women  With 20% or greater lifetime risk of breast cancer   Per NCCN Breast Cancer Screening and Diagnosis Guidelines Version 1.   Recommended screening Test or procedure Last done Next Scheduled    Clinical encounter Clinical exam every 6-12 months.   Refer to genetic counseling if not already done.  Consider risk reduction strategies.   2022   May 2023   However, some family histories with breast cancers at a very young age, may warrant screening starting earlier.    *May begin at age 40 if breast cancers in the family occur at later ages.    Annual mammogram beginning 10 years younger than the earliest breast cancer in the family but not prior to age 30.    Recommend annual breast MRI to begin 10 years younger than the earliest breast cancer in the family but not prior to age 25.    Breast MRIs are preferably done on day 7-15 of the menstrual cycle in premenopausal women. 2021-  Screening tomosynthesis mammogram, BiRads1    11/15/2021- Breast MRI, BiRads1 Mammogram at 1 p.m. today    Breast MRI in November (11/16 or later)    Exam in one year followed by mammogram   Breast screening for patients at high risk due to thoracic radiation between the ages of 10-30   Annual clinical exam beginning 8 years after radiation therapy.    Annual screening mammogram beginning at age 30 or 8 years after radiation therapy    Annual breast MRI, beginning at age 25 or 8 years after radiation therapy.       NA     NA   Women who have a lifetime risk of >20% based on history of LCIS or ADH/ALH Annual screening mammogram beginning at age of LCIS or ADH/ALH but not prior to age 30.    Consider annual MRI to begin at age of diagnosis of LCIS or ADH/ALH but not prior to age 25.    Consider risk reducing strategies.     NA     NA    Recommend risk reducing strategies for women with 1.7% 5 year risk of breast cancer.       I spent a total of 39 minutes on the day of the visit. Please see the note for further information on patient assessment and treatment.    JENNIFER Guerrero-CNS, OCN, AGN-BC  Clinical Nurse Specialist  Cancer Risk Management Program  [facility]      CC: Ollie Loera MD

## 2022-05-06 ENCOUNTER — ONCOLOGY VISIT (OUTPATIENT)
Dept: ONCOLOGY | Facility: CLINIC | Age: 47
End: 2022-05-06
Attending: CLINICAL NURSE SPECIALIST
Payer: COMMERCIAL

## 2022-05-06 ENCOUNTER — ANCILLARY PROCEDURE (OUTPATIENT)
Dept: MAMMOGRAPHY | Facility: CLINIC | Age: 47
End: 2022-05-06
Attending: CLINICAL NURSE SPECIALIST
Payer: COMMERCIAL

## 2022-05-06 VITALS
BODY MASS INDEX: 27.48 KG/M2 | DIASTOLIC BLOOD PRESSURE: 82 MMHG | OXYGEN SATURATION: 96 % | SYSTOLIC BLOOD PRESSURE: 134 MMHG | WEIGHT: 180.7 LBS | TEMPERATURE: 98.1 F | HEART RATE: 103 BPM

## 2022-05-06 DIAGNOSIS — Z12.39 BREAST CANCER SCREENING, HIGH RISK PATIENT: ICD-10-CM

## 2022-05-06 DIAGNOSIS — Z12.39 BREAST CANCER SCREENING, HIGH RISK PATIENT: Primary | ICD-10-CM

## 2022-05-06 DIAGNOSIS — R92.30 DENSE BREAST: ICD-10-CM

## 2022-05-06 DIAGNOSIS — Z80.3 FAMILY HISTORY OF MALIGNANT NEOPLASM OF BREAST: ICD-10-CM

## 2022-05-06 PROCEDURE — 77063 BREAST TOMOSYNTHESIS BI: CPT | Mod: GC | Performed by: RADIOLOGY

## 2022-05-06 PROCEDURE — 77067 SCR MAMMO BI INCL CAD: CPT | Mod: GC | Performed by: RADIOLOGY

## 2022-05-06 PROCEDURE — 99214 OFFICE O/P EST MOD 30 MIN: CPT | Performed by: CLINICAL NURSE SPECIALIST

## 2022-05-06 PROCEDURE — G0463 HOSPITAL OUTPT CLINIC VISIT: HCPCS

## 2022-05-06 ASSESSMENT — PAIN SCALES - GENERAL: PAINLEVEL: NO PAIN (0)

## 2022-05-06 NOTE — LETTER
2022         RE: Maritza Hanson  [address]  [facility] [address]        Dear Colleague,    Thank you for referring your patient, Maritza Hanson, to the [facility]. Please see a copy of my visit note below.    Oncology Risk Management Consultation:  Date on this visit: 2022    Maritza Hanson  requires high risk screening and surveillance for her risk of cancer secondary to having a family history of breast cancer in her mother at age 52,  maternal aunt in her 40s and paternal grandmother in her 40s. She has a 28% lifetime risk for breast cancer by the Evaristo model. She has a 2.4% risk for breast cancer within the next 5 years by the Naty model.      Primary Physician: Ollie Loera PA-C     History Of Present Illness:  Ms. Hanson is a very pleasant, healthy  46 year old female who presents with a family history of breast cancer.      Genetic Testin2015 - negative for genetic mutations in 25 genes using a My Risk panel through [facility]. Maritza was found to be negative for genetic mutations in: APC, MATTHEW, BARD1, BMPR1A, BRCA1, BRCA2, BRIP1, CDH1, CDK4, CDKN2A, CHEK2, EPCAM (large rearrangement only), MLH1, MSH2, MSH6, MUTYH, NBN, PALB2, PMS2, PTEN, RAD51C, RAD51D, SMAD4, STK11, and TP53 genes. No mutations were found in any of the 25 genes analyzed. This test involved sequencing and deletion/duplication analysis of all genes with the exception of EPCAM (deletions only).      Pertinent history:  Menarche at age 14.  First child at age 34.   Premenopausal.  Ovaries and uterus intact.Hx of tubal ligation.  Density: scattered fibroglandular densities  History of breastfeeding both of her sons, for 6 months and 10 months, respectively.   History of OCPs 10 years and used one dose of Depo-Provera; she was unable to tolerate the medication, as it heightened her anxiety.  No history of HRT  2018- History of MRI guided R core biopsy,  normal breast tissue on pathology.  No history of hyperplasia, atypia or malignancy.     Breast imaging history:  Breast MRI and mammogram in  from [place]. No records available; she states they were normal.  2015 -Screening mammogram, BiRads 0 for asymmetry in R breast  2015 - Diagnostic mammogram, R breast - BiRads1, fibroglandular asymmetry in upper R breast  2016 - Breast MRI, BiRads1  2016 - Screening tomosynthesis mammogram, BiRads1.  2017 - Breast MRI, BiRads1  2018-Screening mammogram, BiRads1.  2018 - Breast MRI, BiRads0 for Nonmasslike area of  enhancement in the deep right breast at about 12:00, 10 cm from the nipple. The area measures 2.9 x 2.1 x 2.0 cm.   2018 - Diagnostic tomosynthesis mammogram and right US, both BiRads4 suspicious.  2018-  MRI guided R core biopsy, normal breast tissue on pathology.  2019 - Breast MRI, BiRads2.  2019- Screening tomosynthesis mammogram, BiRads1  2020- Breast MRI, BiRads3 for Probable benign background parenchymal enhancement at the 12:00 position of the left breast. Further evaluation with targeted ultrasound is recommended. If the ultrasound shows only normal tissue, routine high risk screening would be recommended.  2020- Left breast US follow up, BiRads2  2021- Screening tomosynthesis mammogram, BiRads1  11/15/2021- Breast MRI, BiRads1     Other cancer screenin2016 - Colonoscopy for family history of colon cancer in father at age 65, paternal cousin with colon cancer in 50s. And paternal uncle with 10+ colon polyps. Results: 7 hyperplastic plastic polyps removed (2 in ascending and 5 in sigmoid), and 1 tubular adenoma removed from descending colon.      2019 - Colonoscopy, 2 mm tubular adenoma in cecum, 2 mm tubular adenoma in ascending colon, 2 mm hyperplastic polyp in sigmoid colon. Mild patchy erythema with a few small linear erosions in distal rectum, suggestive of  mechanical injury. Follow up due in 2023     At this visit, she denies new fatigue, breast pain, asymmetry, lumps, masses, thickening, pain, nipple discharge and skin changes in her breasts.     Past Medical/Surgical History:  Past Medical History:   Diagnosis Date     Anemia during pregnancy and now     Asthma      Back pain      Depression with anxiety since teenage     History of tics      Mild preeclampsia     during both pregnancy, resolved     POTS (postural orthostatic tachycardia syndrome)      Past Surgical History:   Procedure Laterality Date     CHOLECYSTECTOMY, LAPOROSCOPIC  12/2017     removal of sesimoid bone right foot   1994     wisdom teeth extraction  1998       Allergies:  Allergies as of 05/06/2022 - Reviewed 05/06/2022   Allergen Reaction Noted     Dogs  12/13/2019     Dust mites Itching 05/21/2021     Grass  12/13/2019     Hydrocodone Itching 11/13/2012     Mold  12/13/2019     Sulfa drugs Hives 11/13/2012     Trees  12/13/2019     Zofran [ondansetron] Itching 05/16/2018     Cats Itching and Rash 05/21/2021       Current Medications:  Current Outpatient Medications   Medication Sig Dispense Refill     Cetirizine HCl (ZYRTEC ALLERGY PO) Take 10 mg by mouth 2 times daily       EPINEPHrine (EPIPEN/ADRENACLICK/OR ANY BX GENERIC EQUIV) 0.3 MG/0.3ML injection 2-pack 1 mg  0     Esomeprazole Magnesium (NEXIUM PO)        gabapentin (NEURONTIN) 300 MG capsule Take 3 capsules by mouth       hydrOXYzine (VISTARIL) 25 MG capsule TK 1 TO 2 CS PO Q 4 TO 6 HOURS PRN  0     NORTRIPTYLINE HCL PO Take 40 mg by mouth       prochlorperazine (COMPAZINE) 25 MG suppository UNW AND I 1 SUP REC BID PRN  0     SUMAtriptan (IMITREX) 20 MG/ACT nasal spray   2     atorvastatin (LIPITOR) 10 MG tablet Take 10 mg by mouth (Patient not taking: Reported on 5/6/2022)       dicyclomine (BENTYL) 10 MG capsule TK 1-2 CS PO QID PRF ABDOMINAL PAIN (Patient not taking: Reported on 5/6/2022)  2     hyoscyamine (LEVSIN/SL) 0.125 MG  sublingual tablet TK 1 T PO SUBLINGUAL ROUTE QID (Patient not taking: Reported on 2022)  3     LINZESS 145 MCG capsule TK 1 C PO D ON AN EMPTY STOMACH AT LEAST 30 BEFORE 1ST MEAL OF THE DAY (Patient not taking: Reported on 2022)  3     LORazepam (ATIVAN) 1 MG tablet Take 1 tablet (1 mg) by mouth every 8 hours as needed for anxiety (prior to breast MRI) Take 30 minutes prior to departure.  Do not operate a vehicle after taking this medication (Patient not taking: Reported on 2022) 1 tablet 0     LORazepam (ATIVAN) 1 MG tablet Take 1 mg by mouth once (Patient not taking: Reported on 2022)  0     LORazepam (ATIVAN) 1 MG tablet Take 0.5 tablets (0.5 mg) by mouth every 8 hours as needed for anxiety (prior to breast MRI) Take 30 minutes prior to departure.  Do not operate a vehicle after taking this medication (Patient not taking: Reported on 2022) 2 tablet 0     metoclopramide (REGLAN) 5 MG tablet Take 1 tablet (5 mg) by mouth 3 times daily as needed (Patient not taking: Reported on 2022) 30 tablet 0     promethazine (PHENERGAN) 12.5 MG tablet TK  TO 1 T PO BID PRF NAUSEA (Patient not taking: Reported on 2022)  2        Family History:  Family History   Problem Relation Age of Onset     Breast Cancer Mother 52         at 52     Thyroid Disease Mother      Psychotic Disorder Mother      Obesity Mother      Diabetes Father      Obesity Father      Psychotic Disorder Father      Hearing Loss Father      Colon Cancer Father 65        cause of death     Psychotic Disorder Sister      Lung Cancer Maternal Grandmother          at 70     Cerebrovascular Disease Maternal Grandfather      Diabetes Maternal Grandfather      Breast Cancer Paternal Grandmother 40        recurrence in 70s/recurrence in 70s,  at 70     Breast Cancer Maternal Aunt 40         in 70s     Cancer Maternal Aunt 51        brain or bone cancer     Cancer Paternal Aunt         unknown,  in 70s after reaction to  chemo     Colon Polyps Paternal Uncle         more than 10 colon polyps     Other - See Comments Paternal Uncle         3 deaths with CoVid     Colon Cancer Cousin 50        maternal cousin       Social History:  Social History     Socioeconomic History     Marital status:      Spouse name: Sukumar     Number of children: 2     Years of education: Not on file     Highest education level: Not on file   Occupational History     Employer: [employer]   Tobacco Use     Smoking status: Never Smoker     Smokeless tobacco: Never Used   Substance and Sexual Activity     Alcohol use: Yes     Alcohol/week: 0.0 standard drinks     Comment: very rarely     Drug use: No     Sexual activity: Yes     Partners: Male     Birth control/protection: Condom   Other Topics Concern     Parent/sibling w/ CABG, MI or angioplasty before 65F 55M? No      Service No     Blood Transfusions No     Caffeine Concern No     Occupational Exposure No     Hobby Hazards No     Sleep Concern No     Stress Concern Yes     Weight Concern Yes     Comment: would like to lose weight     Special Diet Yes     Comment: elimination dairy     Back Care Yes     Exercise No     Bike Helmet Not Asked     Comment: NA, don't ride     Seat Belt Yes     Self-Exams No   Social History Narrative     Not on file     Social Determinants of Health     Financial Resource Strain: Not on file   Food Insecurity: Not on file   Transportation Needs: Not on file   Physical Activity: Not on file   Stress: Not on file   Social Connections: Not on file   Intimate Partner Violence: Not on file   Housing Stability: Not on file       Physical Exam:  /82 (Cuff Size: Adult Regular)   Pulse 103   Temp 98.1  F (36.7  C) (Oral)   Wt 82 kg (180 lb 11.2 oz)   SpO2 96%   BMI 27.48 kg/m      GENERAL APPEARANCE: healthy, alert, appropriately tearful at times.     NECK: no adenopathy, no asymmetry or masses     RESP: lungs clear to auscultation - no rales, rhonchi or  wheezes    BREAST: A multipositional, bilateral breast exam was performed.  Fairly symmetrical -Lsl>R. Right breast: no palpable dominant masses, no nipple discharge, no skin changes. Dense tissue.  Right axilla: no palpable adenopathy. Left breast: no palpable dominant masses, no nipple discharge, no skin changes. Left axilla: no palpable adenopathy. Dense tissue.    LYMPHATICS: No cervical, supraclavicular or axillary lymphadenopathy     CARDIOVASCULAR: regular rate and rhythm, normal S1 S2, no S3 or S4 and no murmur. Pulses +1 in all extremities       SKIN: no suspicious lesions or rashes    Laboratory/Imaging Studies  No results found for any visits on 22.    ASSESSMENT  We discussed her last screening tests and reviewed results.  She has been well during the interim, although she has had 3 deaths in her family due to CoVid and her father  in August from colon cancer. She continues to walk and watch her weight and has been successful in  Reducing her BMI from 30 in 2017 to 28 today. She has lost 21 pounds since 2019 and sustained the intentional weight loss using Noom and by changing her diet and increasing her exercise.   She had no breast complaints today and is practicing self exams at home.  She will proceed with the following plan.   Individualized Surveillance Plan for women  With 20% or greater lifetime risk of breast cancer   Per NCCN Breast Cancer Screening and Diagnosis Guidelines Version 1.2021   Recommended screening Test or procedure Last done Next Scheduled    Clinical encounter Clinical exam every 6-12 months.   Refer to genetic counseling if not already done.  Consider risk reduction strategies.   2022   May 2023   However, some family histories with breast cancers at a very young age, may warrant screening starting earlier.    *May begin at age 40 if breast cancers in the family occur at later ages.    Annual mammogram beginning 10 years younger than the earliest breast cancer in  the family but not prior to age 30.    Recommend annual breast MRI to begin 10 years younger than the earliest breast cancer in the family but not prior to age 25.    Breast MRIs are preferably done on day 7-15 of the menstrual cycle in premenopausal women. 05/21/2021- Screening tomosynthesis mammogram, BiRads1    11/15/2021- Breast MRI, BiRads1 Mammogram at 1 p.m. today    Breast MRI in November (11/16 or later)    Exam in one year followed by mammogram   Breast screening for patients at high risk due to thoracic radiation between the ages of 10-30   Annual clinical exam beginning 8 years after radiation therapy.    Annual screening mammogram beginning at age 30 or 8 years after radiation therapy    Annual breast MRI, beginning at age 25 or 8 years after radiation therapy.       NA     NA   Women who have a lifetime risk of >20% based on history of LCIS or ADH/ALH Annual screening mammogram beginning at age of LCIS or ADH/ALH but not prior to age 30.    Consider annual MRI to begin at age of diagnosis of LCIS or ADH/ALH but not prior to age 25.    Consider risk reducing strategies.     NA     NA    Recommend risk reducing strategies for women with 1.7% 5 year risk of breast cancer.       I spent a total of 39 minutes on the day of the visit. Please see the note for further information on patient assessment and treatment.    JENNIFER Guerrero-CNS, OCN, AGN-BC  Clinical Nurse Specialist  Cancer Risk Management Program  [facility]      CC: Ollie Loera MD

## 2022-05-06 NOTE — PATIENT INSTRUCTIONS
Individualized Surveillance Plan for women  With 20% or greater lifetime risk of breast cancer   Per NCCN Breast Cancer Screening and Diagnosis Guidelines Version 1.2021   Recommended screening Test or procedure Last done Next Scheduled    Clinical encounter Clinical exam every 6-12 months.   Refer to genetic counseling if not already done.  Consider risk reduction strategies.   5/6/2022   May 2023   However, some family histories with breast cancers at a very young age, may warrant screening starting earlier.    *May begin at age 40 if breast cancers in the family occur at later ages.    Annual mammogram beginning 10 years younger than the earliest breast cancer in the family but not prior to age 30.    Recommend annual breast MRI to begin 10 years younger than the earliest breast cancer in the family but not prior to age 25.    Breast MRIs are preferably done on day 7-15 of the menstrual cycle in premenopausal women. 05/21/2021- Screening tomosynthesis mammogram, BiRads1  11/15/2021- Breast MRI, BiRads1 Mammogram at 1 p.m. today    Breast MRI in November (11/16 or later)   Breast screening for patients at high risk due to thoracic radiation between the ages of 10-30   Annual clinical exam beginning 8 years after radiation therapy.    Annual screening mammogram beginning at age 30 or 8 years after radiation therapy    Annual breast MRI, beginning at age 25 or 8 years after radiation therapy.       NA     NA   Women who have a lifetime risk of >20% based on history of LCIS or ADH/ALH Annual screening mammogram beginning at age of LCIS or ADH/ALH but not prior to age 30.    Consider annual MRI to begin at age of diagnosis of LCIS or ADH/ALH but not prior to age 25.    Consider risk reducing strategies.     NA     NA    Recommend risk reducing strategies for women with 1.7% 5 year risk of breast cancer.       .

## 2022-05-06 NOTE — NURSING NOTE
"2Oncology Rooming Note    May 6, 2022 12:28 PM   Maritza Hanson is a 46 year old adult who presents for:    Chief Complaint   Patient presents with     Oncology Clinic Visit     Rtn Unspecified acute conjunctivitis (bilaterally) and AUB; 1y Follow Up     Initial Vitals: /82 (Cuff Size: Adult Regular)   Pulse 103   Temp 98.1  F (36.7  C) (Oral)   Wt 82 kg (180 lb 11.2 oz)   SpO2 96%   BMI 27.48 kg/m   Estimated body mass index is 27.48 kg/m  as calculated from the following:    Height as of 7/6/18: 1.727 m (5' 8\").    Weight as of this encounter: 82 kg (180 lb 11.2 oz). Body surface area is 1.98 meters squared.  No Pain (0)   No LMP recorded.  Allergies reviewed: Yes  Medications reviewed: Yes    Medications: Medication refills not needed today.  [facility] entered into [id number] - [place], [place] - [address]    Clinical concerns: Provider started visit with Pt.       Kamila Harris, EMT on May 6, 2022 at 12:30 PM          "

## 2022-05-14 ENCOUNTER — HEALTH MAINTENANCE LETTER (OUTPATIENT)
Age: 47
End: 2022-05-14

## 2022-09-03 ENCOUNTER — HEALTH MAINTENANCE LETTER (OUTPATIENT)
Age: 47
End: 2022-09-03

## 2022-11-16 ENCOUNTER — HOSPITAL ENCOUNTER (OUTPATIENT)
Dept: MRI IMAGING | Facility: CLINIC | Age: 47
Discharge: HOME | End: 2022-11-16
Attending: CLINICAL NURSE SPECIALIST | Admitting: CLINICAL NURSE SPECIALIST
Payer: COMMERCIAL

## 2022-11-16 DIAGNOSIS — Z12.39 BREAST CANCER SCREENING, HIGH RISK PATIENT: ICD-10-CM

## 2022-11-16 DIAGNOSIS — R92.30 DENSE BREAST: ICD-10-CM

## 2022-11-16 DIAGNOSIS — Z80.3 FAMILY HISTORY OF MALIGNANT NEOPLASM OF BREAST: ICD-10-CM

## 2022-11-16 PROCEDURE — 77049 MRI BREAST C-+ W/CAD BI: CPT

## 2022-11-16 PROCEDURE — 255N000002 HC RX 255 OP 636: Performed by: CLINICAL NURSE SPECIALIST

## 2022-11-16 PROCEDURE — A9585 GADOBUTROL INJECTION: HCPCS | Performed by: CLINICAL NURSE SPECIALIST

## 2022-11-16 RX ORDER — GADOBUTROL 604.72 MG/ML
8 INJECTION INTRAVENOUS ONCE
Status: COMPLETED | OUTPATIENT
Start: 2022-11-16 | End: 2022-11-16

## 2022-11-16 RX ADMIN — GADOBUTROL 8 ML: 604.72 INJECTION INTRAVENOUS at 10:32

## 2023-05-30 ENCOUNTER — ONCOLOGY VISIT (OUTPATIENT)
Dept: ONCOLOGY | Facility: CLINIC | Age: 48
End: 2023-05-30
Attending: CLINICAL NURSE SPECIALIST
Payer: COMMERCIAL

## 2023-05-30 ENCOUNTER — ANCILLARY PROCEDURE (OUTPATIENT)
Dept: MAMMOGRAPHY | Facility: CLINIC | Age: 48
End: 2023-05-30
Attending: CLINICAL NURSE SPECIALIST
Payer: COMMERCIAL

## 2023-05-30 VITALS
RESPIRATION RATE: 16 BRPM | DIASTOLIC BLOOD PRESSURE: 73 MMHG | HEART RATE: 102 BPM | TEMPERATURE: 98.2 F | BODY MASS INDEX: 28.33 KG/M2 | OXYGEN SATURATION: 97 % | SYSTOLIC BLOOD PRESSURE: 104 MMHG | WEIGHT: 186.3 LBS

## 2023-05-30 DIAGNOSIS — Z80.3 FAMILY HISTORY OF MALIGNANT NEOPLASM OF BREAST: ICD-10-CM

## 2023-05-30 DIAGNOSIS — R92.30 DENSE BREAST: ICD-10-CM

## 2023-05-30 DIAGNOSIS — Z12.39 BREAST CANCER SCREENING, HIGH RISK PATIENT: ICD-10-CM

## 2023-05-30 DIAGNOSIS — Z12.39 BREAST CANCER SCREENING, HIGH RISK PATIENT: Primary | ICD-10-CM

## 2023-05-30 PROBLEM — K31.7 POLYP OF DUODENUM: Status: ACTIVE | Noted: 2017-12-14

## 2023-05-30 PROBLEM — R79.89 ABNORMAL LIVER FUNCTION TESTS: Status: ACTIVE | Noted: 2017-12-06

## 2023-05-30 PROBLEM — R12 HEARTBURN: Status: ACTIVE | Noted: 2017-11-30

## 2023-05-30 PROBLEM — K76.0 STEATOSIS OF LIVER: Status: ACTIVE | Noted: 2018-04-03

## 2023-05-30 PROBLEM — K59.01 SLOW TRANSIT CONSTIPATION: Status: ACTIVE | Noted: 2018-08-02

## 2023-05-30 PROBLEM — R10.9 NONSPECIFIC ABDOMINAL PAIN: Status: ACTIVE | Noted: 2017-12-12

## 2023-05-30 PROBLEM — Z86.0100 HISTORY OF COLONIC POLYPS: Status: ACTIVE | Noted: 2019-05-28

## 2023-05-30 PROBLEM — R10.13 EPIGASTRIC PAIN: Status: ACTIVE | Noted: 2017-11-30

## 2023-05-30 PROBLEM — R11.15 CYCLICAL VOMITING SYNDROME: Status: ACTIVE | Noted: 2018-07-13

## 2023-05-30 PROBLEM — D12.2 BENIGN NEOPLASM OF ASCENDING COLON: Status: ACTIVE | Noted: 2019-05-30

## 2023-05-30 PROBLEM — D12.0 BENIGN NEOPLASM OF CECUM: Status: ACTIVE | Noted: 2019-05-30

## 2023-05-30 PROBLEM — R11.14 BILIOUS VOMITING: Status: ACTIVE | Noted: 2017-11-30

## 2023-05-30 PROBLEM — K83.8 BILIARY SLUDGE: Status: ACTIVE | Noted: 2017-11-30

## 2023-05-30 PROCEDURE — 99214 OFFICE O/P EST MOD 30 MIN: CPT | Performed by: CLINICAL NURSE SPECIALIST

## 2023-05-30 PROCEDURE — 77067 SCR MAMMO BI INCL CAD: CPT | Performed by: STUDENT IN AN ORGANIZED HEALTH CARE EDUCATION/TRAINING PROGRAM

## 2023-05-30 PROCEDURE — 77063 BREAST TOMOSYNTHESIS BI: CPT | Performed by: STUDENT IN AN ORGANIZED HEALTH CARE EDUCATION/TRAINING PROGRAM

## 2023-05-30 PROCEDURE — G0463 HOSPITAL OUTPT CLINIC VISIT: HCPCS | Performed by: CLINICAL NURSE SPECIALIST

## 2023-05-30 ASSESSMENT — PAIN SCALES - GENERAL: PAINLEVEL: NO PAIN (0)

## 2023-05-30 NOTE — LETTER
2023         RE: Maritza Hanson  [id number]  [place] [id number]        Dear Colleague,    Thank you for referring your patient, Maritza Hanson, to the [facility]. Please see a copy of my visit note below.    Oncology Risk Management Consultation:  Date on this visit: 2023    Maritza Hanson  requires high risk screening and surveillance for her risk of cancer secondary to having a family history of breast cancer in her mother at age 52,  maternal aunt in her 40s and paternal grandmother in her 40s. She has a 28% lifetime risk for breast cancer by the Evaristo model. She has a 2.4% risk for breast cancer within the next 5 years by the Naty model.      Primary Physician: Ollie Loera PA-C     History Of Present Illness:  Ms. Hanson is a very pleasant, healthy  47 year old female who presents with a family history of breast cancer.      Genetic Testin2015 - negative for genetic mutations in 25 genes using a My Risk panel through [employer]. Maritza was found to be negative for genetic mutations in: APC, MATTHEW, BARD1, BMPR1A, BRCA1, BRCA2, BRIP1, CDH1, CDK4, CDKN2A, CHEK2, EPCAM (large rearrangement only), MLH1, MSH2, MSH6, MUTYH, NBN, PALB2, PMS2, PTEN, RAD51C, RAD51D, SMAD4, STK11, and TP53 genes. No mutations were found in any of the 25 genes analyzed. This test involved sequencing and deletion/duplication analysis of all genes with the exception of EPCAM (deletions only).      Pertinent history:  Menarche at age 14.  First child at age 34.   Premenopausal.  Ovaries and uterus intact.Hx of tubal ligation.  Density: scattered fibroglandular densities  History of breastfeeding both of her sons, for 6 months and 10 months, respectively.   History of OCPs 10 years and used one dose of Depo-Provera; she was unable to tolerate the medication, as it heightened her anxiety.  No history of HRT  2018- History of MRI guided R core biopsy,  normal breast tissue on pathology.  No history of hyperplasia, atypia or malignancy.     Breast imaging history:  Breast MRI and mammogram in  from [facility]. No records available; she states they were normal.  2015 -Screening mammogram, BiRads 0 for asymmetry in R breast  2015 - Diagnostic mammogram, R breast - BiRads1, fibroglandular asymmetry in upper R breast  2016 - Breast MRI, BiRads1  2016 - Screening tomosynthesis mammogram, BiRads1.  2017 - Breast MRI, BiRads1  2018-Screening mammogram, BiRads1.  2018 - Breast MRI, BiRads0 for Nonmasslike area of  enhancement in the deep right breast at about 12:00, 10 cm from the nipple. The area measures 2.9 x 2.1 x 2.0 cm.   2018 - Diagnostic tomosynthesis mammogram and right US, both BiRads4 suspicious.  2018-  MRI guided R core biopsy, normal breast tissue on pathology.  2019 - Breast MRI, BiRads2.  2019- Screening tomosynthesis mammogram, BiRads1  2020- Breast MRI, BiRads3 for Probable benign background parenchymal enhancement at the 12:00 position of the left breast. Further evaluation with targeted ultrasound is recommended. If the ultrasound shows only normal tissue, routine high risk screening would be recommended.  2020- Left breast US follow up, BiRads2  2021- Screening tomosynthesis mammogram, BiRads1  11/15/2021- Breast MRI, BiRads1  2022-Screening tomosynthesis mammogram, BiRads1  2022- Breast MRI, BiRads1     Other cancer screenin2016 - Colonoscopy for family history of colon cancer in father at age 65, paternal cousin with colon cancer in 50s. And paternal uncle with 10+ colon polyps. Results: 7 hyperplastic plastic polyps removed (2 in ascending and 5 in sigmoid), and 1 tubular adenoma removed from descending colon.      2019 - Colonoscopy, 2 mm tubular adenoma in cecum, 2 mm tubular adenoma in ascending colon, 2 mm hyperplastic polyp in sigmoid colon. Mild  patchy erythema with a few small linear erosions in distal rectum, suggestive of mechanical injury. Follow up due in 2023     At this visit, she denies new fatigue, breast pain, asymmetry, lumps, masses, thickening, pain, nipple discharge and skin changes in her breasts.     Past Medical/Surgical History:  Past Medical History:   Diagnosis Date     Anemia during pregnancy and now     Asthma      Back pain      Depression with anxiety since teenage     History of tics      Mild preeclampsia     during both pregnancy, resolved     POTS (postural orthostatic tachycardia syndrome)      Past Surgical History:   Procedure Laterality Date     CHOLECYSTECTOMY, LAPOROSCOPIC  12/2017     removal of sesimoid bone right foot   1994     wisdom teeth extraction  1998       Allergies:  Allergies as of 05/30/2023 - Reviewed 05/30/2023   Allergen Reaction Noted     Dog epithelium allergy skin test  12/13/2019     Dogs  12/13/2019     Dust mites Itching 05/21/2021     Grass  12/13/2019     Hydrocodone Itching 11/13/2012     Hydrocodone, benzhydrocodone, and hydromorphone Itching 09/19/2022     Misc. sulfonamide containing compounds  05/30/2023     Mold  12/13/2019     Ondansetron Itching 05/16/2018     Sulfa antibiotics Hives 11/13/2012     Trees  12/13/2019     Adhesive tape Rash 02/03/2021     Cats Itching and Rash 05/21/2021       Current Medications:  Current Outpatient Medications   Medication Sig Dispense Refill     Cetirizine HCl (ZYRTEC ALLERGY PO) Take 10 mg by mouth 2 times daily       Esomeprazole Magnesium (NEXIUM PO)        gabapentin (NEURONTIN) 300 MG capsule Take 3 capsules by mouth       NORTRIPTYLINE HCL PO Take 40 mg by mouth       atorvastatin (LIPITOR) 10 MG tablet Take 10 mg by mouth (Patient not taking: Reported on 5/6/2022)       dicyclomine (BENTYL) 10 MG capsule TK 1-2 CS PO QID PRF ABDOMINAL PAIN (Patient not taking: Reported on 5/6/2022)  2     EPINEPHrine (EPIPEN/ADRENACLICK/OR ANY BX GENERIC EQUIV) 0.3  MG/0.3ML injection 2-pack 1 mg (Patient not taking: Reported on 2023)  0     hydrOXYzine (VISTARIL) 25 MG capsule TK 1 TO 2 CS PO Q 4 TO 6 HOURS PRN (Patient not taking: Reported on 2023)  0     hyoscyamine (LEVSIN/SL) 0.125 MG sublingual tablet TK 1 T PO SUBLINGUAL ROUTE QID (Patient not taking: Reported on 2022)  3     LINZESS 145 MCG capsule TK 1 C PO D ON AN EMPTY STOMACH AT LEAST 30 BEFORE 1ST MEAL OF THE DAY (Patient not taking: Reported on 2022)  3     LORazepam (ATIVAN) 1 MG tablet Take 1 tablet (1 mg) by mouth every 8 hours as needed for anxiety (prior to breast MRI) Take 30 minutes prior to departure.  Do not operate a vehicle after taking this medication (Patient not taking: Reported on 2022) 1 tablet 0     LORazepam (ATIVAN) 1 MG tablet Take 1 mg by mouth once (Patient not taking: Reported on 2022)  0     LORazepam (ATIVAN) 1 MG tablet Take 0.5 tablets (0.5 mg) by mouth every 8 hours as needed for anxiety (prior to breast MRI) Take 30 minutes prior to departure.  Do not operate a vehicle after taking this medication (Patient not taking: Reported on 2022) 2 tablet 0     metoclopramide (REGLAN) 5 MG tablet Take 1 tablet (5 mg) by mouth 3 times daily as needed (Patient not taking: Reported on 2022) 30 tablet 0     prochlorperazine (COMPAZINE) 25 MG suppository UNW AND I 1 SUP REC BID PRN (Patient not taking: Reported on 2023)  0     promethazine (PHENERGAN) 12.5 MG tablet TK 1/2 TO 1 T PO BID PRF NAUSEA (Patient not taking: Reported on 2022)  2     SUMAtriptan (IMITREX) 20 MG/ACT nasal spray  (Patient not taking: Reported on 2023)  2        Family History:  Family History   Problem Relation Age of Onset     Breast Cancer Mother 52         at 52     Thyroid Disease Mother      Psychotic Disorder Mother      Obesity Mother      Diabetes Father      Obesity Father      Psychotic Disorder Father      Hearing Loss Father      Colon Cancer Father 65        cause  of death     Psychotic Disorder Sister      Lung Cancer Maternal Grandmother          at 70     Cerebrovascular Disease Maternal Grandfather      Diabetes Maternal Grandfather      Breast Cancer Paternal Grandmother 40        recurrence in 70s/recurrence in 70s,  at 70     Breast Cancer Maternal Aunt 40         in 70s     Cancer Maternal Aunt 51        brain or bone cancer     Cancer Paternal Aunt         unknown,  in 70s after reaction to chemo     Colon Polyps Paternal Uncle         more than 10 colon polyps     Other - See Comments Paternal Uncle         3 deaths with CoVid     Colon Cancer Cousin 50        maternal cousin       Social History:  Social History     Socioeconomic History     Marital status:      Spouse name: Sukumar     Number of children: 2     Years of education: Not on file     Highest education level: Not on file   Occupational History     Employer: [employer]   Tobacco Use     Smoking status: Never     Smokeless tobacco: Never   Vaping Use     Vaping status: Not on file   Substance and Sexual Activity     Alcohol use: Yes     Alcohol/week: 0.0 standard drinks of alcohol     Comment: very rarely     Drug use: No     Sexual activity: Yes     Partners: Male     Birth control/protection: Condom   Other Topics Concern     Parent/sibling w/ CABG, MI or angioplasty before 65F 55M? No      Service No     Blood Transfusions No     Caffeine Concern No     Occupational Exposure No     Hobby Hazards No     Sleep Concern No     Stress Concern Yes     Weight Concern Yes     Comment: would like to lose weight     Special Diet Yes     Comment: elimination dairy     Back Care Yes     Exercise No     Bike Helmet Not Asked     Comment: NA, don't ride     Seat Belt Yes     Self-Exams No   Social History Narrative     Not on file     Social Determinants of Health     Financial Resource Strain: Not on file   Food Insecurity: Not on file   Transportation Needs: Not on file    Physical Activity: Not on file   Stress: Not on file   Social Connections: Not on file   Intimate Partner Violence: Not At Risk (5/30/2023)    Humiliation, Afraid, Rape, and Kick questionnaire      Fear of Current or Ex-Partner: No      Emotionally Abused: No      Physically Abused: No      Sexually Abused: No   Housing Stability: Not on file       Physical Exam:  /73   Pulse 102   Temp 98.2  F (36.8  C) (Oral)   Resp 16   Wt 84.5 kg (186 lb 4.8 oz)   LMP  (LMP Unknown)   SpO2 97%   BMI 28.33 kg/m      GENERAL APPEARANCE: healthy, alert and no distress     NECK: no adenopathy, no asymmetry or masses     RESP: lungs clear to auscultation - no rales, rhonchi or wheezes    BREAST: A multipositional, bilateral breast exam was performed. Symmetrical, nipples everted bilaterally. Right breast: no palpable dominant masses, no nipple discharge, no skin changes. Dense tissue.  Right axilla: no palpable adenopathy. Left breast: no palpable dominant masses, no nipple discharge, no skin changes. Left axilla: no palpable adenopathy. Dense tissue with moderate areas of  fibrocystic tissue primarily in upper quadrants.  LYMPHATICS: No cervical, supraclavicular, axillary or inguinal lymphadenopathy     CARDIOVASCULAR: regular rate and rhythm, normal S1 S2, no S3 or S4 and no murmur.       SKIN: no suspicious lesions or rashes      Laboratory/Imaging Studies  No results found for any visits on 05/30/23.    ASSESSMENT  We discussed her last screening tests and reviewed results.  Maritza is doing well, staying active and is starting to work out in her yard more.  She has no breast concerns today; there are no changes to her family's medical history.  We discussed that she should have another colonoscopy this year, due to her two colonic adenomas in 2019.    She will make an appointment for that and proceed with the following plan.  Individualized Surveillance Plan for women  With 20% or greater lifetime risk of breast  cancer   Per NCCN Breast Cancer Screening and Diagnosis Guidelines Version 1.2023   Recommended screening Test or procedure Last done Next Scheduled    Clinical encounter Clinical exam every 6-12 months.   Refer to genetic counseling if not already done.  Consider risk reduction strategies.   5/30/2023     May 2024   However, some family histories with breast cancers at a very young age, may warrant screening starting earlier.    *May begin at age 40 if breast cancers in the family occur at later ages.    Annual mammogram beginning 10 years younger than the earliest breast cancer in the family but not prior to age 30.    Recommend annual breast MRI to begin 10 years younger than the earliest breast cancer in the family but not prior to age 25.    Breast MRIs are preferably done on day 7-15 of the menstrual cycle in premenopausal women. 5/6/2022-Screening tomosynthesis mammogram, BiRads1    11/16/2022- Breast MRI, BiRads1 Mammogram after visit today    Breast MRI in November (11/17 or later)    Next exam: May 2024 followed by mammogram   Breast screening for patients at high risk due to thoracic radiation between the ages of 10-30   Annual clinical exam beginning 8 years after radiation therapy.    Annual screening mammogram beginning at age 30 or 8 years after radiation therapy    Annual breast MRI, beginning at age 25 or 8 years after radiation therapy.       NA     NA   Women who have a lifetime risk of >20% based on history of LCIS or ADH/ALH Annual screening mammogram beginning at age of LCIS or ADH/ALH but not prior to age 30.    Consider annual MRI to begin at age of diagnosis of LCIS or ADH/ALH but not prior to age 25.    Consider risk reducing strategies.     NA     NA    Recommend risk reducing strategies for women with 1.7% 5 year risk of breast cancer.       I spent a total of 35 minutes on the day of the visit. Please see the note for further information on patient assessment and treatment.    Elena  JENNIFER Bazan-CNS, OCN, AGN-BC  Clinical Nurse Specialist  Cancer Risk Management Program  [facility]    CC: Ollie Loera PA-C

## 2023-05-30 NOTE — PROGRESS NOTES
Oncology Risk Management Consultation:  Date on this visit: 2023    Maritza Hanson  requires high risk screening and surveillance for her risk of cancer secondary to having a family history of breast cancer in her mother at age 52,  maternal aunt in her 40s and paternal grandmother in her 40s. She has a 28% lifetime risk for breast cancer by the Evaristo model. She has a 2.4% risk for breast cancer within the next 5 years by the Naty model.      Primary Physician: Ollie Loera PA-C     History Of Present Illness:  Ms. Hanson is a very pleasant, healthy  47 year old female who presents with a family history of breast cancer.      Genetic Testin2015 - negative for genetic mutations in 25 genes using a My Risk panel through [employer]. Maritza was found to be negative for genetic mutations in: APC, MATTHEW, BARD1, BMPR1A, BRCA1, BRCA2, BRIP1, CDH1, CDK4, CDKN2A, CHEK2, EPCAM (large rearrangement only), MLH1, MSH2, MSH6, MUTYH, NBN, PALB2, PMS2, PTEN, RAD51C, RAD51D, SMAD4, STK11, and TP53 genes. No mutations were found in any of the 25 genes analyzed. This test involved sequencing and deletion/duplication analysis of all genes with the exception of EPCAM (deletions only).      Pertinent history:  Menarche at age 14.  First child at age 34.   Premenopausal.  Ovaries and uterus intact.Hx of tubal ligation.  Density: scattered fibroglandular densities  History of breastfeeding both of her sons, for 6 months and 10 months, respectively.   History of OCPs 10 years and used one dose of Depo-Provera; she was unable to tolerate the medication, as it heightened her anxiety.  No history of HRT  2018- History of MRI guided R core biopsy, normal breast tissue on pathology.  No history of hyperplasia, atypia or malignancy.     Breast imaging history:  Breast MRI and mammogram in  from [place]. No records available; she states they were normal.  2015 -Screening mammogram, BiRads 0 for asymmetry in  R breast  2015 - Diagnostic mammogram, R breast - BiRads1, fibroglandular asymmetry in upper R breast  2016 - Breast MRI, BiRads1  2016 - Screening tomosynthesis mammogram, BiRads1.  2017 - Breast MRI, BiRads1  2018-Screening mammogram, BiRads1.  2018 - Breast MRI, BiRads0 for Nonmasslike area of  enhancement in the deep right breast at about 12:00, 10 cm from the nipple. The area measures 2.9 x 2.1 x 2.0 cm.   2018 - Diagnostic tomosynthesis mammogram and right US, both BiRads4 suspicious.  2018-  MRI guided R core biopsy, normal breast tissue on pathology.  2019 - Breast MRI, BiRads2.  2019- Screening tomosynthesis mammogram, BiRads1  2020- Breast MRI, BiRads3 for Probable benign background parenchymal enhancement at the 12:00 position of the left breast. Further evaluation with targeted ultrasound is recommended. If the ultrasound shows only normal tissue, routine high risk screening would be recommended.  2020- Left breast US follow up, BiRads2  2021- Screening tomosynthesis mammogram, BiRads1  11/15/2021- Breast MRI, BiRads1  2022-Screening tomosynthesis mammogram, BiRads1  2022- Breast MRI, BiRads1     Other cancer screenin2016 - Colonoscopy for family history of colon cancer in father at age 65, paternal cousin with colon cancer in 50s. And paternal uncle with 10+ colon polyps. Results: 7 hyperplastic plastic polyps removed (2 in ascending and 5 in sigmoid), and 1 tubular adenoma removed from descending colon.      2019 - Colonoscopy, 2 mm tubular adenoma in cecum, 2 mm tubular adenoma in ascending colon, 2 mm hyperplastic polyp in sigmoid colon. Mild patchy erythema with a few small linear erosions in distal rectum, suggestive of mechanical injury. Follow up due in      At this visit, she denies new fatigue, breast pain, asymmetry, lumps, masses, thickening, pain, nipple discharge and skin changes in her  breasts.     Past Medical/Surgical History:  Past Medical History:   Diagnosis Date     Anemia during pregnancy and now     Asthma      Back pain      Depression with anxiety since teenage     History of tics      Mild preeclampsia     during both pregnancy, resolved     POTS (postural orthostatic tachycardia syndrome)      Past Surgical History:   Procedure Laterality Date     CHOLECYSTECTOMY, LAPOROSCOPIC  12/2017     removal of sesimoid bone right foot   1994     wisdom teeth extraction  1998       Allergies:  Allergies as of 05/30/2023 - Reviewed 05/30/2023   Allergen Reaction Noted     Dog epithelium allergy skin test  12/13/2019     Dogs  12/13/2019     Dust mites Itching 05/21/2021     Grass  12/13/2019     Hydrocodone Itching 11/13/2012     Hydrocodone, benzhydrocodone, and hydromorphone Itching 09/19/2022     Misc. sulfonamide containing compounds  05/30/2023     Mold  12/13/2019     Ondansetron Itching 05/16/2018     Sulfa antibiotics Hives 11/13/2012     Trees  12/13/2019     Adhesive tape Rash 02/03/2021     Cats Itching and Rash 05/21/2021       Current Medications:  Current Outpatient Medications   Medication Sig Dispense Refill     Cetirizine HCl (ZYRTEC ALLERGY PO) Take 10 mg by mouth 2 times daily       Esomeprazole Magnesium (NEXIUM PO)        gabapentin (NEURONTIN) 300 MG capsule Take 3 capsules by mouth       NORTRIPTYLINE HCL PO Take 40 mg by mouth       atorvastatin (LIPITOR) 10 MG tablet Take 10 mg by mouth (Patient not taking: Reported on 5/6/2022)       dicyclomine (BENTYL) 10 MG capsule TK 1-2 CS PO QID PRF ABDOMINAL PAIN (Patient not taking: Reported on 5/6/2022)  2     EPINEPHrine (EPIPEN/ADRENACLICK/OR ANY BX GENERIC EQUIV) 0.3 MG/0.3ML injection 2-pack 1 mg (Patient not taking: Reported on 5/30/2023)  0     hydrOXYzine (VISTARIL) 25 MG capsule TK 1 TO 2 CS PO Q 4 TO 6 HOURS PRN (Patient not taking: Reported on 5/30/2023)  0     hyoscyamine (LEVSIN/SL) 0.125 MG sublingual tablet TK 1 T  PO SUBLINGUAL ROUTE QID (Patient not taking: Reported on 2022)  3     LINZESS 145 MCG capsule TK 1 C PO D ON AN EMPTY STOMACH AT LEAST 30 BEFORE 1ST MEAL OF THE DAY (Patient not taking: Reported on 2022)  3     LORazepam (ATIVAN) 1 MG tablet Take 1 tablet (1 mg) by mouth every 8 hours as needed for anxiety (prior to breast MRI) Take 30 minutes prior to departure.  Do not operate a vehicle after taking this medication (Patient not taking: Reported on 2022) 1 tablet 0     LORazepam (ATIVAN) 1 MG tablet Take 1 mg by mouth once (Patient not taking: Reported on 2022)  0     LORazepam (ATIVAN) 1 MG tablet Take 0.5 tablets (0.5 mg) by mouth every 8 hours as needed for anxiety (prior to breast MRI) Take 30 minutes prior to departure.  Do not operate a vehicle after taking this medication (Patient not taking: Reported on 2022) 2 tablet 0     metoclopramide (REGLAN) 5 MG tablet Take 1 tablet (5 mg) by mouth 3 times daily as needed (Patient not taking: Reported on 2022) 30 tablet 0     prochlorperazine (COMPAZINE) 25 MG suppository UNW AND I 1 SUP REC BID PRN (Patient not taking: Reported on 2023)  0     promethazine (PHENERGAN) 12.5 MG tablet TK 1/2 TO 1 T PO BID PRF NAUSEA (Patient not taking: Reported on 2022)  2     SUMAtriptan (IMITREX) 20 MG/ACT nasal spray  (Patient not taking: Reported on 2023)  2        Family History:  Family History   Problem Relation Age of Onset     Breast Cancer Mother 52         at 52     Thyroid Disease Mother      Psychotic Disorder Mother      Obesity Mother      Diabetes Father      Obesity Father      Psychotic Disorder Father      Hearing Loss Father      Colon Cancer Father 65        cause of death     Psychotic Disorder Sister      Lung Cancer Maternal Grandmother          at 70     Cerebrovascular Disease Maternal Grandfather      Diabetes Maternal Grandfather      Breast Cancer Paternal Grandmother 40        recurrence in 70s/recurrence in  70s,  at 70     Breast Cancer Maternal Aunt 40         in 70s     Cancer Maternal Aunt 51        brain or bone cancer     Cancer Paternal Aunt         unknown,  in 70s after reaction to chemo     Colon Polyps Paternal Uncle         more than 10 colon polyps     Other - See Comments Paternal Uncle         3 deaths with CoVid     Colon Cancer Cousin 50        maternal cousin       Social History:  Social History     Socioeconomic History     Marital status:      Spouse name: Sukumar     Number of children: 2     Years of education: Not on file     Highest education level: Not on file   Occupational History     Employer: [employer]   Tobacco Use     Smoking status: Never     Smokeless tobacco: Never   Vaping Use     Vaping status: Not on file   Substance and Sexual Activity     Alcohol use: Yes     Alcohol/week: 0.0 standard drinks of alcohol     Comment: very rarely     Drug use: No     Sexual activity: Yes     Partners: Male     Birth control/protection: Condom   Other Topics Concern     Parent/sibling w/ CABG, MI or angioplasty before 65F 55M? No      Service No     Blood Transfusions No     Caffeine Concern No     Occupational Exposure No     Hobby Hazards No     Sleep Concern No     Stress Concern Yes     Weight Concern Yes     Comment: would like to lose weight     Special Diet Yes     Comment: elimination dairy     Back Care Yes     Exercise No     Bike Helmet Not Asked     Comment: NA, don't ride     Seat Belt Yes     Self-Exams No   Social History Narrative     Not on file     Social Determinants of Health     Financial Resource Strain: Not on file   Food Insecurity: Not on file   Transportation Needs: Not on file   Physical Activity: Not on file   Stress: Not on file   Social Connections: Not on file   Intimate Partner Violence: Not At Risk (2023)    Humiliation, Afraid, Rape, and Kick questionnaire      Fear of Current or Ex-Partner: No      Emotionally Abused: No       Physically Abused: No      Sexually Abused: No   Housing Stability: Not on file       Physical Exam:  /73   Pulse 102   Temp 98.2  F (36.8  C) (Oral)   Resp 16   Wt 84.5 kg (186 lb 4.8 oz)   LMP  (LMP Unknown)   SpO2 97%   BMI 28.33 kg/m      GENERAL APPEARANCE: healthy, alert and no distress     NECK: no adenopathy, no asymmetry or masses     RESP: lungs clear to auscultation - no rales, rhonchi or wheezes    BREAST: A multipositional, bilateral breast exam was performed. Symmetrical, nipples everted bilaterally. Right breast: no palpable dominant masses, no nipple discharge, no skin changes. Dense tissue.  Right axilla: no palpable adenopathy. Left breast: no palpable dominant masses, no nipple discharge, no skin changes. Left axilla: no palpable adenopathy. Dense tissue with moderate areas of  fibrocystic tissue primarily in upper quadrants.  LYMPHATICS: No cervical, supraclavicular, axillary or inguinal lymphadenopathy     CARDIOVASCULAR: regular rate and rhythm, normal S1 S2, no S3 or S4 and no murmur.       SKIN: no suspicious lesions or rashes      Laboratory/Imaging Studies  No results found for any visits on 05/30/23.    ASSESSMENT  We discussed her last screening tests and reviewed results.  Maritza is doing well, staying active and is starting to work out in her yard more.  She has no breast concerns today; there are no changes to her family's medical history.  We discussed that she should have another colonoscopy this year, due to her two colonic adenomas in 2019.    She will make an appointment for that and proceed with the following plan.  Individualized Surveillance Plan for women  With 20% or greater lifetime risk of breast cancer   Per NCCN Breast Cancer Screening and Diagnosis Guidelines Version 1.2023   Recommended screening Test or procedure Last done Next Scheduled    Clinical encounter Clinical exam every 6-12 months.   Refer to genetic counseling if not already done.  Consider risk  reduction strategies.   5/30/2023     May 2024   However, some family histories with breast cancers at a very young age, may warrant screening starting earlier.    *May begin at age 40 if breast cancers in the family occur at later ages.    Annual mammogram beginning 10 years younger than the earliest breast cancer in the family but not prior to age 30.    Recommend annual breast MRI to begin 10 years younger than the earliest breast cancer in the family but not prior to age 25.    Breast MRIs are preferably done on day 7-15 of the menstrual cycle in premenopausal women. 5/6/2022-Screening tomosynthesis mammogram, BiRads1    11/16/2022- Breast MRI, BiRads1 Mammogram after visit today    Breast MRI in November (11/17 or later)    Next exam: May 2024 followed by mammogram   Breast screening for patients at high risk due to thoracic radiation between the ages of 10-30   Annual clinical exam beginning 8 years after radiation therapy.    Annual screening mammogram beginning at age 30 or 8 years after radiation therapy    Annual breast MRI, beginning at age 25 or 8 years after radiation therapy.       NA     NA   Women who have a lifetime risk of >20% based on history of LCIS or ADH/ALH Annual screening mammogram beginning at age of LCIS or ADH/ALH but not prior to age 30.    Consider annual MRI to begin at age of diagnosis of LCIS or ADH/ALH but not prior to age 25.    Consider risk reducing strategies.     NA     NA    Recommend risk reducing strategies for women with 1.7% 5 year risk of breast cancer.       I spent a total of 35 minutes on the day of the visit. Please see the note for further information on patient assessment and treatment.    JENNIFER Guerrero-CNS, OCN, AGN-BC  Clinical Nurse Specialist  Cancer Risk Management Program  [facility]    CC: Ollie Loera PA-C

## 2023-05-30 NOTE — PATIENT INSTRUCTIONS
Individualized Surveillance Plan for women  With 20% or greater lifetime risk of breast cancer   Per NCCN Breast Cancer Screening and Diagnosis Guidelines Version 1.2023   Recommended screening Test or procedure Last done Next Scheduled    Clinical encounter Clinical exam every 6-12 months.   Refer to genetic counseling if not already done.  Consider risk reduction strategies.   5/30/2023     May 2024   However, some family histories with breast cancers at a very young age, may warrant screening starting earlier.    *May begin at age 40 if breast cancers in the family occur at later ages.    Annual mammogram beginning 10 years younger than the earliest breast cancer in the family but not prior to age 30.    Recommend annual breast MRI to begin 10 years younger than the earliest breast cancer in the family but not prior to age 25.    Breast MRIs are preferably done on day 7-15 of the menstrual cycle in premenopausal women. 5/6/2022-Screening tomosynthesis mammogram, BiRads1    11/16/2022- Breast MRI, BiRads1 Mammogram after visit today    Breast MRI in November (11/17 or later)    Next exam: May 2024 followed by mammogram   Breast screening for patients at high risk due to thoracic radiation between the ages of 10-30   Annual clinical exam beginning 8 years after radiation therapy.    Annual screening mammogram beginning at age 30 or 8 years after radiation therapy    Annual breast MRI, beginning at age 25 or 8 years after radiation therapy.       NA     NA   Women who have a lifetime risk of >20% based on history of LCIS or ADH/ALH Annual screening mammogram beginning at age of LCIS or ADH/ALH but not prior to age 30.    Consider annual MRI to begin at age of diagnosis of LCIS or ADH/ALH but not prior to age 25.    Consider risk reducing strategies.     NA     NA    Recommend risk reducing strategies for women with 1.7% 5 year risk of breast cancer.

## 2023-05-30 NOTE — NURSING NOTE
"Oncology Rooming Note    May 30, 2023 10:10 AM   Maritza Hanson is a 47 year old adult who presents for:    Chief Complaint   Patient presents with     Oncology Clinic Visit     Breast cancer screening, high risk patient (Primary Dx); Dense breast; Family history of malignant neoplasm of breast      Initial Vitals: /73   Pulse 102   Temp 98.2  F (36.8  C) (Oral)   Resp 16   Wt 84.5 kg (186 lb 4.8 oz)   LMP  (LMP Unknown)   SpO2 97%   BMI 28.33 kg/m   Estimated body mass index is 28.33 kg/m  as calculated from the following:    Height as of 7/6/18: 1.727 m (5' 8\").    Weight as of this encounter: 84.5 kg (186 lb 4.8 oz). Body surface area is 2.01 meters squared.  No Pain (0) Comment: Data Unavailable   No LMP recorded (lmp unknown).  Allergies reviewed: Yes  Medications reviewed: Yes    Medications: Medication refills not needed today.  Pharmacy name entered into [employer]: [id number] - [place], [place] - [address]    Clinical concerns: NONE      Penny Simon            "

## 2023-06-03 ENCOUNTER — HEALTH MAINTENANCE LETTER (OUTPATIENT)
Age: 48
End: 2023-06-03

## 2023-08-04 DIAGNOSIS — Z86.0101 HISTORY OF ADENOMATOUS POLYP OF COLON: Primary | ICD-10-CM

## 2023-08-04 DIAGNOSIS — Z80.0 FAMILY HISTORY OF COLON CANCER: ICD-10-CM

## 2023-09-07 ENCOUNTER — MYC MEDICAL ADVICE (OUTPATIENT)
Dept: ONCOLOGY | Facility: CLINIC | Age: 48
End: 2023-09-07
Payer: COMMERCIAL

## 2023-09-07 DIAGNOSIS — F41.9 ANXIETY: Primary | ICD-10-CM

## 2023-09-07 RX ORDER — LORAZEPAM 1 MG/1
1 TABLET ORAL ONCE
Qty: 1 TABLET | Refills: 0 | Status: SHIPPED | OUTPATIENT
Start: 2023-09-07 | End: 2023-09-07

## 2023-11-17 ENCOUNTER — HOSPITAL ENCOUNTER (OUTPATIENT)
Dept: MRI IMAGING | Facility: CLINIC | Age: 48
Discharge: HOME | End: 2023-11-17
Attending: CLINICAL NURSE SPECIALIST | Admitting: CLINICAL NURSE SPECIALIST
Payer: COMMERCIAL

## 2023-11-17 DIAGNOSIS — R92.30 DENSE BREAST: ICD-10-CM

## 2023-11-17 DIAGNOSIS — Z12.39 BREAST CANCER SCREENING, HIGH RISK PATIENT: ICD-10-CM

## 2023-11-17 DIAGNOSIS — Z80.3 FAMILY HISTORY OF MALIGNANT NEOPLASM OF BREAST: ICD-10-CM

## 2023-11-17 PROCEDURE — 77049 MRI BREAST C-+ W/CAD BI: CPT

## 2023-11-17 PROCEDURE — 255N000002 HC RX 255 OP 636: Mod: JZ | Performed by: STUDENT IN AN ORGANIZED HEALTH CARE EDUCATION/TRAINING PROGRAM

## 2023-11-17 PROCEDURE — A9585 GADOBUTROL INJECTION: HCPCS | Mod: JZ | Performed by: STUDENT IN AN ORGANIZED HEALTH CARE EDUCATION/TRAINING PROGRAM

## 2023-11-17 RX ORDER — GADOBUTROL 604.72 MG/ML
8.5 INJECTION INTRAVENOUS ONCE
Status: COMPLETED | OUTPATIENT
Start: 2023-11-17 | End: 2023-11-17

## 2023-11-17 RX ADMIN — GADOBUTROL 8.5 ML: 604.72 INJECTION INTRAVENOUS at 09:52

## 2024-01-09 NOTE — ED NOTES
Alert and oriented x 3 airway,breathing and circulation intact, abd pain and vomiting since Friday, unable to keep fluids done   BALDEMAR

## 2024-05-24 NOTE — PROGRESS NOTES
9/4/2018      Re:   Jesús Stratton  [address] [place] [address]                        This is to certify that Jesús Stratton was seen at [facility] today.    RESTRICTIONS: Off of work on 9-3-18, and off today 9-4-18. Can return on 9-5-18.          SIGNATURE:___________________________________________,   9/4/2018      Kimberly Hughes MD          [facility]  [address] [facility] [id number]     Oncology Risk Management Consultation:  Date on this visit: 2024    Maritza Hanson requires high risk screening and surveillance for her risk of cancer secondary to having a family history of breast cancer in her mother at age 52,  maternal aunt in her 40s and paternal grandmother in her 40s. She has a 28.4% lifetime risk for breast cancer by the CALIN model. She has a 2.4% risk for breast cancer within the next 5 years by the Naty model.      Primary Physician: Ollie Loera PA-C     History Of Present Illness:  Ms. Hanson is a very pleasant, healthy 48 year old female who presents with a family history of breast cancer.      Genetic Testin2015 - negative for genetic mutations in 25 genes using a My Risk panel through [employer]. Maritza was found to be negative for genetic mutations in: APC, MATTHEW, BARD1, BMPR1A, BRCA1, BRCA2, BRIP1, CDH1, CDK4, CDKN2A, CHEK2, EPCAM (large rearrangement only), MLH1, MSH2, MSH6, MUTYH, NBN, PALB2, PMS2, PTEN, RAD51C, RAD51D, SMAD4, STK11, and TP53 genes. No mutations were found in any of the 25 genes analyzed. This test involved sequencing and deletion/duplication analysis of all genes with the exception of EPCAM (deletions only).      Pertinent history:  Menarche at age 14.  First child at age 34.   Premenopausal.  Ovaries and uterus intact. Hx of tubal ligation.  Density: scattered fibroglandular densities  History of breastfeeding both of her sons, for 6 months and 10 months, respectively.   History of OCPs 10 years and used one dose of Depo-Provera; she was unable to tolerate the medication, as it heightened her anxiety.  No history of HRT  2018- History of MRI guided R core biopsy, normal breast tissue on pathology.  No history of hyperplasia, atypia or malignancy.     Breast imaging history:  Breast MRI and mammogram in  from [place]. No records available; she states they were normal.  2015 -Screening mammogram, BiRads 0 for asymmetry in  R breast  2015 - Diagnostic mammogram, R breast - BiRads1, fibroglandular asymmetry in upper R breast  2016 - Breast MRI, BiRads1  2016 - Screening tomosynthesis mammogram, BiRads1.  2017 - Breast MRI, BiRads1  2018-Screening mammogram, BiRads1.  2018 - Breast MRI, BiRads0 for Nonmasslike area of  enhancement in the deep right breast at about 12:00, 10 cm from the nipple. The area measures 2.9 x 2.1 x 2.0 cm.   2018 - Diagnostic tomosynthesis mammogram and right US, both BiRads4 suspicious.  2018-  MRI guided R core biopsy, normal breast tissue on pathology.  2019 - Breast MRI, BiRads2.  2019- Screening tomosynthesis mammogram, BiRads1  2020- Breast MRI, BiRads3 for Probable benign background parenchymal enhancement at the 12:00 position of the left breast. Further evaluation with targeted ultrasound is recommended. If the ultrasound shows only normal tissue, routine high risk screening would be recommended.  2020- Left breast US follow up, BiRads2  2021- Screening tomosynthesis mammogram, BiRads1  11/15/2021- Breast MRI, BiRads1  2022-Screening tomosynthesis mammogram, BiRads1  2022- Breast MRI, BiRads1  2023: Screening tomosynthesis mammogram, BiRads1  2023: Breast MRI, BiRads1     Other cancer screenin2016 - Colonoscopy for family history of colon cancer in father at age 65, paternal cousin with colon cancer in 50s. And paternal uncle with 10+ colon polyps. Results: 7 hyperplastic plastic polyps removed (2 in ascending and 5 in sigmoid), and 1 tubular adenoma removed from descending colon.      2019 - Colonoscopy, 2 mm tubular adenoma in cecum, 2 mm tubular adenoma in ascending colon, 2 mm hyperplastic polyp in sigmoid colon. Mild patchy erythema with a few small linear erosions in distal rectum, suggestive of mechanical injury. Follow up due in .     At this visit, she denies new fatigue, breast pain,  asymmetry, lumps, masses, thickening, pain, nipple discharge and skin changes in her breasts.    Past Medical/Surgical History:  Past Medical History:   Diagnosis Date    Anemia during pregnancy and now    Asthma     Back pain     Depression with anxiety since teenage    History of tics     Mild preeclampsia     during both pregnancy, resolved    POTS (postural orthostatic tachycardia syndrome)      Past Surgical History:   Procedure Laterality Date    CHOLECYSTECTOMY, LAPOROSCOPIC  12/2017    removal of sesimoid bone right foot   1994    wisdom teeth extraction  1998       Allergies:  Allergies as of 05/30/2024 - Reviewed 05/30/2024   Allergen Reaction Noted    Dog epithelium (canis lupus familiaris)  12/13/2019    Dogs  12/13/2019    Dust mites Itching 05/21/2021    Grass  12/13/2019    Hydrocodone Itching 11/13/2012    Hydrocodone, benzhydrocodone, and hydromorphone Itching 09/19/2022    Misc. sulfonamide containing compounds  05/30/2023    Mold  12/13/2019    Ondansetron Itching 05/16/2018    Sulfa antibiotics Hives 11/13/2012    Trees  12/13/2019    Adhesive tape Rash 02/03/2021    Cats Itching and Rash 05/21/2021       Current Medications:  Current Outpatient Medications   Medication Sig Dispense Refill    atorvastatin (LIPITOR) 10 MG tablet Take 10 mg by mouth      Cetirizine HCl (ZYRTEC ALLERGY PO) Take 10 mg by mouth 2 times daily      EPINEPHrine (EPIPEN/ADRENACLICK/OR ANY BX GENERIC EQUIV) 0.3 MG/0.3ML injection 2-pack 1 mg  0    gabapentin (NEURONTIN) 300 MG capsule Take 600 mg by mouth      hydrOXYzine (VISTARIL) 25 MG capsule   0    levonorgestrel (MIRENA) 52 MG (20 mcg/day) IUD 1 Device by Intrauterine route      nortriptyline (PAMELOR) 50 MG capsule       NORTRIPTYLINE HCL PO Take 40 mg by mouth      prochlorperazine (COMPAZINE) 25 MG suppository   0    promethazine (PHENERGAN) 12.5 MG tablet   2    rosuvastatin (CRESTOR) 10 MG tablet Take 10 mg by mouth daily      SUMAtriptan (IMITREX) 20 MG/ACT  nasal spray   2    dicyclomine (BENTYL) 10 MG capsule TK 1-2 CS PO QID PRF ABDOMINAL PAIN (Patient not taking: Reported on 2022)  2    Esomeprazole Magnesium (NEXIUM PO)  (Patient not taking: Reported on 2024)      hyoscyamine (LEVSIN/SL) 0.125 MG sublingual tablet TK 1 T PO SUBLINGUAL ROUTE QID (Patient not taking: Reported on 2022)  3    LINZESS 145 MCG capsule TK 1 C PO D ON AN EMPTY STOMACH AT LEAST 30 BEFORE 1ST MEAL OF THE DAY (Patient not taking: Reported on 2022)  3    LORazepam (ATIVAN) 1 MG tablet Take 1 tablet (1 mg) by mouth every 8 hours as needed for anxiety (prior to breast MRI) Take 30 minutes prior to departure.  Do not operate a vehicle after taking this medication (Patient not taking: Reported on 2022) 1 tablet 0    LORazepam (ATIVAN) 1 MG tablet Take 0.5 tablets (0.5 mg) by mouth every 8 hours as needed for anxiety (prior to breast MRI) Take 30 minutes prior to departure.  Do not operate a vehicle after taking this medication (Patient not taking: Reported on 2022) 2 tablet 0    metoclopramide (REGLAN) 5 MG tablet Take 1 tablet (5 mg) by mouth 3 times daily as needed (Patient not taking: Reported on 2022) 30 tablet 0        Family History:  Family History   Problem Relation Age of Onset    Breast Cancer Mother 52         at 52    Thyroid Disease Mother     Psychotic Disorder Mother     Obesity Mother     Diabetes Father     Obesity Father     Psychotic Disorder Father     Hearing Loss Father     Colon Cancer Father 65        cause of death    Psychotic Disorder Sister     Lung Cancer Maternal Grandmother          at 70    Cerebrovascular Disease Maternal Grandfather     Diabetes Maternal Grandfather     Breast Cancer Paternal Grandmother 40        recurrence in 70s/recurrence in 70s,  at 70    Breast Cancer Maternal Aunt 40         in 70s    Cancer Maternal Aunt 51        brain or bone cancer    Cancer Paternal Aunt         unknown,  in 70s after  reaction to chemo    Colon Polyps Paternal Uncle         more than 10 colon polyps    Other - See Comments Paternal Uncle         3 deaths with CoVid    Colon Cancer Cousin 50        maternal cousin       Social History:  Social History     Socioeconomic History    Marital status:      Spouse name: Sukumar    Number of children: 2    Years of education: Not on file    Highest education level: Not on file   Occupational History     Employer: [employer]   Tobacco Use    Smoking status: Never     Passive exposure: Past    Smokeless tobacco: Never   Substance and Sexual Activity    Alcohol use: Yes     Alcohol/week: 0.0 standard drinks of alcohol     Comment: very rarely    Drug use: No    Sexual activity: Yes     Partners: Male     Birth control/protection: Condom   Other Topics Concern    Parent/sibling w/ CABG, MI or angioplasty before 65F 55M? No     Service No    Blood Transfusions No    Caffeine Concern No    Occupational Exposure No    Hobby Hazards No    Sleep Concern No    Stress Concern Yes    Weight Concern Yes     Comment: would like to lose weight    Special Diet Yes     Comment: elimination dairy    Back Care Yes    Exercise No    Bike Helmet Not Asked     Comment: NA, don't ride    Seat Belt Yes    Self-Exams No   Social History Narrative    Not on file     Social Determinants of Health     Financial Resource Strain: Low Risk  (12/9/2022)    [facility]    Financial Resource Strain     Difficulty of Paying Living Expenses: 3     Difficulty of Paying Living Expenses: Not on file   Food Insecurity: No Food Insecurity (12/9/2022)    [facility]    Food Insecurity     Worried About Running Out of Food in the Last Year: 1   Transportation Needs: No Transportation Needs (12/9/2022)    Received from [employer]  "[facility], [facility]    Transportation Needs     Lack of Transportation (Medical): 1   Physical Activity: Not on file   Stress: Not on file   Social Connections: Unknown (12/11/2023)    Received from [facility]    Social Connections     Frequency of Communication with Friends and Family: Not on file   Interpersonal Safety: Not At Risk (5/30/2023)    Humiliation, Afraid, Rape, and Kick questionnaire     Fear of Current or Ex-Partner: No     Emotionally Abused: No     Physically Abused: No     Sexually Abused: No   Housing Stability: Low Risk  (12/9/2022)    [facility] from [facility], [facility]    Housing Stability     Unable to Pay for Housing in the Last Year: 1       Physical Exam:  /83 (BP Location: Right arm, Patient Position: Sitting, Cuff Size: Adult Regular)   Pulse 101   Temp 98.7  F (37.1  C) (Oral)   Resp 14   Ht 1.76 m (5' 9.29\")   Wt 87.9 kg (193 lb 11.2 oz)   SpO2 97%   BMI 28.36 kg/m    GENERAL APPEARANCE: healthy, alert and no apparent distress  BREAST: A multipositional, bilateral breast exam was performed.  Fairly symmetrical -Rsl>L. Right breast: no palpable dominant masses, no nipple discharge, no skin changes. Dense tissue.  Right axilla: no palpable adenopathy. Left breast: no palpable dominant masses, no nipple discharge, no skin changes. Left axilla: no palpable adenopathy. Dense tissue.    LYMPHATICS: No cervical, supraclavicular, or axillary lymphadenopathy  SKIN: no suspicious lesions or rashes on examined skin    Laboratory/Imaging Studies  No results found for any visits on 05/30/24.    ASSESSMENT    Maritza reports that she is doing well at this visit. She has no concerns with her breast tissue at this time, and no updates to her family history. We discussed her last screening tests and reviewed results, all of " which have been negative.  We discussed concerns and symptoms to be watchful for between visits, including breast lumps, bumps, nipple inversion, nipple discharge and any changes in skin including crinkled or puckered skin. We reviewed the recommendation for self breast awareness. We will proceed with the plan below.       Individualized Surveillance Plan for women  With 20% or greater lifetime risk of breast cancer   Per NCCN Breast Cancer Screening and Diagnosis Guidelines Version 1.2023   Recommended screening Test or procedure Last done Next Scheduled    Clinical encounter Clinical exam every 6-12 months.   Refer to genetic counseling if not already done.  Consider risk reduction strategies.   May 2024   May 2025   However, some family histories with breast cancers at a very young age, may warrant screening starting earlier.    *May begin at age 40 if breast cancers in the family occur at later ages.    Annual mammogram beginning 10 years younger than the earliest breast cancer in the family but not prior to age 30.    Recommend annual breast MRI to begin 10 years younger than the earliest breast cancer in the family but not prior to age 25.    Breast MRIs are preferably done on day 7-15 of the menstrual cycle in premenopausal women.   5/30/2023: Screening tomosynthesis mammogram, BiRads1    11/17/2023: Breast MRI, BiRads1   Mammogram today    Breast MRI in November (11/18 or later)    Return to clinic in May 2025 with a mammogram following our visit   Breast screening for patients at high risk due to thoracic radiation between the ages of 10-30   Annual clinical exam beginning 8 years after radiation therapy.    Annual screening mammogram beginning at age 30 or 8 years after radiation therapy    Annual breast MRI, beginning at age 25 or 8 years after radiation therapy.     NA   NA   Women who have a lifetime risk of >20% based on history of LCIS or ADH/ALH Annual screening mammogram beginning at age of LCIS or  ADH/ALH but not prior to age 30.    Consider annual MRI to begin at age of diagnosis of LCIS or ADH/ALH but not prior to age 25.    Consider risk reducing strategies.   NA   NA    Recommend risk reducing strategies for women with 1.7% 5 year risk of breast cancer.           I spent a total of 30 minutes on the day of the visit. Please see the note for further information on patient assessment and treatment.     Radha Flores, DADA, APRN, AGCNS-BC  Clinical Nurse Specialist  Cancer Risk Management Program  [facility]    Cc:  Ollie Loera PA-C

## 2024-05-30 ENCOUNTER — ONCOLOGY VISIT (OUTPATIENT)
Dept: ONCOLOGY | Facility: CLINIC | Age: 49
End: 2024-05-30
Payer: COMMERCIAL

## 2024-05-30 ENCOUNTER — ANCILLARY PROCEDURE (OUTPATIENT)
Dept: MAMMOGRAPHY | Facility: CLINIC | Age: 49
End: 2024-05-30
Attending: CLINICAL NURSE SPECIALIST
Payer: COMMERCIAL

## 2024-05-30 VITALS
WEIGHT: 193.7 LBS | TEMPERATURE: 98.7 F | RESPIRATION RATE: 14 BRPM | BODY MASS INDEX: 28.69 KG/M2 | HEIGHT: 69 IN | OXYGEN SATURATION: 97 % | SYSTOLIC BLOOD PRESSURE: 133 MMHG | HEART RATE: 101 BPM | DIASTOLIC BLOOD PRESSURE: 83 MMHG

## 2024-05-30 DIAGNOSIS — Z12.39 BREAST CANCER SCREENING, HIGH RISK PATIENT: ICD-10-CM

## 2024-05-30 DIAGNOSIS — R92.30 DENSE BREAST: ICD-10-CM

## 2024-05-30 DIAGNOSIS — Z80.3 FAMILY HISTORY OF MALIGNANT NEOPLASM OF BREAST: ICD-10-CM

## 2024-05-30 DIAGNOSIS — Z12.39 BREAST CANCER SCREENING, HIGH RISK PATIENT: Primary | ICD-10-CM

## 2024-05-30 PROCEDURE — 77067 SCR MAMMO BI INCL CAD: CPT | Mod: GC

## 2024-05-30 PROCEDURE — 99203 OFFICE O/P NEW LOW 30 MIN: CPT

## 2024-05-30 PROCEDURE — 99213 OFFICE O/P EST LOW 20 MIN: CPT

## 2024-05-30 PROCEDURE — 77063 BREAST TOMOSYNTHESIS BI: CPT | Mod: GC

## 2024-05-30 RX ORDER — ROSUVASTATIN CALCIUM 10 MG/1
10 TABLET, COATED ORAL DAILY
COMMUNITY
Start: 2023-09-11

## 2024-05-30 RX ORDER — NORTRIPTYLINE HYDROCHLORIDE 50 MG/1
CAPSULE ORAL
COMMUNITY
Start: 2024-05-23

## 2024-05-30 ASSESSMENT — PAIN SCALES - GENERAL: PAINLEVEL: NO PAIN (0)

## 2024-05-30 NOTE — LETTER
2024         RE: Maritza Hanson  [id number] [facility] [id number]        Dear Colleague,    Thank you for referring your patient, Maritza Hanson, to the [facility]. Please see a copy of my visit note below.    Oncology Risk Management Consultation:  Date on this visit: 2024    Maritza Hanson requires high risk screening and surveillance for her risk of cancer secondary to having a family history of breast cancer in her mother at age 52,  maternal aunt in her 40s and paternal grandmother in her 40s. She has a 28.4% lifetime risk for breast cancer by the CALIN model. She has a 2.4% risk for breast cancer within the next 5 years by the Naty model.      Primary Physician: Ollie Loera PA-C     History Of Present Illness:  Ms. Hanson is a very pleasant, healthy 48 year old female who presents with a family history of breast cancer.      Genetic Testin2015 - negative for genetic mutations in 25 genes using a My Risk panel through [employer]. Maritza was found to be negative for genetic mutations in: APC, MATTHEW, BARD1, BMPR1A, BRCA1, BRCA2, BRIP1, CDH1, CDK4, CDKN2A, CHEK2, EPCAM (large rearrangement only), MLH1, MSH2, MSH6, MUTYH, NBN, PALB2, PMS2, PTEN, RAD51C, RAD51D, SMAD4, STK11, and TP53 genes. No mutations were found in any of the 25 genes analyzed. This test involved sequencing and deletion/duplication analysis of all genes with the exception of EPCAM (deletions only).      Pertinent history:  Menarche at age 14.  First child at age 34.   Premenopausal.  Ovaries and uterus intact. Hx of tubal ligation.  Density: scattered fibroglandular densities  History of breastfeeding both of her sons, for 6 months and 10 months, respectively.   History of OCPs 10 years and used one dose of Depo-Provera; she was unable to tolerate the medication, as it heightened her anxiety.  No history of HRT  2018- History of MRI guided R core biopsy, normal  breast tissue on pathology.  No history of hyperplasia, atypia or malignancy.     Breast imaging history:  Breast MRI and mammogram in  from [place]. No records available; she states they were normal.  2015 -Screening mammogram, BiRads 0 for asymmetry in R breast  2015 - Diagnostic mammogram, R breast - BiRads1, fibroglandular asymmetry in upper R breast  2016 - Breast MRI, BiRads1  2016 - Screening tomosynthesis mammogram, BiRads1.  2017 - Breast MRI, BiRads1  2018-Screening mammogram, BiRads1.  2018 - Breast MRI, BiRads0 for Nonmasslike area of  enhancement in the deep right breast at about 12:00, 10 cm from the nipple. The area measures 2.9 x 2.1 x 2.0 cm.   2018 - Diagnostic tomosynthesis mammogram and right US, both BiRads4 suspicious.  2018-  MRI guided R core biopsy, normal breast tissue on pathology.  2019 - Breast MRI, BiRads2.  2019- Screening tomosynthesis mammogram, BiRads1  2020- Breast MRI, BiRads3 for Probable benign background parenchymal enhancement at the 12:00 position of the left breast. Further evaluation with targeted ultrasound is recommended. If the ultrasound shows only normal tissue, routine high risk screening would be recommended.  2020- Left breast US follow up, BiRads2  2021- Screening tomosynthesis mammogram, BiRads1  11/15/2021- Breast MRI, BiRads1  2022-Screening tomosynthesis mammogram, BiRads1  2022- Breast MRI, BiRads1  2023: Screening tomosynthesis mammogram, BiRads1  2023: Breast MRI, BiRads1     Other cancer screenin2016 - Colonoscopy for family history of colon cancer in father at age 65, paternal cousin with colon cancer in 50s. And paternal uncle with 10+ colon polyps. Results: 7 hyperplastic plastic polyps removed (2 in ascending and 5 in sigmoid), and 1 tubular adenoma removed from descending colon.      2019 - Colonoscopy, 2 mm tubular adenoma in cecum, 2 mm  tubular adenoma in ascending colon, 2 mm hyperplastic polyp in sigmoid colon. Mild patchy erythema with a few small linear erosions in distal rectum, suggestive of mechanical injury. Follow up due in 2024.     At this visit, she denies new fatigue, breast pain, asymmetry, lumps, masses, thickening, pain, nipple discharge and skin changes in her breasts.    Past Medical/Surgical History:  Past Medical History:   Diagnosis Date    Anemia during pregnancy and now    Asthma     Back pain     Depression with anxiety since teenage    History of tics     Mild preeclampsia     during both pregnancy, resolved    POTS (postural orthostatic tachycardia syndrome)      Past Surgical History:   Procedure Laterality Date    CHOLECYSTECTOMY, LAPOROSCOPIC  12/2017    removal of sesimoid bone right foot   1994    wisdom teeth extraction  1998       Allergies:  Allergies as of 05/30/2024 - Reviewed 05/30/2024   Allergen Reaction Noted    Dog epithelium (canis lupus familiaris)  12/13/2019    Dogs  12/13/2019    Dust mites Itching 05/21/2021    Grass  12/13/2019    Hydrocodone Itching 11/13/2012    Hydrocodone, benzhydrocodone, and hydromorphone Itching 09/19/2022    Misc. sulfonamide containing compounds  05/30/2023    Mold  12/13/2019    Ondansetron Itching 05/16/2018    Sulfa antibiotics Hives 11/13/2012    Trees  12/13/2019    Adhesive tape Rash 02/03/2021    Cats Itching and Rash 05/21/2021       Current Medications:  Current Outpatient Medications   Medication Sig Dispense Refill    atorvastatin (LIPITOR) 10 MG tablet Take 10 mg by mouth      Cetirizine HCl (ZYRTEC ALLERGY PO) Take 10 mg by mouth 2 times daily      EPINEPHrine (EPIPEN/ADRENACLICK/OR ANY BX GENERIC EQUIV) 0.3 MG/0.3ML injection 2-pack 1 mg  0    gabapentin (NEURONTIN) 300 MG capsule Take 600 mg by mouth      hydrOXYzine (VISTARIL) 25 MG capsule   0    levonorgestrel (MIRENA) 52 MG (20 mcg/day) IUD 1 Device by Intrauterine route      nortriptyline (PAMELOR) 50 MG  capsule       NORTRIPTYLINE HCL PO Take 40 mg by mouth      prochlorperazine (COMPAZINE) 25 MG suppository   0    promethazine (PHENERGAN) 12.5 MG tablet   2    rosuvastatin (CRESTOR) 10 MG tablet Take 10 mg by mouth daily      SUMAtriptan (IMITREX) 20 MG/ACT nasal spray   2    dicyclomine (BENTYL) 10 MG capsule TK 1-2 CS PO QID PRF ABDOMINAL PAIN (Patient not taking: Reported on 2022)  2    Esomeprazole Magnesium (NEXIUM PO)  (Patient not taking: Reported on 2024)      hyoscyamine (LEVSIN/SL) 0.125 MG sublingual tablet TK 1 T PO SUBLINGUAL ROUTE QID (Patient not taking: Reported on 2022)  3    LINZESS 145 MCG capsule TK 1 C PO D ON AN EMPTY STOMACH AT LEAST 30 BEFORE 1ST MEAL OF THE DAY (Patient not taking: Reported on 2022)  3    LORazepam (ATIVAN) 1 MG tablet Take 1 tablet (1 mg) by mouth every 8 hours as needed for anxiety (prior to breast MRI) Take 30 minutes prior to departure.  Do not operate a vehicle after taking this medication (Patient not taking: Reported on 2022) 1 tablet 0    LORazepam (ATIVAN) 1 MG tablet Take 0.5 tablets (0.5 mg) by mouth every 8 hours as needed for anxiety (prior to breast MRI) Take 30 minutes prior to departure.  Do not operate a vehicle after taking this medication (Patient not taking: Reported on 2022) 2 tablet 0    metoclopramide (REGLAN) 5 MG tablet Take 1 tablet (5 mg) by mouth 3 times daily as needed (Patient not taking: Reported on 2022) 30 tablet 0        Family History:  Family History   Problem Relation Age of Onset    Breast Cancer Mother 52         at 52    Thyroid Disease Mother     Psychotic Disorder Mother     Obesity Mother     Diabetes Father     Obesity Father     Psychotic Disorder Father     Hearing Loss Father     Colon Cancer Father 65        cause of death    Psychotic Disorder Sister     Lung Cancer Maternal Grandmother          at 70    Cerebrovascular Disease Maternal Grandfather     Diabetes Maternal Grandfather      Breast Cancer Paternal Grandmother 40        recurrence in 70s/recurrence in 70s,  at 70    Breast Cancer Maternal Aunt 40         in 70s    Cancer Maternal Aunt 51        brain or bone cancer    Cancer Paternal Aunt         unknown,  in 70s after reaction to chemo    Colon Polyps Paternal Uncle         more than 10 colon polyps    Other - See Comments Paternal Uncle         3 deaths with CoVid    Colon Cancer Cousin 50        maternal cousin       Social History:  Social History     Socioeconomic History    Marital status:      Spouse name: Sukumar    Number of children: 2    Years of education: Not on file    Highest education level: Not on file   Occupational History     Employer: [employer]   Tobacco Use    Smoking status: Never     Passive exposure: Past    Smokeless tobacco: Never   Substance and Sexual Activity    Alcohol use: Yes     Alcohol/week: 0.0 standard drinks of alcohol     Comment: very rarely    Drug use: No    Sexual activity: Yes     Partners: Male     Birth control/protection: Condom   Other Topics Concern    Parent/sibling w/ CABG, MI or angioplasty before 65F 55M? No     Service No    Blood Transfusions No    Caffeine Concern No    Occupational Exposure No    Hobby Hazards No    Sleep Concern No    Stress Concern Yes    Weight Concern Yes     Comment: would like to lose weight    Special Diet Yes     Comment: elimination dairy    Back Care Yes    Exercise No    Bike Helmet Not Asked     Comment: NA, don't ride    Seat Belt Yes    Self-Exams No   Social History Narrative    Not on file     Social Determinants of Health     Financial Resource Strain: Low Risk  (2022)    [facility]    Financial Resource Strain     Difficulty of Paying Living Expenses: 3     Difficulty of Paying Living Expenses: Not on file   Food Insecurity: No Food Insecurity (2022)     "Received from [facility], [facility]    Food Insecurity     Worried About Running Out of Food in the Last Year: 1   Transportation Needs: No Transportation Needs (12/9/2022)    Received from [facility], [facility]    Transportation Needs     Lack of Transportation (Medical): 1   Physical Activity: Not on file   Stress: Not on file   Social Connections: Unknown (12/11/2023)    Received from [facility]    Social Connections     Frequency of Communication with Friends and Family: Not on file   Interpersonal Safety: Not At Risk (5/30/2023)    Humiliation, Afraid, Rape, and Kick questionnaire     Fear of Current or Ex-Partner: No     Emotionally Abused: No     Physically Abused: No     Sexually Abused: No   Housing Stability: Low Risk  (12/9/2022)    Received from [facility], [facility]    Housing Stability     Unable to Pay for Housing in the Last Year: 1       Physical Exam:  /83 (BP Location: Right arm, Patient Position: Sitting, Cuff Size: Adult Regular)   Pulse 101   Temp 98.7  F (37.1  C) (Oral)   Resp 14   Ht 1.76 m (5' 9.29\")   Wt 87.9 kg (193 lb 11.2 oz)   SpO2 97%   BMI 28.36 kg/m    GENERAL APPEARANCE: healthy, alert and no apparent distress  BREAST: A multipositional, bilateral breast exam was performed.  Fairly symmetrical -Rsl>L. Right breast: no palpable dominant masses, no nipple discharge, no skin changes. Dense tissue.  Right axilla: no palpable adenopathy. Left breast: no palpable dominant masses, no nipple discharge, no skin changes. Left axilla: no palpable adenopathy. Dense tissue.    LYMPHATICS: No cervical, supraclavicular, or axillary lymphadenopathy  SKIN: no suspicious lesions or rashes on examined skin    Laboratory/Imaging Studies  No results " found for any visits on 05/30/24.    ASSESSMENT    Maritza reports that she is doing well at this visit. She has no concerns with her breast tissue at this time, and no updates to her family history. We discussed her last screening tests and reviewed results, all of which have been negative.  We discussed concerns and symptoms to be watchful for between visits, including breast lumps, bumps, nipple inversion, nipple discharge and any changes in skin including crinkled or puckered skin. We reviewed the recommendation for self breast awareness. We will proceed with the plan below.       Individualized Surveillance Plan for women  With 20% or greater lifetime risk of breast cancer   Per NCCN Breast Cancer Screening and Diagnosis Guidelines Version 1.2023   Recommended screening Test or procedure Last done Next Scheduled    Clinical encounter Clinical exam every 6-12 months.   Refer to genetic counseling if not already done.  Consider risk reduction strategies.   May 2024   May 2025   However, some family histories with breast cancers at a very young age, may warrant screening starting earlier.    *May begin at age 40 if breast cancers in the family occur at later ages.    Annual mammogram beginning 10 years younger than the earliest breast cancer in the family but not prior to age 30.    Recommend annual breast MRI to begin 10 years younger than the earliest breast cancer in the family but not prior to age 25.    Breast MRIs are preferably done on day 7-15 of the menstrual cycle in premenopausal women.   5/30/2023: Screening tomosynthesis mammogram, BiRads1    11/17/2023: Breast MRI, BiRads1   Mammogram today    Breast MRI in November (11/18 or later)    Return to clinic in May 2025 with a mammogram following our visit   Breast screening for patients at high risk due to thoracic radiation between the ages of 10-30   Annual clinical exam beginning 8 years after radiation therapy.    Annual screening mammogram beginning at  age 30 or 8 years after radiation therapy    Annual breast MRI, beginning at age 25 or 8 years after radiation therapy.     NA   NA   Women who have a lifetime risk of >20% based on history of LCIS or ADH/ALH Annual screening mammogram beginning at age of LCIS or ADH/ALH but not prior to age 30.    Consider annual MRI to begin at age of diagnosis of LCIS or ADH/ALH but not prior to age 25.    Consider risk reducing strategies.   NA   NA    Recommend risk reducing strategies for women with 1.7% 5 year risk of breast cancer.       I spent a total of 30 minutes on the day of the visit. Please see the note for further information on patient assessment and treatment.     Radha Flores, DADA, APRN, AGCNS-BC  Clinical Nurse Specialist  Cancer Risk Management Program  [facility]    Cc: Ollie Loera PA-C

## 2024-05-30 NOTE — NURSING NOTE
"Oncology Rooming Note    May 30, 2024 2:41 PM   Maritza Hanson is a 48 year old adult who presents for:    Chief Complaint   Patient presents with    Oncology Clinic Visit     RTN for Breast Cancer Screening     Initial Vitals: /83 (BP Location: Right arm, Patient Position: Sitting, Cuff Size: Adult Regular)   Pulse 101   Temp 98.7  F (37.1  C) (Oral)   Resp 14   Ht 1.76 m (5' 9.29\")   Wt 87.9 kg (193 lb 11.2 oz)   SpO2 97%   BMI 28.36 kg/m   Estimated body mass index is 28.36 kg/m  as calculated from the following:    Height as of this encounter: 1.76 m (5' 9.29\").    Weight as of this encounter: 87.9 kg (193 lb 11.2 oz). Body surface area is 2.07 meters squared.  No Pain (0) Comment: Data Unavailable   No LMP recorded.  Allergies reviewed: Yes  Medications reviewed: Yes    Medications: Medication refills not needed today.  Pharmacy name entered into [facility]: [id number] - [place], [place] - [address]    Frailty Screening:   Is the patient here for a new oncology consult visit in cancer care? 2. No      Clinical concerns:  None      [place]              "

## 2024-05-30 NOTE — PATIENT INSTRUCTIONS
Individualized Surveillance Plan for women  With 20% or greater lifetime risk of breast cancer   Per NCCN Breast Cancer Screening and Diagnosis Guidelines Version 1.2023   Recommended screening Test or procedure Last done Next Scheduled    Clinical encounter Clinical exam every 6-12 months.   Refer to genetic counseling if not already done.  Consider risk reduction strategies.   May 2024   May 2025   However, some family histories with breast cancers at a very young age, may warrant screening starting earlier.    *May begin at age 40 if breast cancers in the family occur at later ages.    Annual mammogram beginning 10 years younger than the earliest breast cancer in the family but not prior to age 30.    Recommend annual breast MRI to begin 10 years younger than the earliest breast cancer in the family but not prior to age 25.    Breast MRIs are preferably done on day 7-15 of the menstrual cycle in premenopausal women.   5/30/2023: Screening tomosynthesis mammogram, BiRads1    11/17/2023: Breast MRI, BiRads1   Mammogram today    Breast MRI in November (11/18 or later)    Return to clinic in May 2025 with a mammogram following our visit   Breast screening for patients at high risk due to thoracic radiation between the ages of 10-30   Annual clinical exam beginning 8 years after radiation therapy.    Annual screening mammogram beginning at age 30 or 8 years after radiation therapy    Annual breast MRI, beginning at age 25 or 8 years after radiation therapy.     NA   NA   Women who have a lifetime risk of >20% based on history of LCIS or ADH/ALH Annual screening mammogram beginning at age of LCIS or ADH/ALH but not prior to age 30.    Consider annual MRI to begin at age of diagnosis of LCIS or ADH/ALH but not prior to age 25.    Consider risk reducing strategies.   NA   NA    Recommend risk reducing strategies for women with 1.7% 5 year risk of breast cancer.

## 2024-06-05 ENCOUNTER — ANCILLARY PROCEDURE (OUTPATIENT)
Dept: MAMMOGRAPHY | Facility: CLINIC | Age: 49
End: 2024-06-05
Attending: CLINICAL NURSE SPECIALIST
Payer: COMMERCIAL

## 2024-06-05 DIAGNOSIS — R92.8 ABNORMAL MAMMOGRAM OF LEFT BREAST: ICD-10-CM

## 2024-06-05 PROCEDURE — 76642 ULTRASOUND BREAST LIMITED: CPT | Mod: LT | Performed by: STUDENT IN AN ORGANIZED HEALTH CARE EDUCATION/TRAINING PROGRAM

## 2024-06-05 PROCEDURE — 77065 DX MAMMO INCL CAD UNI: CPT | Mod: LT | Performed by: STUDENT IN AN ORGANIZED HEALTH CARE EDUCATION/TRAINING PROGRAM

## 2024-06-05 PROCEDURE — G0279 TOMOSYNTHESIS, MAMMO: HCPCS | Performed by: STUDENT IN AN ORGANIZED HEALTH CARE EDUCATION/TRAINING PROGRAM

## 2024-11-11 DIAGNOSIS — F41.9 ANXIETY: ICD-10-CM

## 2024-11-11 RX ORDER — LORAZEPAM 1 MG/1
1 TABLET ORAL ONCE
Qty: 1 TABLET | Refills: 0 | Status: SHIPPED | OUTPATIENT
Start: 2024-11-11 | End: 2024-11-11

## 2024-11-18 ENCOUNTER — HOSPITAL ENCOUNTER (OUTPATIENT)
Dept: MRI IMAGING | Facility: CLINIC | Age: 49
Discharge: HOME | End: 2024-11-18
Payer: COMMERCIAL

## 2024-11-18 DIAGNOSIS — Z12.39 BREAST CANCER SCREENING, HIGH RISK PATIENT: ICD-10-CM

## 2024-11-18 DIAGNOSIS — Z80.3 FAMILY HISTORY OF MALIGNANT NEOPLASM OF BREAST: ICD-10-CM

## 2024-11-18 DIAGNOSIS — R92.30 DENSE BREAST: ICD-10-CM

## 2024-11-18 PROCEDURE — A9585 GADOBUTROL INJECTION: HCPCS

## 2024-11-18 PROCEDURE — 77049 MRI BREAST C-+ W/CAD BI: CPT

## 2024-11-18 PROCEDURE — 255N000002 HC RX 255 OP 636

## 2024-11-18 RX ORDER — GADOBUTROL 604.72 MG/ML
9 INJECTION INTRAVENOUS ONCE
Status: COMPLETED | OUTPATIENT
Start: 2024-11-18 | End: 2024-11-18

## 2024-11-18 RX ADMIN — GADOBUTROL 9 ML: 604.72 INJECTION INTRAVENOUS at 10:33

## 2025-01-15 ENCOUNTER — APPOINTMENT (OUTPATIENT)
Dept: ULTRASOUND IMAGING | Facility: CLINIC | Age: 50
End: 2025-01-15
Attending: STUDENT IN AN ORGANIZED HEALTH CARE EDUCATION/TRAINING PROGRAM
Payer: COMMERCIAL

## 2025-01-15 ENCOUNTER — HOSPITAL ENCOUNTER (EMERGENCY)
Facility: CLINIC | Age: 50
Discharge: HOME | End: 2025-01-15
Attending: STUDENT IN AN ORGANIZED HEALTH CARE EDUCATION/TRAINING PROGRAM
Payer: COMMERCIAL

## 2025-01-15 ENCOUNTER — APPOINTMENT (OUTPATIENT)
Dept: CT IMAGING | Facility: CLINIC | Age: 50
End: 2025-01-15
Attending: STUDENT IN AN ORGANIZED HEALTH CARE EDUCATION/TRAINING PROGRAM
Payer: COMMERCIAL

## 2025-01-15 VITALS
OXYGEN SATURATION: 98 % | WEIGHT: 195.77 LBS | RESPIRATION RATE: 16 BRPM | HEART RATE: 98 BPM | BODY MASS INDEX: 29 KG/M2 | HEIGHT: 69 IN | SYSTOLIC BLOOD PRESSURE: 124 MMHG | TEMPERATURE: 98.3 F | DIASTOLIC BLOOD PRESSURE: 79 MMHG

## 2025-01-15 DIAGNOSIS — N83.201 RIGHT OVARIAN CYST: ICD-10-CM

## 2025-01-15 DIAGNOSIS — R10.9 RIGHT FLANK PAIN: ICD-10-CM

## 2025-01-15 DIAGNOSIS — K76.0 HEPATIC STEATOSIS: ICD-10-CM

## 2025-01-15 DIAGNOSIS — D25.9 UTERINE LEIOMYOMA, UNSPECIFIED LOCATION: ICD-10-CM

## 2025-01-15 LAB
ALBUMIN SERPL BCG-MCNC: 4.7 G/DL (ref 3.5–5.2)
ALBUMIN UR-MCNC: 10 MG/DL
ALP SERPL-CCNC: 66 U/L (ref 40–150)
ALT SERPL W P-5'-P-CCNC: 17 U/L (ref 0–70)
ANION GAP SERPL CALCULATED.3IONS-SCNC: 14 MMOL/L (ref 7–15)
APPEARANCE UR: CLEAR
AST SERPL W P-5'-P-CCNC: 34 U/L (ref 0–45)
BACTERIA #/AREA URNS HPF: ABNORMAL /HPF
BASOPHILS # BLD AUTO: 0 10E3/UL (ref 0–0.2)
BASOPHILS NFR BLD AUTO: 1 %
BILIRUB SERPL-MCNC: 0.4 MG/DL
BILIRUB UR QL STRIP: NEGATIVE
BUN SERPL-MCNC: 16.9 MG/DL (ref 6–20)
CALCIUM SERPL-MCNC: 9.2 MG/DL (ref 8.8–10.4)
CHLORIDE SERPL-SCNC: 102 MMOL/L (ref 98–107)
COLOR UR AUTO: YELLOW
CREAT SERPL-MCNC: 0.71 MG/DL (ref 0.51–1.17)
D DIMER PPP FEU-MCNC: 0.32 UG/ML FEU (ref 0–0.5)
EGFRCR SERPLBLD CKD-EPI 2021: >90 ML/MIN/1.73M2
EOSINOPHIL # BLD AUTO: 0.1 10E3/UL (ref 0–0.7)
EOSINOPHIL NFR BLD AUTO: 2 %
ERYTHROCYTE [DISTWIDTH] IN BLOOD BY AUTOMATED COUNT: 12.2 % (ref 10–15)
GLUCOSE SERPL-MCNC: 103 MG/DL (ref 70–99)
GLUCOSE UR STRIP-MCNC: NEGATIVE MG/DL
HCO3 SERPL-SCNC: 22 MMOL/L (ref 22–29)
HCT VFR BLD AUTO: 42 % (ref 35–53)
HGB BLD-MCNC: 14.2 G/DL (ref 11.7–17.7)
HGB UR QL STRIP: ABNORMAL
IMM GRANULOCYTES # BLD: 0.1 10E3/UL
IMM GRANULOCYTES NFR BLD: 1 %
KETONES UR STRIP-MCNC: NEGATIVE MG/DL
LEUKOCYTE ESTERASE UR QL STRIP: NEGATIVE
LIPASE SERPL-CCNC: 33 U/L (ref 13–60)
LYMPHOCYTES # BLD AUTO: 2.2 10E3/UL (ref 0.8–5.3)
LYMPHOCYTES NFR BLD AUTO: 28 %
MCH RBC QN AUTO: 29.8 PG (ref 26.5–33)
MCHC RBC AUTO-ENTMCNC: 33.8 G/DL (ref 31.5–36.5)
MCV RBC AUTO: 88 FL (ref 78–100)
MONOCYTES # BLD AUTO: 0.5 10E3/UL (ref 0–1.3)
MONOCYTES NFR BLD AUTO: 6 %
MUCOUS THREADS #/AREA URNS LPF: PRESENT /LPF
NEUTROPHILS # BLD AUTO: 5 10E3/UL (ref 1.6–8.3)
NEUTROPHILS NFR BLD AUTO: 64 %
NITRATE UR QL: NEGATIVE
NRBC # BLD AUTO: 0 10E3/UL
NRBC BLD AUTO-RTO: 0 /100
PH UR STRIP: 5.5 [PH] (ref 5–7)
PLATELET # BLD AUTO: 267 10E3/UL (ref 150–450)
POTASSIUM SERPL-SCNC: 3.8 MMOL/L (ref 3.4–5.3)
PROT SERPL-MCNC: 7.6 G/DL (ref 6.4–8.3)
RBC # BLD AUTO: 4.76 10E6/UL (ref 3.8–5.9)
RBC URINE: 2 /HPF
SODIUM SERPL-SCNC: 138 MMOL/L (ref 135–145)
SP GR UR STRIP: 1.03 (ref 1–1.03)
SQUAMOUS EPITHELIAL: 10 /HPF
UROBILINOGEN UR STRIP-MCNC: NORMAL MG/DL
WBC # BLD AUTO: 7.8 10E3/UL (ref 4–11)
WBC URINE: 2 /HPF

## 2025-01-15 PROCEDURE — 85379 FIBRIN DEGRADATION QUANT: CPT | Performed by: STUDENT IN AN ORGANIZED HEALTH CARE EDUCATION/TRAINING PROGRAM

## 2025-01-15 PROCEDURE — 76830 TRANSVAGINAL US NON-OB: CPT

## 2025-01-15 PROCEDURE — 85004 AUTOMATED DIFF WBC COUNT: CPT | Performed by: STUDENT IN AN ORGANIZED HEALTH CARE EDUCATION/TRAINING PROGRAM

## 2025-01-15 PROCEDURE — 250N000011 HC RX IP 250 OP 636: Performed by: STUDENT IN AN ORGANIZED HEALTH CARE EDUCATION/TRAINING PROGRAM

## 2025-01-15 PROCEDURE — 76856 US EXAM PELVIC COMPLETE: CPT

## 2025-01-15 PROCEDURE — 36415 COLL VENOUS BLD VENIPUNCTURE: CPT | Performed by: STUDENT IN AN ORGANIZED HEALTH CARE EDUCATION/TRAINING PROGRAM

## 2025-01-15 PROCEDURE — 96374 THER/PROPH/DIAG INJ IV PUSH: CPT

## 2025-01-15 PROCEDURE — 83690 ASSAY OF LIPASE: CPT | Performed by: STUDENT IN AN ORGANIZED HEALTH CARE EDUCATION/TRAINING PROGRAM

## 2025-01-15 PROCEDURE — 80053 COMPREHEN METABOLIC PANEL: CPT | Performed by: STUDENT IN AN ORGANIZED HEALTH CARE EDUCATION/TRAINING PROGRAM

## 2025-01-15 PROCEDURE — 99285 EMERGENCY DEPT VISIT HI MDM: CPT | Mod: 25

## 2025-01-15 PROCEDURE — 74176 CT ABD & PELVIS W/O CONTRAST: CPT

## 2025-01-15 PROCEDURE — 81001 URINALYSIS AUTO W/SCOPE: CPT | Performed by: STUDENT IN AN ORGANIZED HEALTH CARE EDUCATION/TRAINING PROGRAM

## 2025-01-15 RX ORDER — KETOROLAC TROMETHAMINE 15 MG/ML
15 INJECTION, SOLUTION INTRAMUSCULAR; INTRAVENOUS ONCE
Status: COMPLETED | OUTPATIENT
Start: 2025-01-15 | End: 2025-01-15

## 2025-01-15 RX ADMIN — KETOROLAC TROMETHAMINE 15 MG: 15 INJECTION, SOLUTION INTRAMUSCULAR; INTRAVENOUS at 09:34

## 2025-01-15 ASSESSMENT — ACTIVITIES OF DAILY LIVING (ADL)
ADLS_ACUITY_SCORE: 41

## 2025-01-15 ASSESSMENT — COLUMBIA-SUICIDE SEVERITY RATING SCALE - C-SSRS
1. IN THE PAST MONTH, HAVE YOU WISHED YOU WERE DEAD OR WISHED YOU COULD GO TO SLEEP AND NOT WAKE UP?: NO
2. HAVE YOU ACTUALLY HAD ANY THOUGHTS OF KILLING YOURSELF IN THE PAST MONTH?: NO
6. HAVE YOU EVER DONE ANYTHING, STARTED TO DO ANYTHING, OR PREPARED TO DO ANYTHING TO END YOUR LIFE?: NO

## 2025-01-15 NOTE — ED TRIAGE NOTES
Pt reports that this morning she started having intermittent episodes of right rib pain, Pt reports that there is no radiation no changes in bowel or bladder and no trauma PT VSS and ABC's intact

## 2025-01-15 NOTE — DISCHARGE INSTRUCTIONS
Return to the emergency department if symptoms are worsening, become concerning, or for any other concerns. Follow-up with your doctor in 2-3 and sooner if needed.        Discharge Instructions  Abdominal Pain    Abdominal pain (belly pain) can be caused by many things. Your evaluation today does not show the exact cause for your pain. Your provider today has decided that it is unlikely your pain is due to a life threatening problem, or a problem requiring surgery or hospital admission. Sometimes those problems cannot be found right away, so it is very important that you follow up as directed.  Sometimes only the changes which occur over time allow the cause of your pain to be found.    Generally, every Emergency Department visit should have a follow-up clinic visit with either a primary or a specialty clinic/provider. Please follow-up as instructed by your emergency provider today. With abdominal pain, we often recommend very close follow-up, such as the following day.    ADULTS:  Return to the Emergency Department right away if:    You get an oral temperature above 102oF or as directed by your provider.  You have blood in your stools. This may be bright red or appear as black, tarry stools.    You keep vomiting (throwing up) or cannot drink liquids.  You see blood when you vomit.   You cannot have a bowel movement or you cannot pass gas.  Your stomach gets bloated or bigger.  Your skin or the whites of your eyes look yellow.  You faint.  You have bloody, frequent or painful urination (peeing).  You have new symptoms or anything that worries you.    CHILDREN:  Return to the Emergency Department right away if your child has any of the above-listed symptoms or the following:    Pushes your hand away or screams/cries when his/her belly is touched.  You notice your child is very fussy or weak.  Your child is very tired and is too tired to eat or drink.  Your child is dehydrated.  Signs of dehydration can  be:  Significant change in the amount of wet diapers/urine.  Your infant or child starts to have dry mouth and lips, or no saliva (spit) or tears.    PREGNANT WOMEN:  Return to the Emergency Department right away if you have any of the above-listed symptoms or the following:    You have bleeding, leaking fluid or passing tissue from the vagina.  You have worse pain or cramping, or pain in your shoulder or back.  You have vomiting that will not stop.  You have a temperature of 100oF or more.  Your baby is not moving as much as usual.  You faint.  You get a bad headache with or without eye problems and abdominal pain.  You have a seizure.  You have unusual discharge from your vagina and abdominal pain.    Abdominal pain is pretty common during pregnancy.  Your pain may or may not be related to your pregnancy. You should follow-up closely with your OB provider so they can evaluate you and your baby.  Until you follow-up with your regular provider, do the following:     Avoid sex and do not put anything in your vagina.  Drink clear fluids.  Only take medications approved by your provider.    MORE INFORMATION:    Appendicitis:  A possible cause of abdominal pain in any person who still has their appendix is acute appendicitis. Appendicitis is often hard to diagnose.  Testing does not always rule out early appendicitis or other causes of abdominal pain. Close follow-up with your provider and re-evaluations may be needed to figure out the reason for your abdominal pain.    Follow-up:  It is very important that you make an appointment with your clinic and go to the appointment.  If you do not follow-up with your primary provider, it may result in missing an important development which could result in permanent injury or disability and/or lasting pain.  If there is any problem keeping your appointment, call your provider or return to the Emergency Department.    Medications:  Take your medications as directed by your  "provider today.  Before using over-the-counter medications, ask your provider and make sure to take the medications as directed.  If you have any questions about medications, ask your provider.    Diet:  Resume your normal diet as much as possible, but do not eat fried, fatty or spicy foods while you have pain.  Do not drink alcohol or have caffeine.  Do not smoke tobacco.    Probiotics: If you have been given an antibiotic, you may want to also take a probiotic pill or eat yogurt with live cultures. Probiotics have \"good bacteria\" to help your intestines stay healthy. Studies have shown that probiotics help prevent diarrhea (loose stools) and other intestine problems (including C. diff infection) when you take antibiotics. You can buy these without a prescription in the pharmacy section of the store.     If you were given a prescription for medicine here today, be sure to read all of the information (including the package insert) that comes with your prescription.  This will include important information about the medicine, its side effects, and any warnings that you need to know about.  The pharmacist who fills the prescription can provide more information and answer questions you may have about the medicine.  If you have questions or concerns that the pharmacist cannot address, please call or return to the Emergency Department.       Remember that you can always come back to the Emergency Department if you are not able to see your regular provider in the amount of time listed above, if you get any new symptoms, or if there is anything that worries you.    "

## 2025-01-15 NOTE — ED PROVIDER NOTES
"  Emergency Department Note      History of Present Illness     Chief Complaint   Rib Pain      HPI   Maritza Hanson is a 49 year old adult who presents for evaluation of right flank pain. Maritza reports sudden onset of this pain around 0800 today while lying in bed. The pain is intermittent and is sharp when it comes on. She denies shortness of breath, vomiting, diaphoresis, urine symptoms, abdominal pain, fever, hemoptysis, or leg pain or swelling. No history of blood clots. She has Mirena IUD. No recent surgeries or increased stress.    Independent Historian   None    Review of External Notes   OB/GYN note September 27, 2024-discussed IUD and bleeding.    Past Medical History     Medical History and Problem List   MDD  DIMITRIOS  RLS  GERD  Hyperlipidemia  POTS  Tourette's syndrome  Laryngeal granuloma  Abnormal uterine bleeding  Uterine polyp  Benign neoplasm of ascending colon and cecum  Biliary sludge  Bilious vomiting  Cyclical vomiting syndrome  Steatosis of liver  Tuberculosis of vertebral column  Anemia  Asthma    Medications   Mirena IUD  Nortriptyline  Prochlorperazine  Promethazine  Rosuvastatin    Surgical History   Cholecystectomy  [place] teeth extraction  Removal of sesamoid bone right foot    Physical Exam     Patient Vitals for the past 24 hrs:   BP Temp Temp src Pulse Resp SpO2 Height Weight   01/15/25 1024 (!) 146/89 98.3  F (36.8  C) Oral 104 16 98 % -- --   01/15/25 0858 (!) 163/88 98.2  F (36.8  C) Temporal (!) 124 18 98 % 1.753 m (5' 9\") 88.8 kg (195 lb 12.3 oz)     Physical Exam  GENERAL: Patient well-appearing  HEAD: Atraumatic.  NECK: No rigidity  CV: Tachycardic and regular, no murmurs, rubs or gallops  PULM: CTAB with good aeration; no retractions, rales, rhonchi, or wheezing  ABD: Soft, nontender, nondistended, no guarding  DERM: No rash. Skin warm and dry  EXTREMITY: Moving all extremities without difficulty. No calf tenderness or peripheral edema       Diagnostics     Lab Results   Labs " Ordered and Resulted from Time of ED Arrival to Time of ED Departure   COMPREHENSIVE METABOLIC PANEL - Abnormal       Result Value    Sodium 138      Potassium 3.8      Carbon Dioxide (CO2) 22      Anion Gap 14      Urea Nitrogen 16.9      Creatinine 0.71      GFR Estimate >90      Calcium 9.2      Chloride 102      Glucose 103 (*)     Alkaline Phosphatase 66      AST 34      ALT 17      Protein Total 7.6      Albumin 4.7      Bilirubin Total 0.4     ROUTINE UA WITH MICROSCOPIC REFLEX TO CULTURE - Abnormal    Color Urine Yellow      Appearance Urine Clear      Glucose Urine Negative      Bilirubin Urine Negative      Ketones Urine Negative      Specific Gravity Urine 1.030      Blood Urine Trace (*)     pH Urine 5.5      Protein Albumin Urine 10 (*)     Urobilinogen Urine Normal      Nitrite Urine Negative      Leukocyte Esterase Urine Negative      Bacteria Urine Few (*)     Mucus Urine Present (*)     RBC Urine 2      WBC Urine 2      Squamous Epithelials Urine 10 (*)    LIPASE - Normal    Lipase 33     D DIMER QUANTITATIVE - Normal    D-Dimer Quantitative 0.32     CBC WITH PLATELETS AND DIFFERENTIAL    WBC Count 7.8      RBC Count 4.76      Hemoglobin 14.2      Hematocrit 42.0      MCV 88      MCH 29.8      MCHC 33.8      RDW 12.2      Platelet Count 267      % Neutrophils 64      % Lymphocytes 28      % Monocytes 6      % Eosinophils 2      % Basophils 1      % Immature Granulocytes 1      NRBCs per 100 WBC 0      Absolute Neutrophils 5.0      Absolute Lymphocytes 2.2      Absolute Monocytes 0.5      Absolute Eosinophils 0.1      Absolute Basophils 0.0      Absolute Immature Granulocytes 0.1      Absolute NRBCs 0.0         Imaging   CT Abdomen Pelvis w/o Contrast   Final Result   IMPRESSION:    1.  No acute findings in the abdomen and pelvis.   2.  Right ovarian 2.8 cm cyst. Pelvic ultrasound can be considered.   3.  Hepatic steatosis.         US Pelvis Cmplt w Transvag & Doppler LmtPel Duplex Limited     (Results Pending)       Independent Interpretation   None    ED Course      Medications Administered   Medications   ketorolac (TORADOL) injection 15 mg (15 mg Intravenous $Given 1/15/25 0944)       Procedures   Procedures     Discussion of Management   None    ED Course   ED Course as of 01/15/25 1235   Wed Grayson 15, 2025   0910 I obtained history and examined the patient as noted above.        Additional Documentation  None    Medical Decision Making / Diagnosis     CMS Diagnoses: None    MIPS       None    MDM   Maritza Hanson is a 49 year old adult present with nonspecific right-sided flank pain.  Initial vital signs notable for hypertension and tachycardia.  Patient does have POTS, therefore does get recurrent tachycardia.  Basic labs are overall reassuring.  UA is unremarkable.  Lowers by [place] and D-dimer negative.  Not consistent with PE.  Did order CT scan to exclude occult kidney stone and there is no kidney stone.  She has hepatic steatosis.  There is a right ovarian cyst, therefore proceeded to ovarian ultrasound.    Ultrasound not showing evidence of torsion.  Clinically doubt torsion. Appropriate place IUD.  She has a fibroid and ovarian cysts.    On reassessment, she continues to look well.  She states that the sharp spasms happen every minute or so.  Potentially a muscle spasm.  However do not think she requires further intervention or workup.  Recommended stretching exercises.    Patient stable for discharge. All questions answered. Given strict return precautions. Patient content with plan. The differential diagnosis and treatment modalities were discussed thoroughly with the patient. Recommended PCP follow-up in 2-3 days if needed.      Disposition   The patient was discharged.     Diagnosis     ICD-10-CM    1. Right ovarian cyst  N83.201       2. Hepatic steatosis  K76.0            Discharge Medications   New Prescriptions    No medications on file         Scribe Disclosure:  Angelita PATEL  Alhaji, am serving as a scribe at 11:17 AM on 1/15/2025 to document services personally performed by Cristofer Gutierrez MD based on my observations and the provider's statements to me.        Cristofer Gutierrez MD  01/15/25 8457

## 2025-06-12 ENCOUNTER — ANCILLARY PROCEDURE (OUTPATIENT)
Dept: MAMMOGRAPHY | Facility: CLINIC | Age: 50
End: 2025-06-12
Payer: COMMERCIAL

## 2025-06-12 ENCOUNTER — PATIENT OUTREACH (OUTPATIENT)
Dept: ONCOLOGY | Facility: CLINIC | Age: 50
End: 2025-06-12

## 2025-06-12 ENCOUNTER — ONCOLOGY VISIT (OUTPATIENT)
Dept: ONCOLOGY | Facility: CLINIC | Age: 50
End: 2025-06-12
Payer: COMMERCIAL

## 2025-06-12 VITALS
OXYGEN SATURATION: 98 % | DIASTOLIC BLOOD PRESSURE: 74 MMHG | WEIGHT: 194 LBS | SYSTOLIC BLOOD PRESSURE: 108 MMHG | RESPIRATION RATE: 16 BRPM | BODY MASS INDEX: 28.65 KG/M2 | TEMPERATURE: 97.9 F | HEART RATE: 80 BPM

## 2025-06-12 DIAGNOSIS — R92.30 DENSE BREAST: ICD-10-CM

## 2025-06-12 DIAGNOSIS — Z80.3 FAMILY HISTORY OF MALIGNANT NEOPLASM OF BREAST: ICD-10-CM

## 2025-06-12 DIAGNOSIS — Z91.89 AT HIGH RISK FOR BREAST CANCER: Primary | ICD-10-CM

## 2025-06-12 DIAGNOSIS — Z12.39 BREAST CANCER SCREENING, HIGH RISK PATIENT: ICD-10-CM

## 2025-06-12 DIAGNOSIS — F41.9 ANXIETY: ICD-10-CM

## 2025-06-12 PROCEDURE — 77067 SCR MAMMO BI INCL CAD: CPT | Mod: GC | Performed by: RADIOLOGY

## 2025-06-12 PROCEDURE — 99213 OFFICE O/P EST LOW 20 MIN: CPT

## 2025-06-12 PROCEDURE — 77063 BREAST TOMOSYNTHESIS BI: CPT | Mod: GC | Performed by: RADIOLOGY

## 2025-06-12 RX ORDER — LORAZEPAM 1 MG/1
1 TABLET ORAL
Qty: 1 TABLET | Refills: 0 | Status: SHIPPED | OUTPATIENT
Start: 2025-06-12

## 2025-06-12 ASSESSMENT — PAIN SCALES - GENERAL: PAINLEVEL_OUTOF10: NO PAIN (0)

## 2025-06-12 NOTE — PATIENT INSTRUCTIONS
Individualized Surveillance Plan for women  With 20% or greater lifetime risk of breast cancer   Per NCCN Breast Cancer Risk Reduction Guidelines Version 2.2024   Recommended screening Test or procedure Last done Next Scheduled    Clinical encounter Breast self awareness.  Clinical breast exam every 6-12 months.   Refer to genetic counseling if not already done.  Consider risk reduction strategies.   June 2025 June 2026   Increased risk of breast cancer based on models that are largely dependant on family history (CALIN)    Women who have a lifetime risk of >20% based on history of LCIS or ADH/ALH    *May begin at age 40 if breast cancers in the family occur at later ages.    Annual mammogram beginning 10 years younger than the earliest breast cancer in the family, or at age of LCIS or ADH/ALH but not prior to age 30.    Recommend annual breast MRI to begin 10 years younger than the earliest breast cancer in the family or at age of diagnosis of LCIS or ADH/ALH but not prior to age 25.    Breast MRIs are preferably done on day 7-15 of the menstrual cycle in premenopausal women.   5/30/2024: Screening tomosynthesis mammogram, BiRads0     6/5/2024: Diagnostic left breast mammogram and US, BiRads1    11/18/2024: Breast MRI, BiRads1   Mammogram today    Breast MRI in November    Return to clinic in June, 2026 with a mammogram following our visit     Breast screening for patients at high risk due to thoracic radiation between the ages of 10-30   Annual clinical exam beginning 8 years after radiation therapy.    Annual screening mammogram beginning 8 years after radiation therapy, not prior to age 25    Annual breast MRI, beginning 8 years after radiation therapy, not prior to age 25     NA   NA   Recommend risk reducing strategies for women with 1.7% 5 year risk of breast cancer by the Naty model, or 5% 10 year risk of developing breast cancer by the CALIN model.

## 2025-06-12 NOTE — PROGRESS NOTES
Kyler received Cancer Risk Management Program referral, referred for:    Family hx of cancer, met with genetic counseling in 2015, wants to meet with genetic cousneling to re-visit updating her genetic testing      Reviewed for appropriate plan, and sent to New Patient Scheduling for completion.

## 2025-06-12 NOTE — PROGRESS NOTES
Oncology Risk Management Consultation:  Date on this visit: 2025    Maritza Hanson requires high risk screening and surveillance for her risk of cancer secondary to having a family history of breast cancer in her mother at age 52,  maternal aunt in her 40s and paternal grandmother in her 40s. She has a 28.4% lifetime risk for breast cancer by the CALIN model. She has a 2.4% risk for breast cancer within the next 5 years by the Naty model.      Primary Physician: Ollie Loera PA-C     History Of Present Illness:  Ms. Hanson is a very pleasant, healthy 50 year old female who presents with a family history of breast cancer.      Genetic Testin2015 - negative for genetic mutations in 25 genes using a My Risk panel through [employer]. Maritza was found to be negative for genetic mutations in: APC, MATTHEW, BARD1, BMPR1A, BRCA1, BRCA2, BRIP1, CDH1, CDK4, CDKN2A, CHEK2, EPCAM (large rearrangement only), MLH1, MSH2, MSH6, MUTYH, NBN, PALB2, PMS2, PTEN, RAD51C, RAD51D, SMAD4, STK11, and TP53 genes. No mutations were found in any of the 25 genes analyzed. This test involved sequencing and deletion/duplication analysis of all genes with the exception of EPCAM (deletions only).     Pertinent history:  Menarche at age 14.  First child at age 34.   Premenopausal.  Ovaries and uterus intact. Hx of tubal ligation.  Density: scattered fibroglandular densities  History of breastfeeding both of her sons, for 6 months and 10 months, respectively.   History of OCPs 10 years and used one dose of Depo-Provera; she was unable to tolerate the medication, as it heightened her anxiety.  No history of HRT  2018- History of MRI guided R core biopsy, normal breast tissue on pathology.  No history of hyperplasia, atypia or malignancy.     Breast imaging history:  Breast MRI and mammogram in  from [place]. No records available; she states they were normal.  2015 -Screening mammogram, BiRads 0 for asymmetry in  R breast  6/25/2015 - Diagnostic mammogram, R breast - BiRads1, fibroglandular asymmetry in upper R breast  5/31/2016 - Breast MRI, BiRads1  11/7/2016 - Screening tomosynthesis mammogram, BiRads1.  12/4/2017 - Breast MRI, BiRads1  5/16/2018-Screening mammogram, BiRads1.  12/7/2018 - Breast MRI, BiRads0 for Nonmasslike area of  enhancement in the deep right breast at about 12:00, 10 cm from the nipple. The area measures 2.9 x 2.1 x 2.0 cm.   12/13/2018 - Diagnostic tomosynthesis mammogram and right US, both BiRads4 suspicious.  12/28/2018-  MRI guided R core biopsy, normal breast tissue on pathology.  5/31/2019 - Breast MRI, BiRads2.  12/18/2019- Screening tomosynthesis mammogram, BiRads1  11/11/2020- Breast MRI, BiRads3 for Probable benign background parenchymal enhancement at the 12:00 position of the left breast. Further evaluation with targeted ultrasound is recommended. If the ultrasound shows only normal tissue, routine high risk screening would be recommended.  11/17/2020- Left breast US follow up, BiRads2  05/21/2021- Screening tomosynthesis mammogram, BiRads1  11/15/2021- Breast MRI, BiRads1  5/6/2022-Screening tomosynthesis mammogram, BiRads1  11/16/2022- Breast MRI, BiRads1  5/30/2023: Screening tomosynthesis mammogram, BiRads1  11/17/2023: Breast MRI, BiRads1  5/30/2024: Screening tomosynthesis mammogram, BiRads0 for possible architectural distortion, left breast  6/5/2024: Diagnostic left breast mammogram and US, BiRads1  11/18/2024: Breast MRI, BiRads1     At this visit, she denies new fatigue, breast pain, asymmetry, lumps, masses, thickening, pain, nipple discharge and skin changes in her breasts.    Past Medical/Surgical History:  Past Medical History:   Diagnosis Date    Anemia during pregnancy and now    Asthma     Back pain     Depression with anxiety since teenage    History of tics     Mild preeclampsia     during both pregnancy, resolved    POTS (postural orthostatic tachycardia syndrome)       Past Surgical History:   Procedure Laterality Date    CHOLECYSTECTOMY, LAPOROSCOPIC  12/2017    removal of sesimoid bone right foot   1994    [place] teeth extraction  1998       Allergies:  Allergies as of 06/12/2025 - Reviewed 06/12/2025   Allergen Reaction Noted    Dog epithelium (canis lupus familiaris)  12/13/2019    Dogs  12/13/2019    Dust mites Itching 05/21/2021    Grass  12/13/2019    Hydrocodone Itching 11/13/2012    Hydrocodone, benzhydrocodone, and hydromorphone Itching 09/19/2022    Misc. sulfonamide containing compounds  05/30/2023    Mold  12/13/2019    Ondansetron Itching 05/16/2018    Sulfa antibiotics Hives 11/13/2012    Trees  12/13/2019    Adhesive tape Rash 02/03/2021    Cats Itching and Rash 05/21/2021       Current Medications:  Current Outpatient Medications   Medication Sig Dispense Refill    EPINEPHrine (EPIPEN/ADRENACLICK/OR ANY BX GENERIC EQUIV) 0.3 MG/0.3ML injection 2-pack 1 mg  0    levonorgestrel (MIRENA) 52 MG (20 mcg/day) IUD 1 Device by Intrauterine route      LORazepam (ATIVAN) 1 MG tablet Take 1 tablet (1 mg) by mouth once as needed for anxiety (prior to breast MRI). Take 30 minutes prior to departure.  Do not operate a vehicle after taking this medication 1 tablet 0    nortriptyline (PAMELOR) 50 MG capsule       prochlorperazine (COMPAZINE) 25 MG suppository   0    promethazine (PHENERGAN) 12.5 MG tablet   2    rosuvastatin (CRESTOR) 10 MG tablet Take 10 mg by mouth daily      SUMAtriptan (IMITREX) 20 MG/ACT nasal spray   2    atorvastatin (LIPITOR) 10 MG tablet Take 10 mg by mouth      Cetirizine HCl (ZYRTEC ALLERGY PO) Take 10 mg by mouth 2 times daily      dicyclomine (BENTYL) 10 MG capsule TK 1-2 CS PO QID PRF ABDOMINAL PAIN (Patient not taking: Reported on 5/6/2022)  2    Esomeprazole Magnesium (NEXIUM PO)  (Patient not taking: Reported on 6/12/2025)      gabapentin (NEURONTIN) 300 MG capsule Take 600 mg by mouth      hydrOXYzine (VISTARIL) 25 MG capsule   0     hyoscyamine (LEVSIN/SL) 0.125 MG sublingual tablet TK 1 T PO SUBLINGUAL ROUTE QID (Patient not taking: Reported on 2022)  3    LINZESS 145 MCG capsule TK 1 C PO D ON AN EMPTY STOMACH AT LEAST 30 BEFORE 1ST MEAL OF THE DAY (Patient not taking: Reported on 2022)  3    LORazepam (ATIVAN) 1 MG tablet Take 1 tablet (1 mg) by mouth every 8 hours as needed for anxiety (prior to breast MRI) Take 30 minutes prior to departure.  Do not operate a vehicle after taking this medication (Patient not taking: Reported on 2022) 1 tablet 0    metoclopramide (REGLAN) 5 MG tablet Take 1 tablet (5 mg) by mouth 3 times daily as needed (Patient not taking: Reported on 2022) 30 tablet 0    NORTRIPTYLINE HCL PO Take 40 mg by mouth          Family History:  Family History   Problem Relation Age of Onset    Breast Cancer Mother 52         at 52    Thyroid Disease Mother     Psychotic Disorder Mother     Obesity Mother     Diabetes Father     Obesity Father     Psychotic Disorder Father     Hearing Loss Father     Colon Cancer Father 65        cause of death    Psychotic Disorder Sister     Lung Cancer Maternal Grandmother          at 70    Cerebrovascular Disease Maternal Grandfather     Diabetes Maternal Grandfather     Breast Cancer Paternal Grandmother 40        recurrence in 70s/recurrence in 70s,  at 70    Breast Cancer Maternal Aunt 40         in 70s    Cancer Maternal Aunt 51        brain or bone cancer    Cancer Paternal Aunt         unknown,  in 70s after reaction to chemo    Colon Polyps Paternal Uncle         more than 10 colon polyps    Other - See Comments Paternal Uncle         3 deaths with CoVid    Colon Cancer Cousin 50        maternal cousin       Social History:  Social History     Socioeconomic History    Marital status:      Spouse name: Sukumar    Number of children: 2    Years of education: Not on file    Highest education level: Not on file   Occupational History     Employer:  [employer]   Tobacco Use    Smoking status: Never     Passive exposure: Past    Smokeless tobacco: Never   Substance and Sexual Activity    Alcohol use: Yes     Alcohol/week: 0.0 standard drinks of alcohol     Comment: very rarely    Drug use: No    Sexual activity: Yes     Partners: Male     Birth control/protection: Condom   Other Topics Concern    Parent/sibling w/ CABG, MI or angioplasty before 65F 55M? No     Service No    Blood Transfusions No    Caffeine Concern No    Occupational Exposure No    Hobby Hazards No    Sleep Concern No    Stress Concern Yes    Weight Concern Yes     Comment: would like to lose weight    Special Diet Yes     Comment: elimination dairy    Back Care Yes    Exercise No    Bike Helmet Not Asked     Comment: NA, don't ride    Seat Belt Yes    Self-Exams No   Social History Narrative    Not on file     Social Drivers of Health     Financial Resource Strain: Low Risk  (7/10/2024)    Received from [employer]    Financial Resource Strain     Difficulty of Paying Living Expenses: 3     Difficulty of Paying Living Expenses: Not on file   Food Insecurity: No Food Insecurity (7/10/2024)    Received from [facility]    Food Insecurity     Do you worry your food will run out before you are able to buy more?: 1   Transportation Needs: No Transportation Needs (7/10/2024)    Received from [facility]    Transportation Needs     Does lack of transportation keep you from medical appointments?: 1     Does lack of transportation keep you from work, meetings or getting things that you need?: 1   Physical Activity: Not on file   Stress: Not on file   Social Connections: Socially Integrated (7/10/2024)    Received from [employer]    Social Connections     Do you often feel lonely or isolated from those around you?: 0   Interpersonal Safety: Not At Risk  (5/30/2023)    Humiliation, Afraid, Rape, and Kick questionnaire     Fear of Current or Ex-Partner: No     Emotionally Abused: No     Physically Abused: No     Sexually Abused: No   Housing Stability: Low Risk  (7/10/2024)    Received from [facility]    Housing Stability     What is your housing situation today?: 1       Physical Exam:  /74   Pulse 80   Temp 97.9  F (36.6  C) (Oral)   Resp 16   Wt 88 kg (194 lb)   SpO2 98%   BMI 28.65 kg/m    GENERAL APPEARANCE: healthy, alert and no apparent distress  BREAST: A multipositional, bilateral breast exam was performed.  Fairly symmetrical. Nipples everted bilaterally. Right breast: no palpable dominant masses, no nipple discharge, no skin changes. Dense tissue.  Right axilla: no palpable adenopathy. Left breast: no palpable dominant masses, no nipple discharge, no skin changes. Left axilla: no palpable adenopathy. Dense tissue.    LYMPHATICS: No cervical, supraclavicular, or axillary lymphadenopathy  SKIN: no suspicious lesions or rashes on examined skin    ASSESSMENT    Maritza reports that she is doing well at this visit. She has no concerns with her breast tissue, and no updates to her family's medical history. We discussed the use of tamoxifen for chemoprevention. She qualified for chemoprevention, according to NCCN guidelines. We discussed the 50% reduction in breast cancer rates with taking tamoxifen for five years. We discussed the risks (small risk of blood clots, small risk of endometrial cancer, common side effects of menopause symptoms). Maritza is not interested in starting tamoxifen today. She may be in the future.     We discussed the screening plan below. Maritza has a mammogram after this visit. She will be due for a breast MRI in November. I would like to see her back in June, 2026 with a mammogram following our visit, sooner with concerns. She wonders about updating her genetic testing, which was done in 2015.  Referral placed for her to meet with a genetic counselor to discuss.    We discussed concerns and symptoms to be watchful for between visits, including breast lumps, bumps, nipple inversion, nipple discharge and any changes in skin including crinkled or puckered skin. We discussed the recommendation for breast self awareness.       INDIVIDUALIZED SCREENING PLAN:      Individualized Surveillance Plan for women  With 20% or greater lifetime risk of breast cancer   Per NCCN Breast Cancer Risk Reduction Guidelines Version 2.2024   Recommended screening Test or procedure Last done Next Scheduled    Clinical encounter Breast self awareness.  Clinical breast exam every 6-12 months.   Refer to genetic counseling if not already done.  Consider risk reduction strategies.   June 2025 June 2026   Increased risk of breast cancer based on models that are largely dependant on family history (CALIN)    Women who have a lifetime risk of >20% based on history of LCIS or ADH/ALH    *May begin at age 40 if breast cancers in the family occur at later ages.    Annual mammogram beginning 10 years younger than the earliest breast cancer in the family, or at age of LCIS or ADH/ALH but not prior to age 30.    Recommend annual breast MRI to begin 10 years younger than the earliest breast cancer in the family or at age of diagnosis of LCIS or ADH/ALH but not prior to age 25.    Breast MRIs are preferably done on day 7-15 of the menstrual cycle in premenopausal women.   5/30/2024: Screening tomosynthesis mammogram, BiRads0     6/5/2024: Diagnostic left breast mammogram and US, BiRads1    11/18/2024: Breast MRI, BiRads1   Mammogram today    Breast MRI in November    Return to clinic in June, 2026 with a mammogram following our visit     Breast screening for patients at high risk due to thoracic radiation between the ages of 10-30   Annual clinical exam beginning 8 years after radiation therapy.    Annual screening mammogram beginning 8 years  after radiation therapy, not prior to age 25    Annual breast MRI, beginning 8 years after radiation therapy, not prior to age 25     NA   NA   Recommend risk reducing strategies for women with 1.7% 5 year risk of breast cancer by the Naty model, or 5% 10 year risk of developing breast cancer by the CALIN model. Discussed, politely declined at this visit       I spent a total of 36 minutes on the day of the visit. Please see the note for further information on patient assessment and treatment.     Radha Flores DNP, APRN, [facility]  Clinical Nurse Specialist  Cancer Risk Management Program  [facility]    Cc:  Ollie Loera PA-C

## 2025-06-12 NOTE — LETTER
2025      Maritza Hanson  [id number] [facility] [address]      Dear Colleague,    Thank you for referring your patient, Maritza Hanson, to the [facility]. Please see a copy of my visit note below.    Oncology Risk Management Consultation:  Date on this visit: 2025    Maritza Hanson requires high risk screening and surveillance for her risk of cancer secondary to having a family history of breast cancer in her mother at age 52,  maternal aunt in her 40s and paternal grandmother in her 40s. She has a 28.4% lifetime risk for breast cancer by the CALIN model. She has a 2.4% risk for breast cancer within the next 5 years by the Naty model.      Primary Physician: Ollie Loera PA-C     History Of Present Illness:  Ms. Hasnon is a very pleasant, healthy 50 year old female who presents with a family history of breast cancer.      Genetic Testin2015 - negative for genetic mutations in 25 genes using a My Risk panel through [employer]. Maritza was found to be negative for genetic mutations in: APC, MATTHEW, BARD1, BMPR1A, BRCA1, BRCA2, BRIP1, CDH1, CDK4, CDKN2A, CHEK2, EPCAM (large rearrangement only), MLH1, MSH2, MSH6, MUTYH, NBN, PALB2, PMS2, PTEN, RAD51C, RAD51D, SMAD4, STK11, and TP53 genes. No mutations were found in any of the 25 genes analyzed. This test involved sequencing and deletion/duplication analysis of all genes with the exception of EPCAM (deletions only).     Pertinent history:  Menarche at age 14.  First child at age 34.   Premenopausal.  Ovaries and uterus intact. Hx of tubal ligation.  Density: scattered fibroglandular densities  History of breastfeeding both of her sons, for 6 months and 10 months, respectively.   History of OCPs 10 years and used one dose of Depo-Provera; she was unable to tolerate the medication, as it heightened her anxiety.  No history of HRT  2018- History of MRI guided R core biopsy, normal breast tissue  on pathology.  No history of hyperplasia, atypia or malignancy.     Breast imaging history:  Breast MRI and mammogram in 2007 from [place]. No records available; she states they were normal.  5/29/2015 -Screening mammogram, BiRads 0 for asymmetry in R breast  6/25/2015 - Diagnostic mammogram, R breast - BiRads1, fibroglandular asymmetry in upper R breast  5/31/2016 - Breast MRI, BiRads1  11/7/2016 - Screening tomosynthesis mammogram, BiRads1.  12/4/2017 - Breast MRI, BiRads1  5/16/2018-Screening mammogram, BiRads1.  12/7/2018 - Breast MRI, BiRads0 for Nonmasslike area of  enhancement in the deep right breast at about 12:00, 10 cm from the nipple. The area measures 2.9 x 2.1 x 2.0 cm.   12/13/2018 - Diagnostic tomosynthesis mammogram and right US, both BiRads4 suspicious.  12/28/2018-  MRI guided R core biopsy, normal breast tissue on pathology.  5/31/2019 - Breast MRI, BiRads2.  12/18/2019- Screening tomosynthesis mammogram, BiRads1  11/11/2020- Breast MRI, BiRads3 for Probable benign background parenchymal enhancement at the 12:00 position of the left breast. Further evaluation with targeted ultrasound is recommended. If the ultrasound shows only normal tissue, routine high risk screening would be recommended.  11/17/2020- Left breast US follow up, BiRads2  05/21/2021- Screening tomosynthesis mammogram, BiRads1  11/15/2021- Breast MRI, BiRads1  5/6/2022-Screening tomosynthesis mammogram, BiRads1  11/16/2022- Breast MRI, BiRads1  5/30/2023: Screening tomosynthesis mammogram, BiRads1  11/17/2023: Breast MRI, BiRads1  5/30/2024: Screening tomosynthesis mammogram, BiRads0 for possible architectural distortion, left breast  6/5/2024: Diagnostic left breast mammogram and US, BiRads1  11/18/2024: Breast MRI, BiRads1     At this visit, she denies new fatigue, breast pain, asymmetry, lumps, masses, thickening, pain, nipple discharge and skin changes in her breasts.    Past Medical/Surgical History:  Past Medical History:    Diagnosis Date     Anemia during pregnancy and now     Asthma      Back pain      Depression with anxiety since teenage     History of tics      Mild preeclampsia     during both pregnancy, resolved     POTS (postural orthostatic tachycardia syndrome)      Past Surgical History:   Procedure Laterality Date     CHOLECYSTECTOMY, LAPOROSCOPIC  12/2017     removal of sesimoid bone right foot   1994     wisdom teeth extraction  1998       Allergies:  Allergies as of 06/12/2025 - Reviewed 06/12/2025   Allergen Reaction Noted     Dog epithelium (canis lupus familiaris)  12/13/2019     Dogs  12/13/2019     Dust mites Itching 05/21/2021     Grass  12/13/2019     Hydrocodone Itching 11/13/2012     Hydrocodone, benzhydrocodone, and hydromorphone Itching 09/19/2022     Misc. sulfonamide containing compounds  05/30/2023     Mold  12/13/2019     Ondansetron Itching 05/16/2018     Sulfa antibiotics Hives 11/13/2012     Trees  12/13/2019     Adhesive tape Rash 02/03/2021     Cats Itching and Rash 05/21/2021       Current Medications:  Current Outpatient Medications   Medication Sig Dispense Refill     EPINEPHrine (EPIPEN/ADRENACLICK/OR ANY BX GENERIC EQUIV) 0.3 MG/0.3ML injection 2-pack 1 mg  0     levonorgestrel (MIRENA) 52 MG (20 mcg/day) IUD 1 Device by Intrauterine route       LORazepam (ATIVAN) 1 MG tablet Take 1 tablet (1 mg) by mouth once as needed for anxiety (prior to breast MRI). Take 30 minutes prior to departure.  Do not operate a vehicle after taking this medication 1 tablet 0     nortriptyline (PAMELOR) 50 MG capsule        prochlorperazine (COMPAZINE) 25 MG suppository   0     promethazine (PHENERGAN) 12.5 MG tablet   2     rosuvastatin (CRESTOR) 10 MG tablet Take 10 mg by mouth daily       SUMAtriptan (IMITREX) 20 MG/ACT nasal spray   2     atorvastatin (LIPITOR) 10 MG tablet Take 10 mg by mouth       Cetirizine HCl (ZYRTEC ALLERGY PO) Take 10 mg by mouth 2 times daily       dicyclomine (BENTYL) 10 MG capsule TK  1-2 CS PO QID PRF ABDOMINAL PAIN (Patient not taking: Reported on 2022)  2     Esomeprazole Magnesium (NEXIUM PO)  (Patient not taking: Reported on 2025)       gabapentin (NEURONTIN) 300 MG capsule Take 600 mg by mouth       hydrOXYzine (VISTARIL) 25 MG capsule   0     hyoscyamine (LEVSIN/SL) 0.125 MG sublingual tablet TK 1 T PO SUBLINGUAL ROUTE QID (Patient not taking: Reported on 2022)  3     LINZESS 145 MCG capsule TK 1 C PO D ON AN EMPTY STOMACH AT LEAST 30 BEFORE 1ST MEAL OF THE DAY (Patient not taking: Reported on 2022)  3     LORazepam (ATIVAN) 1 MG tablet Take 1 tablet (1 mg) by mouth every 8 hours as needed for anxiety (prior to breast MRI) Take 30 minutes prior to departure.  Do not operate a vehicle after taking this medication (Patient not taking: Reported on 2022) 1 tablet 0     metoclopramide (REGLAN) 5 MG tablet Take 1 tablet (5 mg) by mouth 3 times daily as needed (Patient not taking: Reported on 2022) 30 tablet 0     NORTRIPTYLINE HCL PO Take 40 mg by mouth          Family History:  Family History   Problem Relation Age of Onset     Breast Cancer Mother 52         at 52     Thyroid Disease Mother      Psychotic Disorder Mother      Obesity Mother      Diabetes Father      Obesity Father      Psychotic Disorder Father      Hearing Loss Father      Colon Cancer Father 65        cause of death     Psychotic Disorder Sister      Lung Cancer Maternal Grandmother          at 70     Cerebrovascular Disease Maternal Grandfather      Diabetes Maternal Grandfather      Breast Cancer Paternal Grandmother 40        recurrence in 70s/recurrence in 70s,  at 70     Breast Cancer Maternal Aunt 40         in 70s     Cancer Maternal Aunt 51        brain or bone cancer     Cancer Paternal Aunt         unknown,  in 70s after reaction to chemo     Colon Polyps Paternal Uncle         more than 10 colon polyps     Other - See Comments Paternal Uncle         3 deaths with CoVid      Colon Cancer Cousin 50        maternal cousin       Social History:  Social History     Socioeconomic History     Marital status:      Spouse name: Sukumar     Number of children: 2     Years of education: Not on file     Highest education level: Not on file   Occupational History     Employer: [employer]   Tobacco Use     Smoking status: Never     Passive exposure: Past     Smokeless tobacco: Never   Substance and Sexual Activity     Alcohol use: Yes     Alcohol/week: 0.0 standard drinks of alcohol     Comment: very rarely     Drug use: No     Sexual activity: Yes     Partners: Male     Birth control/protection: Condom   Other Topics Concern     Parent/sibling w/ CABG, MI or angioplasty before 65F 55M? No      Service No     Blood Transfusions No     Caffeine Concern No     Occupational Exposure No     Hobby Hazards No     Sleep Concern No     Stress Concern Yes     Weight Concern Yes     Comment: would like to lose weight     Special Diet Yes     Comment: elimination dairy     Back Care Yes     Exercise No     Bike Helmet Not Asked     Comment: NA, don't ride     Seat Belt Yes     Self-Exams No   Social History Narrative     Not on file     Social Drivers of Health     Financial Resource Strain: Low Risk  (7/10/2024)    [facility]    Financial Resource Strain      Difficulty of Paying Living Expenses: 3      Difficulty of Paying Living Expenses: Not on file   Food Insecurity: No Food Insecurity (7/10/2024)    [facility]    Food Insecurity      Do you worry your food will run out before you are able to buy more?: 1   Transportation Needs: No Transportation Needs (7/10/2024)    Received from [facility]    Transportation Needs      Does lack of transportation keep you from medical appointments?: 1      Does lack of transportation keep you from work, meetings or  getting things that you need?: 1   Physical Activity: Not on file   Stress: Not on file   Social Connections: Socially Integrated (7/10/2024)    Received from [facility]    Social Connections      Do you often feel lonely or isolated from those around you?: 0   Interpersonal Safety: Not At Risk (5/30/2023)    Humiliation, Afraid, Rape, and Kick questionnaire      Fear of Current or Ex-Partner: No      Emotionally Abused: No      Physically Abused: No      Sexually Abused: No   Housing Stability: Low Risk  (7/10/2024)    Received from [facility]    Housing Stability      What is your housing situation today?: 1       Physical Exam:  /74   Pulse 80   Temp 97.9  F (36.6  C) (Oral)   Resp 16   Wt 88 kg (194 lb)   SpO2 98%   BMI 28.65 kg/m    GENERAL APPEARANCE: healthy, alert and no apparent distress  BREAST: A multipositional, bilateral breast exam was performed.  Fairly symmetrical. Nipples everted bilaterally. Right breast: no palpable dominant masses, no nipple discharge, no skin changes. Dense tissue.  Right axilla: no palpable adenopathy. Left breast: no palpable dominant masses, no nipple discharge, no skin changes. Left axilla: no palpable adenopathy. Dense tissue.    LYMPHATICS: No cervical, supraclavicular, or axillary lymphadenopathy  SKIN: no suspicious lesions or rashes on examined skin    ASSESSMENT    Maritza reports that she is doing well at this visit. She has no concerns with her breast tissue, and no updates to her family's medical history. We discussed the use of tamoxifen for chemoprevention. She qualified for chemoprevention, according to NCCN guidelines. We discussed the 50% reduction in breast cancer rates with taking tamoxifen for five years. We discussed the risks (small risk of blood clots, small risk of endometrial cancer, common side effects of menopause symptoms). Maritza is not interested in starting tamoxifen  today. She may be in the future.     We discussed the screening plan below. Maritza has a mammogram after this visit. She will be due for a breast MRI in November. I would like to see her back in June, 2026 with a mammogram following our visit, sooner with concerns. She wonders about updating her genetic testing, which was done in 2015. Referral placed for her to meet with a genetic counselor to discuss.    We discussed concerns and symptoms to be watchful for between visits, including breast lumps, bumps, nipple inversion, nipple discharge and any changes in skin including crinkled or puckered skin. We discussed the recommendation for breast self awareness.       INDIVIDUALIZED SCREENING PLAN:      Individualized Surveillance Plan for women  With 20% or greater lifetime risk of breast cancer   Per NCCN Breast Cancer Risk Reduction Guidelines Version 2.2024   Recommended screening Test or procedure Last done Next Scheduled    Clinical encounter Breast self awareness.  Clinical breast exam every 6-12 months.   Refer to genetic counseling if not already done.  Consider risk reduction strategies.   June 2025 June 2026   Increased risk of breast cancer based on models that are largely dependant on family history (CALIN)    Women who have a lifetime risk of >20% based on history of LCIS or ADH/ALH    *May begin at age 40 if breast cancers in the family occur at later ages.    Annual mammogram beginning 10 years younger than the earliest breast cancer in the family, or at age of LCIS or ADH/ALH but not prior to age 30.    Recommend annual breast MRI to begin 10 years younger than the earliest breast cancer in the family or at age of diagnosis of LCIS or ADH/ALH but not prior to age 25.    Breast MRIs are preferably done on day 7-15 of the menstrual cycle in premenopausal women.   5/30/2024: Screening tomosynthesis mammogram, BiRads0     6/5/2024: Diagnostic left breast mammogram and US, BiRads1    11/18/2024: Breast MRI,  BiRads1   Mammogram today    Breast MRI in November    Return to clinic in June, 2026 with a mammogram following our visit     Breast screening for patients at high risk due to thoracic radiation between the ages of 10-30   Annual clinical exam beginning 8 years after radiation therapy.    Annual screening mammogram beginning 8 years after radiation therapy, not prior to age 25    Annual breast MRI, beginning 8 years after radiation therapy, not prior to age 25     NA   NA   Recommend risk reducing strategies for women with 1.7% 5 year risk of breast cancer by the Naty model, or 5% 10 year risk of developing breast cancer by the CALIN model. Discussed, politely declined at this visit       I spent a total of 36 minutes on the day of the visit. Please see the note for further information on patient assessment and treatment.     Radha Flores DNP, JENNIFER, [facility]  Clinical Nurse Specialist  Cancer Risk Management Program  [facility]    Cc:  Ollie Loera PA-C      Again, thank you for allowing me to participate in the care of your patient.        Sincerely,        JENNIFER Valle CNP    Electronically signed

## 2025-06-12 NOTE — NURSING NOTE
"Oncology Rooming Note    June 12, 2025 12:12 PM   Maritza Hanson is a 50 year old adult who presents for:    Chief Complaint   Patient presents with    Oncology Clinic Visit     At high risk for breast cancer     Initial Vitals: /74   Pulse 80   Temp 97.9  F (36.6  C) (Oral)   Resp 16   Wt 88 kg (194 lb)   SpO2 98%   BMI 28.65 kg/m   Estimated body mass index is 28.65 kg/m  as calculated from the following:    Height as of 1/15/25: 1.753 m (5' 9\").    Weight as of this encounter: 88 kg (194 lb). Body surface area is 2.07 meters squared.  No Pain (0) Comment: Data Unavailable   No LMP recorded. (Menstrual status: IUD).  Allergies reviewed: Yes  Medications reviewed: Yes    Medications: Medication refills not needed today.  Pharmacy [facility] entered into [employer]: [id number] - [place], [place] - [address] AT [address] [address] & [facility]    Frailty Screening:   Is the patient here for a new oncology consult visit in cancer care? 2. No    PHQ9:  Did this patient require a PHQ9?: No      Clinical concerns:        Christi Gamble              "

## 2025-08-10 ENCOUNTER — APPOINTMENT (OUTPATIENT)
Dept: CT IMAGING | Facility: CLINIC | Age: 50
End: 2025-08-10
Attending: STUDENT IN AN ORGANIZED HEALTH CARE EDUCATION/TRAINING PROGRAM
Payer: COMMERCIAL

## 2025-08-10 ENCOUNTER — HOSPITAL ENCOUNTER (EMERGENCY)
Facility: CLINIC | Age: 50
Discharge: HOME | End: 2025-08-10
Attending: STUDENT IN AN ORGANIZED HEALTH CARE EDUCATION/TRAINING PROGRAM | Admitting: STUDENT IN AN ORGANIZED HEALTH CARE EDUCATION/TRAINING PROGRAM
Payer: COMMERCIAL

## 2025-08-10 VITALS
OXYGEN SATURATION: 99 % | SYSTOLIC BLOOD PRESSURE: 116 MMHG | RESPIRATION RATE: 18 BRPM | TEMPERATURE: 99.2 F | DIASTOLIC BLOOD PRESSURE: 79 MMHG | HEART RATE: 100 BPM

## 2025-08-10 DIAGNOSIS — R07.9 CHEST PAIN, UNSPECIFIED TYPE: Primary | ICD-10-CM

## 2025-08-10 LAB
ANION GAP SERPL CALCULATED.3IONS-SCNC: 13 MMOL/L (ref 7–15)
BUN SERPL-MCNC: 9.7 MG/DL (ref 6–20)
CALCIUM SERPL-MCNC: 9.4 MG/DL (ref 8.8–10.4)
CHLORIDE SERPL-SCNC: 102 MMOL/L (ref 98–107)
CREAT SERPL-MCNC: 0.55 MG/DL (ref 0.51–0.95)
D DIMER PPP FEU-MCNC: 0.99 UG/ML FEU (ref 0–0.5)
EGFRCR SERPLBLD CKD-EPI 2021: >90 ML/MIN/1.73M2
ERYTHROCYTE [DISTWIDTH] IN BLOOD BY AUTOMATED COUNT: 12.4 % (ref 10–15)
GLUCOSE SERPL-MCNC: 111 MG/DL (ref 70–99)
HCO3 SERPL-SCNC: 22 MMOL/L (ref 22–29)
HCT VFR BLD AUTO: 36.5 % (ref 35–47)
HGB BLD-MCNC: 12.8 G/DL (ref 11.7–15.7)
HOLD SPECIMEN: NORMAL
MCH RBC QN AUTO: 30.7 PG (ref 26.5–33)
MCHC RBC AUTO-ENTMCNC: 35.1 G/DL (ref 31.5–36.5)
MCV RBC AUTO: 88 FL (ref 78–100)
PLATELET # BLD AUTO: 217 10E3/UL (ref 150–450)
POTASSIUM SERPL-SCNC: 4 MMOL/L (ref 3.4–5.3)
RBC # BLD AUTO: 4.17 10E6/UL (ref 3.8–5.2)
SODIUM SERPL-SCNC: 137 MMOL/L (ref 135–145)
TROPONIN T SERPL HS-MCNC: <6 NG/L
WBC # BLD AUTO: 10.3 10E3/UL (ref 4–11)

## 2025-08-10 PROCEDURE — 80048 BASIC METABOLIC PNL TOTAL CA: CPT | Performed by: EMERGENCY MEDICINE

## 2025-08-10 PROCEDURE — 250N000009 HC RX 250: Performed by: STUDENT IN AN ORGANIZED HEALTH CARE EDUCATION/TRAINING PROGRAM

## 2025-08-10 PROCEDURE — 85379 FIBRIN DEGRADATION QUANT: CPT | Performed by: STUDENT IN AN ORGANIZED HEALTH CARE EDUCATION/TRAINING PROGRAM

## 2025-08-10 PROCEDURE — 96374 THER/PROPH/DIAG INJ IV PUSH: CPT | Mod: 59

## 2025-08-10 PROCEDURE — 250N000011 HC RX IP 250 OP 636: Performed by: STUDENT IN AN ORGANIZED HEALTH CARE EDUCATION/TRAINING PROGRAM

## 2025-08-10 PROCEDURE — 36415 COLL VENOUS BLD VENIPUNCTURE: CPT | Performed by: STUDENT IN AN ORGANIZED HEALTH CARE EDUCATION/TRAINING PROGRAM

## 2025-08-10 PROCEDURE — 96360 HYDRATION IV INFUSION INIT: CPT

## 2025-08-10 PROCEDURE — 99291 CRITICAL CARE FIRST HOUR: CPT | Mod: 25 | Performed by: STUDENT IN AN ORGANIZED HEALTH CARE EDUCATION/TRAINING PROGRAM

## 2025-08-10 PROCEDURE — 84484 ASSAY OF TROPONIN QUANT: CPT | Performed by: EMERGENCY MEDICINE

## 2025-08-10 PROCEDURE — 250N000013 HC RX MED GY IP 250 OP 250 PS 637: Performed by: STUDENT IN AN ORGANIZED HEALTH CARE EDUCATION/TRAINING PROGRAM

## 2025-08-10 PROCEDURE — 71275 CT ANGIOGRAPHY CHEST: CPT

## 2025-08-10 PROCEDURE — 99285 EMERGENCY DEPT VISIT HI MDM: CPT | Mod: 25

## 2025-08-10 PROCEDURE — 258N000003 HC RX IP 258 OP 636: Performed by: STUDENT IN AN ORGANIZED HEALTH CARE EDUCATION/TRAINING PROGRAM

## 2025-08-10 PROCEDURE — 85027 COMPLETE CBC AUTOMATED: CPT | Performed by: EMERGENCY MEDICINE

## 2025-08-10 RX ORDER — MAGNESIUM HYDROXIDE/ALUMINUM HYDROXICE/SIMETHICONE 120; 1200; 1200 MG/30ML; MG/30ML; MG/30ML
15 SUSPENSION ORAL ONCE
Status: COMPLETED | OUTPATIENT
Start: 2025-08-10 | End: 2025-08-10

## 2025-08-10 RX ORDER — FAMOTIDINE 20 MG/1
40 TABLET, FILM COATED ORAL ONCE
Status: COMPLETED | OUTPATIENT
Start: 2025-08-10 | End: 2025-08-10

## 2025-08-10 RX ORDER — IOPAMIDOL 755 MG/ML
500 INJECTION, SOLUTION INTRAVASCULAR ONCE
Status: COMPLETED | OUTPATIENT
Start: 2025-08-10 | End: 2025-08-10

## 2025-08-10 RX ORDER — LIDOCAINE HYDROCHLORIDE 20 MG/ML
5 SOLUTION OROPHARYNGEAL ONCE
Status: COMPLETED | OUTPATIENT
Start: 2025-08-10 | End: 2025-08-10

## 2025-08-10 RX ORDER — KETOROLAC TROMETHAMINE 15 MG/ML
15 INJECTION, SOLUTION INTRAMUSCULAR; INTRAVENOUS ONCE
Status: COMPLETED | OUTPATIENT
Start: 2025-08-10 | End: 2025-08-10

## 2025-08-10 RX ADMIN — LIDOCAINE HYDROCHLORIDE 5 ML: 20 SOLUTION ORAL at 18:42

## 2025-08-10 RX ADMIN — SODIUM CHLORIDE 1000 ML: 0.9 INJECTION, SOLUTION INTRAVENOUS at 18:52

## 2025-08-10 RX ADMIN — ALUMINUM HYDROXIDE, MAGNESIUM HYDROXIDE, AND SIMETHICONE 15 ML: 200; 200; 20 SUSPENSION ORAL at 18:41

## 2025-08-10 RX ADMIN — KETOROLAC TROMETHAMINE 15 MG: 15 INJECTION, SOLUTION INTRAMUSCULAR; INTRAVENOUS at 19:43

## 2025-08-10 RX ADMIN — SODIUM CHLORIDE 85 ML: 9 INJECTION, SOLUTION INTRAVENOUS at 19:53

## 2025-08-10 RX ADMIN — FAMOTIDINE 40 MG: 20 TABLET, FILM COATED ORAL at 18:41

## 2025-08-10 RX ADMIN — IOPAMIDOL 65 ML: 755 INJECTION, SOLUTION INTRAVENOUS at 19:53

## 2025-08-10 ASSESSMENT — COLUMBIA-SUICIDE SEVERITY RATING SCALE - C-SSRS
1. IN THE PAST MONTH, HAVE YOU WISHED YOU WERE DEAD OR WISHED YOU COULD GO TO SLEEP AND NOT WAKE UP?: NO
6. HAVE YOU EVER DONE ANYTHING, STARTED TO DO ANYTHING, OR PREPARED TO DO ANYTHING TO END YOUR LIFE?: NO
2. HAVE YOU ACTUALLY HAD ANY THOUGHTS OF KILLING YOURSELF IN THE PAST MONTH?: NO

## 2025-08-10 ASSESSMENT — ACTIVITIES OF DAILY LIVING (ADL)
ADLS_ACUITY_SCORE: 41

## 2025-08-11 LAB
ATRIAL RATE - MUSE: 115 BPM
DIASTOLIC BLOOD PRESSURE - MUSE: NORMAL MMHG
INTERPRETATION ECG - MUSE: NORMAL
P AXIS - MUSE: 57 DEGREES
PR INTERVAL - MUSE: 154 MS
QRS DURATION - MUSE: 82 MS
QT - MUSE: 324 MS
QTC - MUSE: 448 MS
R AXIS - MUSE: 28 DEGREES
SYSTOLIC BLOOD PRESSURE - MUSE: NORMAL MMHG
T AXIS - MUSE: 46 DEGREES
VENTRICULAR RATE- MUSE: 115 BPM

## (undated) RX ORDER — ALBUTEROL SULFATE 0.83 MG/ML
SOLUTION RESPIRATORY (INHALATION)
Status: DISPENSED
Start: 2017-12-08